# Patient Record
Sex: FEMALE | Race: WHITE | NOT HISPANIC OR LATINO | URBAN - METROPOLITAN AREA
[De-identification: names, ages, dates, MRNs, and addresses within clinical notes are randomized per-mention and may not be internally consistent; named-entity substitution may affect disease eponyms.]

---

## 2017-03-30 ENCOUNTER — OUTPATIENT (OUTPATIENT)
Dept: OUTPATIENT SERVICES | Facility: HOSPITAL | Age: 57
LOS: 1 days | Discharge: HOME | End: 2017-03-30
Payer: COMMERCIAL

## 2017-03-30 VITALS
RESPIRATION RATE: 18 BRPM | WEIGHT: 248.9 LBS | HEART RATE: 65 BPM | SYSTOLIC BLOOD PRESSURE: 144 MMHG | TEMPERATURE: 98 F | HEIGHT: 62.5 IN | DIASTOLIC BLOOD PRESSURE: 85 MMHG | OXYGEN SATURATION: 100 %

## 2017-03-30 DIAGNOSIS — Z90.49 ACQUIRED ABSENCE OF OTHER SPECIFIED PARTS OF DIGESTIVE TRACT: Chronic | ICD-10-CM

## 2017-03-30 DIAGNOSIS — Z98.890 OTHER SPECIFIED POSTPROCEDURAL STATES: Chronic | ICD-10-CM

## 2017-03-30 DIAGNOSIS — K43.2 INCISIONAL HERNIA WITHOUT OBSTRUCTION OR GANGRENE: ICD-10-CM

## 2017-03-30 LAB
ABO RH CONFIRMATION: SIGNIFICANT CHANGE UP
ANION GAP SERPL CALC-SCNC: 5 MMOL/L — SIGNIFICANT CHANGE UP (ref 5–17)
APPEARANCE UR: CLEAR — SIGNIFICANT CHANGE UP
APTT BLD: 31.3 SEC — SIGNIFICANT CHANGE UP (ref 27.5–37.4)
BASOPHILS # BLD AUTO: 0.1 K/UL — SIGNIFICANT CHANGE UP (ref 0–0.2)
BASOPHILS NFR BLD AUTO: 0.7 % — SIGNIFICANT CHANGE UP (ref 0–2)
BILIRUB UR-MCNC: NEGATIVE — SIGNIFICANT CHANGE UP
BLD GP AB SCN SERPL QL: SIGNIFICANT CHANGE UP
BUN SERPL-MCNC: 15 MG/DL — SIGNIFICANT CHANGE UP (ref 7–23)
CALCIUM SERPL-MCNC: 8.8 MG/DL — SIGNIFICANT CHANGE UP (ref 8.5–10.1)
CHLORIDE SERPL-SCNC: 111 MMOL/L — HIGH (ref 96–108)
CO2 SERPL-SCNC: 27 MMOL/L — SIGNIFICANT CHANGE UP (ref 22–31)
COLOR SPEC: YELLOW — SIGNIFICANT CHANGE UP
CREAT SERPL-MCNC: 0.92 MG/DL — SIGNIFICANT CHANGE UP (ref 0.5–1.3)
DIFF PNL FLD: NEGATIVE — SIGNIFICANT CHANGE UP
EOSINOPHIL # BLD AUTO: 0.1 K/UL — SIGNIFICANT CHANGE UP (ref 0–0.5)
EOSINOPHIL NFR BLD AUTO: 1.6 % — SIGNIFICANT CHANGE UP (ref 0–6)
GLUCOSE SERPL-MCNC: 84 MG/DL — SIGNIFICANT CHANGE UP (ref 70–99)
GLUCOSE UR QL: NEGATIVE MG/DL — SIGNIFICANT CHANGE UP
HCT VFR BLD CALC: 45.2 % — HIGH (ref 34.5–45)
HGB BLD-MCNC: 15.1 G/DL — SIGNIFICANT CHANGE UP (ref 11.5–15.5)
INR BLD: 0.99 RATIO — SIGNIFICANT CHANGE UP (ref 0.88–1.16)
KETONES UR-MCNC: NEGATIVE — SIGNIFICANT CHANGE UP
LEUKOCYTE ESTERASE UR-ACNC: NEGATIVE — SIGNIFICANT CHANGE UP
LYMPHOCYTES # BLD AUTO: 2.5 K/UL — SIGNIFICANT CHANGE UP (ref 1–3.3)
LYMPHOCYTES # BLD AUTO: 28.5 % — SIGNIFICANT CHANGE UP (ref 13–44)
MCHC RBC-ENTMCNC: 30.2 PG — SIGNIFICANT CHANGE UP (ref 27–34)
MCHC RBC-ENTMCNC: 33.4 GM/DL — SIGNIFICANT CHANGE UP (ref 32–36)
MCV RBC AUTO: 90.2 FL — SIGNIFICANT CHANGE UP (ref 80–100)
MONOCYTES # BLD AUTO: 0.7 K/UL — SIGNIFICANT CHANGE UP (ref 0–0.9)
MONOCYTES NFR BLD AUTO: 7.8 % — SIGNIFICANT CHANGE UP (ref 2–14)
NEUTROPHILS # BLD AUTO: 5.5 K/UL — SIGNIFICANT CHANGE UP (ref 1.8–7.4)
NEUTROPHILS NFR BLD AUTO: 61.4 % — SIGNIFICANT CHANGE UP (ref 43–77)
NITRITE UR-MCNC: NEGATIVE — SIGNIFICANT CHANGE UP
PH UR: 6 — SIGNIFICANT CHANGE UP (ref 4.8–8)
PLATELET # BLD AUTO: 295 K/UL — SIGNIFICANT CHANGE UP (ref 150–400)
POTASSIUM SERPL-MCNC: 4.2 MMOL/L — SIGNIFICANT CHANGE UP (ref 3.5–5.3)
POTASSIUM SERPL-SCNC: 4.2 MMOL/L — SIGNIFICANT CHANGE UP (ref 3.5–5.3)
PROT UR-MCNC: NEGATIVE MG/DL — SIGNIFICANT CHANGE UP
PROTHROM AB SERPL-ACNC: 10.7 SEC — SIGNIFICANT CHANGE UP (ref 9.8–12.7)
RBC # BLD: 5.02 M/UL — SIGNIFICANT CHANGE UP (ref 3.8–5.2)
RBC # FLD: 13 % — SIGNIFICANT CHANGE UP (ref 10.3–14.5)
SODIUM SERPL-SCNC: 143 MMOL/L — SIGNIFICANT CHANGE UP (ref 135–145)
SP GR SPEC: 1.01 — SIGNIFICANT CHANGE UP (ref 1.01–1.02)
TYPE + AB SCN PNL BLD: SIGNIFICANT CHANGE UP
UROBILINOGEN FLD QL: NEGATIVE MG/DL — SIGNIFICANT CHANGE UP
WBC # BLD: 8.9 K/UL — SIGNIFICANT CHANGE UP (ref 3.8–10.5)
WBC # FLD AUTO: 8.9 K/UL — SIGNIFICANT CHANGE UP (ref 3.8–10.5)

## 2017-03-30 PROCEDURE — 71020: CPT | Mod: 26

## 2017-03-30 PROCEDURE — 93010 ELECTROCARDIOGRAM REPORT: CPT

## 2017-03-30 NOTE — H&P PST ADULT - PSH
H/O gastric bypass  2004  S/P appendectomy  1971  S/P bunionectomy  Right x 1 - 1999. left x 2- 2001, 2014

## 2017-03-30 NOTE — H&P PST ADULT - HISTORY OF PRESENT ILLNESS
57 years old female with epigastric pain and "lump" 4/20/2017. Patient was taken to ER by ambulance. "Lump went away" after pain medications. Patient was discharged. Went to  GI doctor. Ct. Scan of abdomen ordered and indicated incisional hernia. Referred to Dr. Garza. Planned hernia repair. Denies pain , nausea or vomiting at present.

## 2017-03-30 NOTE — H&P PST ADULT - PMH
Anemia, unspecified type    Anxiety and depression    History of gastric ulcer    History of melanoma  removed from back.  Incisional hernia, without obstruction or gangrene    Obesity, unspecified obesity severity, unspecified obesity type    Sleep apnea, obstructive    Uncomplicated asthma, unspecified asthma severity

## 2017-03-30 NOTE — H&P PST ADULT - ASSESSMENT
57 years old female present to PST prior to  laparoscopic incarcerated incisional hernia repair, Upper GI Endoscopy with Dr. Romero Garza.  Plan   1. NPO after midnight  2. Take the following medications with sips of water on the day of procedure: Montelukast, Bupropion, Pristiq. Use Breo.  3. Use E-Z sponge as directed  4. Drink a quart of extra  fluids the day before your surgery.  7. CBC, BMP, CMP, PT /PTT /INR, Urinalysis, Type and Screen sent to lab  8. EKG and CXR done

## 2017-03-30 NOTE — H&P PST ADULT - FAMILY HISTORY
Father  Still living? Yes, Estimated age: 81-90  Family history of heart disease, Age at diagnosis: Age Unknown  Family history of COPD (chronic obstructive pulmonary disease), Age at diagnosis: Age Unknown  Family history of diabetes mellitus, Age at diagnosis: Age Unknown  Family history of Alzheimer's disease, Age at diagnosis: Age Unknown

## 2017-04-04 RX ORDER — SODIUM CHLORIDE 9 MG/ML
1000 INJECTION INTRAMUSCULAR; INTRAVENOUS; SUBCUTANEOUS
Qty: 0 | Refills: 0 | Status: DISCONTINUED | OUTPATIENT
Start: 2017-04-05 | End: 2017-04-05

## 2017-04-04 RX ORDER — ONDANSETRON 8 MG/1
4 TABLET, FILM COATED ORAL ONCE
Qty: 0 | Refills: 0 | Status: COMPLETED | OUTPATIENT
Start: 2017-04-05 | End: 2017-04-05

## 2017-04-05 ENCOUNTER — OUTPATIENT (OUTPATIENT)
Dept: OUTPATIENT SERVICES | Facility: HOSPITAL | Age: 57
LOS: 1 days | Discharge: HOME | End: 2017-04-05

## 2017-04-05 VITALS
DIASTOLIC BLOOD PRESSURE: 88 MMHG | WEIGHT: 248.9 LBS | SYSTOLIC BLOOD PRESSURE: 158 MMHG | HEIGHT: 62.5 IN | OXYGEN SATURATION: 98 % | TEMPERATURE: 98 F | RESPIRATION RATE: 14 BRPM | HEART RATE: 69 BPM

## 2017-04-05 VITALS
RESPIRATION RATE: 16 BRPM | TEMPERATURE: 98 F | OXYGEN SATURATION: 97 % | SYSTOLIC BLOOD PRESSURE: 149 MMHG | HEART RATE: 74 BPM | DIASTOLIC BLOOD PRESSURE: 70 MMHG

## 2017-04-05 DIAGNOSIS — Z98.890 OTHER SPECIFIED POSTPROCEDURAL STATES: Chronic | ICD-10-CM

## 2017-04-05 DIAGNOSIS — Z87.11 PERSONAL HISTORY OF PEPTIC ULCER DISEASE: ICD-10-CM

## 2017-04-05 DIAGNOSIS — G47.33 OBSTRUCTIVE SLEEP APNEA (ADULT) (PEDIATRIC): ICD-10-CM

## 2017-04-05 DIAGNOSIS — Z98.84 BARIATRIC SURGERY STATUS: ICD-10-CM

## 2017-04-05 DIAGNOSIS — K43.0 INCISIONAL HERNIA WITH OBSTRUCTION, WITHOUT GANGRENE: ICD-10-CM

## 2017-04-05 DIAGNOSIS — E66.9 OBESITY, UNSPECIFIED: ICD-10-CM

## 2017-04-05 DIAGNOSIS — Z90.49 ACQUIRED ABSENCE OF OTHER SPECIFIED PARTS OF DIGESTIVE TRACT: Chronic | ICD-10-CM

## 2017-04-05 DIAGNOSIS — F41.9 ANXIETY DISORDER, UNSPECIFIED: ICD-10-CM

## 2017-04-05 DIAGNOSIS — R13.10 DYSPHAGIA, UNSPECIFIED: ICD-10-CM

## 2017-04-05 DIAGNOSIS — K21.9 GASTRO-ESOPHAGEAL REFLUX DISEASE WITHOUT ESOPHAGITIS: ICD-10-CM

## 2017-04-05 DIAGNOSIS — J45.909 UNSPECIFIED ASTHMA, UNCOMPLICATED: ICD-10-CM

## 2017-04-05 LAB — HCG UR QL: NEGATIVE — SIGNIFICANT CHANGE UP

## 2017-04-05 RX ORDER — FENTANYL CITRATE 50 UG/ML
50 INJECTION INTRAVENOUS
Qty: 0 | Refills: 0 | Status: DISCONTINUED | OUTPATIENT
Start: 2017-04-05 | End: 2017-04-05

## 2017-04-05 RX ORDER — ACETAMINOPHEN 500 MG
1000 TABLET ORAL ONCE
Qty: 0 | Refills: 0 | Status: COMPLETED | OUTPATIENT
Start: 2017-04-05 | End: 2017-04-05

## 2017-04-05 RX ORDER — TIOTROPIUM BROMIDE 18 UG/1
1 CAPSULE ORAL; RESPIRATORY (INHALATION) DAILY
Qty: 0 | Refills: 0 | Status: ACTIVE | OUTPATIENT
Start: 2017-04-05 | End: 2018-03-04

## 2017-04-05 RX ORDER — MORPHINE SULFATE 50 MG/1
4 CAPSULE, EXTENDED RELEASE ORAL EVERY 4 HOURS
Qty: 0 | Refills: 0 | Status: DISCONTINUED | OUTPATIENT
Start: 2017-04-05 | End: 2017-04-05

## 2017-04-05 RX ORDER — HEPARIN SODIUM 5000 [USP'U]/ML
5000 INJECTION INTRAVENOUS; SUBCUTANEOUS ONCE
Qty: 0 | Refills: 0 | Status: COMPLETED | OUTPATIENT
Start: 2017-04-05 | End: 2017-04-05

## 2017-04-05 RX ORDER — SODIUM CHLORIDE 9 MG/ML
1000 INJECTION INTRAMUSCULAR; INTRAVENOUS; SUBCUTANEOUS
Qty: 0 | Refills: 0 | Status: DISCONTINUED | OUTPATIENT
Start: 2017-04-05 | End: 2017-04-05

## 2017-04-05 RX ORDER — ONDANSETRON 8 MG/1
4 TABLET, FILM COATED ORAL ONCE
Qty: 0 | Refills: 0 | Status: DISCONTINUED | OUTPATIENT
Start: 2017-04-05 | End: 2017-04-05

## 2017-04-05 RX ORDER — ALBUTEROL 90 UG/1
1 AEROSOL, METERED ORAL EVERY 4 HOURS
Qty: 0 | Refills: 0 | Status: ACTIVE | OUTPATIENT
Start: 2017-04-05 | End: 2018-03-04

## 2017-04-05 RX ORDER — IPRATROPIUM/ALBUTEROL SULFATE 18-103MCG
3 AEROSOL WITH ADAPTER (GRAM) INHALATION EVERY 6 HOURS
Qty: 0 | Refills: 0 | Status: COMPLETED | OUTPATIENT
Start: 2017-04-05 | End: 2017-04-05

## 2017-04-05 RX ORDER — OXYCODONE HYDROCHLORIDE 5 MG/1
5 TABLET ORAL ONCE
Qty: 0 | Refills: 0 | Status: DISCONTINUED | OUTPATIENT
Start: 2017-04-05 | End: 2017-04-05

## 2017-04-05 RX ADMIN — ONDANSETRON 4 MILLIGRAM(S): 8 TABLET, FILM COATED ORAL at 07:11

## 2017-04-05 RX ADMIN — HEPARIN SODIUM 5000 UNIT(S): 5000 INJECTION INTRAVENOUS; SUBCUTANEOUS at 07:11

## 2017-04-05 RX ADMIN — Medication 3 MILLILITER(S): at 12:55

## 2017-04-05 RX ADMIN — FENTANYL CITRATE 50 MICROGRAM(S): 50 INJECTION INTRAVENOUS at 11:05

## 2017-04-05 RX ADMIN — Medication 400 MILLIGRAM(S): at 11:08

## 2017-04-05 RX ADMIN — OXYCODONE HYDROCHLORIDE 5 MILLIGRAM(S): 5 TABLET ORAL at 11:08

## 2017-04-05 RX ADMIN — SODIUM CHLORIDE 75 MILLILITER(S): 9 INJECTION INTRAMUSCULAR; INTRAVENOUS; SUBCUTANEOUS at 10:46

## 2017-04-05 NOTE — DISCHARGE NOTE ADULT - CARE PLAN
Goal:	pain control  Instructions for follow-up, activity and diet:	no heavy lifting for 4-6 weeks, advance diet as tolerated, walk frequently, ok to shower, no driving 5 days, no creams or ointments on incisions. Principal Discharge DX:	Incisional hernia, without obstruction or gangrene  Goal:	pain control  Instructions for follow-up, activity and diet:	no heavy lifting for 4-6 weeks, advance diet as tolerated, walk frequently, ok to shower, no driving 5 days, no creams or ointments on incisions.

## 2017-04-05 NOTE — DISCHARGE NOTE ADULT - NS AS ACTIVITY OBS
No Heavy lifting/straining/Do not drive or operate machinery/Walking-Indoors allowed/Stairs allowed/Do not make important decisions/Walking-Outdoors allowed/Showering allowed

## 2017-04-05 NOTE — DISCHARGE NOTE ADULT - PLAN OF CARE
pain control no heavy lifting for 4-6 weeks, advance diet as tolerated, walk frequently, ok to shower, no driving 5 days, no creams or ointments on incisions.

## 2017-04-05 NOTE — DISCHARGE NOTE ADULT - PATIENT PORTAL LINK FT
“You can access the [facility] Patient Portal, offered by , by registering with the following website: [email or web address]”

## 2017-04-05 NOTE — DISCHARGE NOTE ADULT - HOSPITAL COURSE
pt admitted through [facility] for outpatient procedure- pt has a CT scan noted for incisional hernia, pt with past gayathri en y gastric bypass. Pt scheduled for elective repair of incisional hernia. oNce stable, pt ok for discharge home today, follow up in office in 2 weeks.

## 2017-04-05 NOTE — BRIEF OPERATIVE NOTE - PRE-OP DX
Incisional hernia of anterior abdominal wall without obstruction or gangrene  04/05/2017    Active  Mine Kim

## 2017-04-05 NOTE — DISCHARGE NOTE ADULT - MEDICATION SUMMARY - MEDICATIONS TO TAKE
I will START or STAY ON the medications listed below when I get home from the hospital:    Pristiq 100 mg oral tablet, extended release  -- 1 tab(s) by mouth once a day (in the morning)  -- Indication: For home medication    Breo Ellipta 200 mcg-25 mcg/inh inhalation powder  -- 1 puff(s) inhaled once a day (in the morning)  -- Indication: For home medication    ferrous sulfate 325 mg (65 mg elemental iron) oral delayed release tablet  -- 1 tab(s) by mouth once a day (at bedtime)  -- Indication: For home medication    montelukast 10 mg oral tablet  -- 1 tab(s) by mouth once a day (in the morning)  -- Indication: For home medication    omeprazole 40 mg oral delayed release capsule  -- 1 cap(s) by mouth once a day (in the morning)  -- Indication: For home medication    buPROPion 24 hour extended release  -- 150 milligram(s) by mouth once a day (in the morning)  -- Indication: For home medication    multivitamin  -- 1 tab(s) by mouth once a day  -- Indication: For home medication    Calcium 600+D oral tablet  -- 1 tab(s) by mouth once a day (in the morning)  -- Indication: For home medication    Vitamin D3 1000 intl units oral capsule  -- 1 cap(s) by mouth once a day (in the morning)  -- Indication: For home medication    Vitamin C 500 mg oral capsule  -- 1 cap(s) by mouth once a day (at bedtime)  -- Indication: For home medication

## 2017-04-05 NOTE — ASU DISCHARGE PLAN (ADULT/PEDIATRIC). - NOTIFY
Persistent Nausea and Vomiting/Swelling that continues/Bleeding that does not stop/Pain not relieved by Medications/Unable to Urinate

## 2017-08-09 ENCOUNTER — OTHER (OUTPATIENT)
Age: 57
End: 2017-08-09

## 2017-08-30 ENCOUNTER — APPOINTMENT (OUTPATIENT)
Dept: OBGYN | Facility: CLINIC | Age: 57
End: 2017-08-30
Payer: COMMERCIAL

## 2017-08-30 VITALS
SYSTOLIC BLOOD PRESSURE: 130 MMHG | HEIGHT: 62 IN | DIASTOLIC BLOOD PRESSURE: 82 MMHG | WEIGHT: 250 LBS | BODY MASS INDEX: 46.01 KG/M2

## 2017-08-30 PROCEDURE — 99396 PREV VISIT EST AGE 40-64: CPT

## 2017-08-31 ENCOUNTER — TRANSCRIPTION ENCOUNTER (OUTPATIENT)
Age: 57
End: 2017-08-31

## 2017-08-31 LAB — HPV HIGH+LOW RISK DNA PNL CVX: NEGATIVE

## 2017-09-05 LAB — CYTOLOGY CVX/VAG DOC THIN PREP: NORMAL

## 2018-12-05 PROBLEM — Z87.19 PERSONAL HISTORY OF OTHER DISEASES OF THE DIGESTIVE SYSTEM: Chronic | Status: ACTIVE | Noted: 2017-03-30

## 2018-12-05 PROBLEM — F41.9 ANXIETY DISORDER, UNSPECIFIED: Chronic | Status: ACTIVE | Noted: 2017-03-30

## 2018-12-05 PROBLEM — J45.909 UNSPECIFIED ASTHMA, UNCOMPLICATED: Chronic | Status: ACTIVE | Noted: 2017-03-30

## 2018-12-05 PROBLEM — K43.2 INCISIONAL HERNIA WITHOUT OBSTRUCTION OR GANGRENE: Chronic | Status: ACTIVE | Noted: 2017-03-30

## 2018-12-05 PROBLEM — D64.9 ANEMIA, UNSPECIFIED: Chronic | Status: ACTIVE | Noted: 2017-03-30

## 2018-12-05 PROBLEM — Z85.820 PERSONAL HISTORY OF MALIGNANT MELANOMA OF SKIN: Chronic | Status: ACTIVE | Noted: 2017-03-30

## 2018-12-05 PROBLEM — E66.9 OBESITY, UNSPECIFIED: Chronic | Status: ACTIVE | Noted: 2017-03-30

## 2018-12-06 ENCOUNTER — RX RENEWAL (OUTPATIENT)
Age: 58
End: 2018-12-06

## 2018-12-06 DIAGNOSIS — N63.0 UNSPECIFIED LUMP IN UNSPECIFIED BREAST: ICD-10-CM

## 2018-12-13 ENCOUNTER — APPOINTMENT (OUTPATIENT)
Dept: OBGYN | Facility: CLINIC | Age: 58
End: 2018-12-13
Payer: COMMERCIAL

## 2018-12-13 VITALS
WEIGHT: 239 LBS | BODY MASS INDEX: 43.98 KG/M2 | DIASTOLIC BLOOD PRESSURE: 84 MMHG | HEIGHT: 62 IN | SYSTOLIC BLOOD PRESSURE: 170 MMHG

## 2018-12-13 PROCEDURE — 99396 PREV VISIT EST AGE 40-64: CPT

## 2018-12-14 LAB — HPV HIGH+LOW RISK DNA PNL CVX: NOT DETECTED

## 2018-12-16 ENCOUNTER — MESSAGE (OUTPATIENT)
Age: 58
End: 2018-12-16

## 2018-12-17 LAB — CYTOLOGY CVX/VAG DOC THIN PREP: NORMAL

## 2019-01-07 NOTE — ASU PREOP CHECKLIST - BSA (M2)
January 7, 2019         Patient: Kary Kevin   YOB: 1963   Date of Visit: 1/7/2019           To Whom it May Concern:    Kary Kevin was seen in my clinic on 1/7/2019. She should avoid working overtime for 2 weeks.    If you have any questions or concerns, please don't hesitate to call.        Sincerely,           Dustin Benitez M.D.  Electronically Signed     
2.11

## 2019-01-18 ENCOUNTER — APPOINTMENT (OUTPATIENT)
Dept: SURGICAL ONCOLOGY | Facility: CLINIC | Age: 59
End: 2019-01-18

## 2019-01-23 ENCOUNTER — APPOINTMENT (OUTPATIENT)
Dept: SURGICAL ONCOLOGY | Facility: CLINIC | Age: 59
End: 2019-01-23
Payer: COMMERCIAL

## 2019-01-23 VITALS — WEIGHT: 240 LBS | BODY MASS INDEX: 44.16 KG/M2 | OXYGEN SATURATION: 97 % | HEART RATE: 92 BPM | HEIGHT: 62 IN

## 2019-01-23 VITALS — SYSTOLIC BLOOD PRESSURE: 126 MMHG | OXYGEN SATURATION: 99 % | HEART RATE: 78 BPM | DIASTOLIC BLOOD PRESSURE: 84 MMHG

## 2019-01-23 DIAGNOSIS — Z87.19 PERSONAL HISTORY OF OTHER DISEASES OF THE DIGESTIVE SYSTEM: ICD-10-CM

## 2019-01-23 DIAGNOSIS — Z87.09 PERSONAL HISTORY OF OTHER DISEASES OF THE RESPIRATORY SYSTEM: ICD-10-CM

## 2019-01-23 DIAGNOSIS — Z85.820 PERSONAL HISTORY OF MALIGNANT MELANOMA OF SKIN: ICD-10-CM

## 2019-01-23 PROCEDURE — 99245 OFF/OP CONSLTJ NEW/EST HI 55: CPT

## 2019-01-23 NOTE — HISTORY OF PRESENT ILLNESS
[de-identified] : 59-year-old woman who in mid December underwent mammogram and sonogram and was identified to have a 7 mm hypoechoic mass in the central lateral left breast. Ultrasound-guided core biopsy was performed and an invasive mammary carcinoma with lobular features was identified.\par She stated she felt no masses or noted no discharge at the time.\par She denies any family history of breast or ovarian ca.

## 2019-01-23 NOTE — CONSULT LETTER
[Dear  ___] : Dear  [unfilled], [Consult Letter:] : I had the pleasure of evaluating your patient, [unfilled]. [Please see my note below.] : Please see my note below. [Consult Closing:] : Thank you very much for allowing me to participate in the care of this patient.  If you have any questions, please do not hesitate to contact me. [Sincerely,] : Sincerely, [FreeTextEntry2] : Kory Krishnan MD [FreeTextEntry3] : Davy Disla M.D.\par

## 2019-01-23 NOTE — PHYSICAL EXAM
[Normal] : supple, no neck mass and thyroid not enlarged [Normal Supraclavicular Lymph Nodes] : normal supraclavicular lymph nodes [Normal Axillary Lymph Nodes] : normal axillary lymph nodes [Normal] : oriented to person, place and time, with appropriate affect [FreeTextEntry1] : GM was present during the exam [de-identified] : Normal S1, S2. Regular Rate and rhythm [de-identified] : Complete bilateral breast examination performed supine and upright revealed no palpable masses, tenderness ,nipple discharge, inversion ,deviation or enlarged axillary or supraclavicular lymph node. [de-identified] : Clear breath sounds bilaterally, normal respiratory effort

## 2019-01-23 NOTE — ASSESSMENT
[FreeTextEntry1] : Impression:\par \par Newly diagnosed left breast cancer measuring approximately 7-8mm. \par ER +, WA+ Khr6psi negative.\par Recommend MR\par \par The patient and I discussed the surgical management of breast cancer. I explained that breast cancer can be treated with 2 main surgical approaches. One is breast conserving therapy. The other is mastectomy with or without immediate reconstruction by a plastic surgeon. Breast conserving therapy involves wide excision of the involved area. Negative margins must be achieved with this wide excision. In addition, evaluation of the lymph nodes either with a sentinel lymph node biopsy or an axillary lymph node dissection may be required. This treatment usually requires postoperative radiation to the breast. Treatment with mastectomy also involves evaluation of the lymph node with a sentinel lymph node biopsy or axillary lymph node dissection. Post operative radiation therapy may be needed even after mastectomy in certain advanced cases.\par \par Plan: MR prior to final decision making.\par Patient desires Breast conservation if feasible.

## 2019-01-30 ENCOUNTER — FORM ENCOUNTER (OUTPATIENT)
Age: 59
End: 2019-01-30

## 2019-01-30 ENCOUNTER — OUTPATIENT (OUTPATIENT)
Dept: OUTPATIENT SERVICES | Facility: HOSPITAL | Age: 59
LOS: 1 days | End: 2019-01-30
Payer: COMMERCIAL

## 2019-01-30 ENCOUNTER — RESULT REVIEW (OUTPATIENT)
Age: 59
End: 2019-01-30

## 2019-01-30 DIAGNOSIS — Z98.890 OTHER SPECIFIED POSTPROCEDURAL STATES: Chronic | ICD-10-CM

## 2019-01-30 DIAGNOSIS — C50.919 MALIGNANT NEOPLASM OF UNSPECIFIED SITE OF UNSPECIFIED FEMALE BREAST: ICD-10-CM

## 2019-01-30 DIAGNOSIS — Z90.49 ACQUIRED ABSENCE OF OTHER SPECIFIED PARTS OF DIGESTIVE TRACT: Chronic | ICD-10-CM

## 2019-01-30 PROCEDURE — 88321 CONSLTJ&REPRT SLD PREP ELSWR: CPT

## 2019-01-31 ENCOUNTER — APPOINTMENT (OUTPATIENT)
Dept: MRI IMAGING | Facility: CLINIC | Age: 59
End: 2019-01-31
Payer: COMMERCIAL

## 2019-01-31 ENCOUNTER — OUTPATIENT (OUTPATIENT)
Dept: OUTPATIENT SERVICES | Facility: HOSPITAL | Age: 59
LOS: 1 days | End: 2019-01-31
Payer: COMMERCIAL

## 2019-01-31 DIAGNOSIS — C50.512 MALIGNANT NEOPLASM OF LOWER-OUTER QUADRANT OF LEFT FEMALE BREAST: ICD-10-CM

## 2019-01-31 DIAGNOSIS — Z98.890 OTHER SPECIFIED POSTPROCEDURAL STATES: Chronic | ICD-10-CM

## 2019-01-31 DIAGNOSIS — Z90.49 ACQUIRED ABSENCE OF OTHER SPECIFIED PARTS OF DIGESTIVE TRACT: Chronic | ICD-10-CM

## 2019-01-31 LAB — SURGICAL PATHOLOGY STUDY: SIGNIFICANT CHANGE UP

## 2019-01-31 PROCEDURE — C8937: CPT

## 2019-01-31 PROCEDURE — A9585: CPT

## 2019-01-31 PROCEDURE — 77049 MRI BREAST C-+ W/CAD BI: CPT | Mod: 26

## 2019-01-31 PROCEDURE — C8908: CPT

## 2019-02-07 ENCOUNTER — FORM ENCOUNTER (OUTPATIENT)
Age: 59
End: 2019-02-07

## 2019-02-08 ENCOUNTER — APPOINTMENT (OUTPATIENT)
Dept: MRI IMAGING | Facility: CLINIC | Age: 59
End: 2019-02-08
Payer: COMMERCIAL

## 2019-02-08 ENCOUNTER — OUTPATIENT (OUTPATIENT)
Dept: OUTPATIENT SERVICES | Facility: HOSPITAL | Age: 59
LOS: 1 days | End: 2019-02-08
Payer: COMMERCIAL

## 2019-02-08 ENCOUNTER — RESULT REVIEW (OUTPATIENT)
Age: 59
End: 2019-02-08

## 2019-02-08 DIAGNOSIS — Z00.8 ENCOUNTER FOR OTHER GENERAL EXAMINATION: ICD-10-CM

## 2019-02-08 DIAGNOSIS — Z98.890 OTHER SPECIFIED POSTPROCEDURAL STATES: Chronic | ICD-10-CM

## 2019-02-08 DIAGNOSIS — N63.0 UNSPECIFIED LUMP IN UNSPECIFIED BREAST: ICD-10-CM

## 2019-02-08 DIAGNOSIS — Z90.49 ACQUIRED ABSENCE OF OTHER SPECIFIED PARTS OF DIGESTIVE TRACT: Chronic | ICD-10-CM

## 2019-02-08 PROCEDURE — 77065 DX MAMMO INCL CAD UNI: CPT | Mod: 26,LT

## 2019-02-08 PROCEDURE — 88305 TISSUE EXAM BY PATHOLOGIST: CPT | Mod: 26

## 2019-02-08 PROCEDURE — 19085 BX BREAST 1ST LESION MR IMAG: CPT | Mod: LT

## 2019-02-08 PROCEDURE — 77065 DX MAMMO INCL CAD UNI: CPT

## 2019-02-08 PROCEDURE — A4648: CPT

## 2019-02-08 PROCEDURE — 19086 BX BREAST ADD LESION MR IMAG: CPT | Mod: LT

## 2019-02-08 PROCEDURE — A9585: CPT

## 2019-02-08 PROCEDURE — 88305 TISSUE EXAM BY PATHOLOGIST: CPT

## 2019-02-08 PROCEDURE — 19086 BX BREAST ADD LESION MR IMAG: CPT

## 2019-02-08 PROCEDURE — 19085 BX BREAST 1ST LESION MR IMAG: CPT

## 2019-02-14 ENCOUNTER — APPOINTMENT (OUTPATIENT)
Dept: PLASTIC SURGERY | Facility: CLINIC | Age: 59
End: 2019-02-14
Payer: COMMERCIAL

## 2019-02-14 VITALS — WEIGHT: 240 LBS | BODY MASS INDEX: 44.16 KG/M2 | HEIGHT: 62 IN

## 2019-02-14 DIAGNOSIS — Z83.3 FAMILY HISTORY OF DIABETES MELLITUS: ICD-10-CM

## 2019-02-14 DIAGNOSIS — Z78.9 OTHER SPECIFIED HEALTH STATUS: ICD-10-CM

## 2019-02-14 PROCEDURE — 99205 OFFICE O/P NEW HI 60 MIN: CPT

## 2019-02-15 NOTE — REASON FOR VISIT
[Consultation] : a consultation visit [FreeTextEntry1] : Patient presents for a breast reconstruction consultation.

## 2019-02-15 NOTE — HISTORY OF PRESENT ILLNESS
[FreeTextEntry1] : 58 yo female presents for breast reconstruction consultation.  Patient states she underwent mammogram/sonogram followed by core biopsy of a left breast mass in December and January and was diagnosed with left breast cancer.  She denies previous personal or family history of breast cancer.  She has a surgical history of laparoscopic gastric bypass in 2004, followed by laparoscopic hiatal hernia repair, and appendectomy as a child.  She has no children, no pregnancies.  She has asthma for which she takes Breo and Montelukast.  She does not smoke.  She works as an  of a [employer] in [place]. She is considering her options and presents to discuss breast reconstruction following lumpectomy vs. mastectomy.

## 2019-02-15 NOTE — PHYSICAL EXAM
[Bra Size: _______] : Bra Size: [unfilled] [NI] : Normal [de-identified] : Ht: 5'2" Wt: 240 lbs BMI: 43.9 [de-identified] : sternal notch to nipple on the left is 34 cm and 35 on the right, the nipple to IMF is 16 cm on the left and 16.5 cm on the right, the base width is 14 cm, there is grade 3 ptosis.  The right breast is lower and larger. [de-identified] : Obese, laparoscopic scars, while there is infraumbilical skin and fat excess there is visceral fat present supraumbilical

## 2019-02-28 ENCOUNTER — FORM ENCOUNTER (OUTPATIENT)
Age: 59
End: 2019-02-28

## 2019-02-28 ENCOUNTER — APPOINTMENT (OUTPATIENT)
Dept: PLASTIC SURGERY | Facility: CLINIC | Age: 59
End: 2019-02-28
Payer: COMMERCIAL

## 2019-02-28 PROCEDURE — 99213 OFFICE O/P EST LOW 20 MIN: CPT

## 2019-03-01 ENCOUNTER — APPOINTMENT (OUTPATIENT)
Dept: CT IMAGING | Facility: IMAGING CENTER | Age: 59
End: 2019-03-01
Payer: COMMERCIAL

## 2019-03-01 ENCOUNTER — OUTPATIENT (OUTPATIENT)
Dept: OUTPATIENT SERVICES | Facility: HOSPITAL | Age: 59
LOS: 1 days | End: 2019-03-01
Payer: COMMERCIAL

## 2019-03-01 DIAGNOSIS — Z98.890 OTHER SPECIFIED POSTPROCEDURAL STATES: Chronic | ICD-10-CM

## 2019-03-01 DIAGNOSIS — Z90.49 ACQUIRED ABSENCE OF OTHER SPECIFIED PARTS OF DIGESTIVE TRACT: Chronic | ICD-10-CM

## 2019-03-01 DIAGNOSIS — C50.512 MALIGNANT NEOPLASM OF LOWER-OUTER QUADRANT OF LEFT FEMALE BREAST: ICD-10-CM

## 2019-03-01 PROCEDURE — 74174 CTA ABD&PLVS W/CONTRAST: CPT

## 2019-03-01 PROCEDURE — 74174 CTA ABD&PLVS W/CONTRAST: CPT | Mod: 26

## 2019-03-02 NOTE — HISTORY OF PRESENT ILLNESS
[FreeTextEntry1] : Pt here to discuss breast reconstruction options.  Pt underwent mammogram/sonogram followed by core biopsy of left breast mass in December and January and was diagnosed with left breast cancer.  Pt denies any personal or family hx of breast cancer.  Pt had a laparoscopic gastric bypass in 2004.  Pt also had a laparoscopic hiatal hernia repair and an appendectomy.  Pt has no children and no pregnancies.

## 2019-03-02 NOTE — PHYSICAL EXAM
[NI] : Normal [de-identified] : Height 5'2'' and weight is 240lbs.  BMI of 43.9 [de-identified] : Bra size is 44DD.  Large pendulous breasts [de-identified] : large abdominal pannus +laparoscopic scars

## 2019-03-02 NOTE — REVIEW OF SYSTEMS
[Fever] : no fever [Chills] : no chills [Negative] : Heme/Lymph [FreeTextEntry6] : +chronic bronchitis and asthma for which she takes Breo and Montelukast [de-identified] : breast cancer

## 2019-03-17 ENCOUNTER — FORM ENCOUNTER (OUTPATIENT)
Age: 59
End: 2019-03-17

## 2019-03-18 ENCOUNTER — OUTPATIENT (OUTPATIENT)
Dept: OUTPATIENT SERVICES | Facility: HOSPITAL | Age: 59
LOS: 1 days | End: 2019-03-18
Payer: COMMERCIAL

## 2019-03-18 VITALS
DIASTOLIC BLOOD PRESSURE: 88 MMHG | HEIGHT: 62 IN | RESPIRATION RATE: 16 BRPM | TEMPERATURE: 98 F | OXYGEN SATURATION: 96 % | WEIGHT: 248.02 LBS | SYSTOLIC BLOOD PRESSURE: 140 MMHG | HEART RATE: 88 BPM

## 2019-03-18 DIAGNOSIS — Z98.890 OTHER SPECIFIED POSTPROCEDURAL STATES: Chronic | ICD-10-CM

## 2019-03-18 DIAGNOSIS — C50.919 MALIGNANT NEOPLASM OF UNSPECIFIED SITE OF UNSPECIFIED FEMALE BREAST: ICD-10-CM

## 2019-03-18 DIAGNOSIS — C50.512 MALIGNANT NEOPLASM OF LOWER-OUTER QUADRANT OF LEFT FEMALE BREAST: ICD-10-CM

## 2019-03-18 DIAGNOSIS — M12.9 ARTHROPATHY, UNSPECIFIED: Chronic | ICD-10-CM

## 2019-03-18 DIAGNOSIS — G47.30 SLEEP APNEA, UNSPECIFIED: ICD-10-CM

## 2019-03-18 DIAGNOSIS — Z90.49 ACQUIRED ABSENCE OF OTHER SPECIFIED PARTS OF DIGESTIVE TRACT: Chronic | ICD-10-CM

## 2019-03-18 LAB
ALBUMIN SERPL ELPH-MCNC: 4.1 G/DL — SIGNIFICANT CHANGE UP (ref 3.3–5)
ALP SERPL-CCNC: 171 U/L — HIGH (ref 40–120)
ALT FLD-CCNC: 52 U/L — HIGH (ref 4–33)
ANION GAP SERPL CALC-SCNC: 14 MMO/L — SIGNIFICANT CHANGE UP (ref 7–14)
AST SERPL-CCNC: 38 U/L — HIGH (ref 4–32)
BILIRUB SERPL-MCNC: 0.3 MG/DL — SIGNIFICANT CHANGE UP (ref 0.2–1.2)
BLD GP AB SCN SERPL QL: NEGATIVE — SIGNIFICANT CHANGE UP
BUN SERPL-MCNC: 11 MG/DL — SIGNIFICANT CHANGE UP (ref 7–23)
CALCIUM SERPL-MCNC: 9.4 MG/DL — SIGNIFICANT CHANGE UP (ref 8.4–10.5)
CHLORIDE SERPL-SCNC: 108 MMOL/L — HIGH (ref 98–107)
CO2 SERPL-SCNC: 21 MMOL/L — LOW (ref 22–31)
CREAT SERPL-MCNC: 0.92 MG/DL — SIGNIFICANT CHANGE UP (ref 0.5–1.3)
GLUCOSE SERPL-MCNC: 81 MG/DL — SIGNIFICANT CHANGE UP (ref 70–99)
HCT VFR BLD CALC: 44.9 % — SIGNIFICANT CHANGE UP (ref 34.5–45)
HGB BLD-MCNC: 14 G/DL — SIGNIFICANT CHANGE UP (ref 11.5–15.5)
MCHC RBC-ENTMCNC: 29.1 PG — SIGNIFICANT CHANGE UP (ref 27–34)
MCHC RBC-ENTMCNC: 31.2 % — LOW (ref 32–36)
MCV RBC AUTO: 93.3 FL — SIGNIFICANT CHANGE UP (ref 80–100)
NRBC # FLD: 0 K/UL — LOW (ref 25–125)
PLATELET # BLD AUTO: 291 K/UL — SIGNIFICANT CHANGE UP (ref 150–400)
PMV BLD: 9.9 FL — SIGNIFICANT CHANGE UP (ref 7–13)
POTASSIUM SERPL-MCNC: 3.9 MMOL/L — SIGNIFICANT CHANGE UP (ref 3.5–5.3)
POTASSIUM SERPL-SCNC: 3.9 MMOL/L — SIGNIFICANT CHANGE UP (ref 3.5–5.3)
PROT SERPL-MCNC: 7.3 G/DL — SIGNIFICANT CHANGE UP (ref 6–8.3)
RBC # BLD: 4.81 M/UL — SIGNIFICANT CHANGE UP (ref 3.8–5.2)
RBC # FLD: 13.9 % — SIGNIFICANT CHANGE UP (ref 10.3–14.5)
RH IG SCN BLD-IMP: POSITIVE — SIGNIFICANT CHANGE UP
SODIUM SERPL-SCNC: 143 MMOL/L — SIGNIFICANT CHANGE UP (ref 135–145)
WBC # BLD: 6.64 K/UL — SIGNIFICANT CHANGE UP (ref 3.8–10.5)
WBC # FLD AUTO: 6.64 K/UL — SIGNIFICANT CHANGE UP (ref 3.8–10.5)

## 2019-03-18 PROCEDURE — 93010 ELECTROCARDIOGRAM REPORT: CPT

## 2019-03-18 PROCEDURE — 71045 X-RAY EXAM CHEST 1 VIEW: CPT | Mod: 26

## 2019-03-18 RX ORDER — CHOLECALCIFEROL (VITAMIN D3) 125 MCG
1 CAPSULE ORAL
Qty: 0 | Refills: 0 | COMMUNITY

## 2019-03-18 RX ORDER — FERROUS SULFATE 325(65) MG
1 TABLET ORAL
Qty: 0 | Refills: 0 | COMMUNITY

## 2019-03-18 RX ORDER — ASCORBIC ACID 60 MG
1 TABLET,CHEWABLE ORAL
Qty: 0 | Refills: 0 | COMMUNITY

## 2019-03-18 RX ORDER — SODIUM CHLORIDE 9 MG/ML
1000 INJECTION, SOLUTION INTRAVENOUS
Qty: 0 | Refills: 0 | Status: DISCONTINUED | OUTPATIENT
Start: 2019-03-27 | End: 2019-03-28

## 2019-03-18 RX ORDER — OMEPRAZOLE 10 MG/1
1 CAPSULE, DELAYED RELEASE ORAL
Qty: 0 | Refills: 0 | COMMUNITY

## 2019-03-18 NOTE — H&P PST ADULT - HISTORY OF PRESENT ILLNESS
This is a  60 y/o female who presents with recent abnormal mammography with subsequent sonogram, biopsy and MRI confirming left breast cancer. Intervention warranted. Scheduled for b/l mastectomy b/l sentinel lymph node biopsy possible axillary lymph node dissection b/l breast reconstruction with deep inferior epigastric artery  flaps on 3-27-19

## 2019-03-18 NOTE — H&P PST ADULT - NSICDXFAMILYHX_GEN_ALL_CORE_FT
FAMILY HISTORY:  Father  Still living? Yes, Estimated age: 81-90  Family history of Alzheimer's disease, Age at diagnosis: Age Unknown  Family history of COPD (chronic obstructive pulmonary disease), Age at diagnosis: Age Unknown  Family history of diabetes mellitus, Age at diagnosis: Age Unknown  Family history of heart disease, Age at diagnosis: Age Unknown

## 2019-03-18 NOTE — H&P PST ADULT - NSICDXPROBLEM_GEN_ALL_CORE_FT
PROBLEM DIAGNOSES  Problem: Breast cancer  Assessment and Plan: This is a 60 y/o female who is  Scheduled for b/l mastectomy b/l sentinel lymph node biopsy possible axillary lymph node dissection b/l breast reconstruction with deep inferior epigastric artery  flaps on 3-27-19  * Given pre op instructions  * CXR done as per prescription from surgeon -- see in chart  * Await prearranged medical evaluation with pcp  * Instructed to take normal am dose of breo Ellipta, bupropioon, Pristiq montelukast the am of surgery      Problem: Sleep apnea  Assessment and Plan: Notified OR booking via fax of ELI precautions  * Await sleep study from Dr. Roca PROBLEM DIAGNOSES  Problem: Breast cancer  Assessment and Plan: This is a 60 y/o female who is  Scheduled for b/l mastectomy b/l sentinel lymph node biopsy possible axillary lymph node dissection b/l breast reconstruction with deep inferior epigastric artery  flaps on 3-27-19  * Given pre op instructions  * CXR done as per prescription from surgeon -- see in chart  * Await prearranged medical evaluation with pcp  * Instructed to take normal am dose of Breo Ellipta, bupropion, Pristiq montelukast the am of surgery      Problem: Sleep apnea  Assessment and Plan: Notified OR booking via fax of ELI precautions  * Await sleep study from Dr. Roca

## 2019-03-18 NOTE — H&P PST ADULT - NSICDXPASTMEDICALHX_GEN_ALL_CORE_FT
PAST MEDICAL HISTORY:  Anemia, unspecified type     Anxiety and depression     Breast cancer left side in 2019    History of gastric ulcer     History of melanoma removed from back.    History of shingles October 2018    Incisional hernia, without obstruction or gangrene     Obesity, unspecified obesity severity, unspecified obesity type     Sleep apnea, obstructive uses device at night    Uncomplicated asthma, unspecified asthma severity

## 2019-03-18 NOTE — H&P PST ADULT - NSICDXPASTSURGICALHX_GEN_ALL_CORE_FT
PAST SURGICAL HISTORY:  Arthropathy right knee meniscus surgery    H/O gastric bypass 2004    S/P appendectomy 1971    S/P bunionectomy Right x 1 - 1999. left x 2 with hardware- 2001, 2014

## 2019-03-19 PROBLEM — G47.33 OBSTRUCTIVE SLEEP APNEA (ADULT) (PEDIATRIC): Chronic | Status: ACTIVE | Noted: 2017-03-30

## 2019-03-22 ENCOUNTER — RX RENEWAL (OUTPATIENT)
Age: 59
End: 2019-03-22

## 2019-03-26 ENCOUNTER — TRANSCRIPTION ENCOUNTER (OUTPATIENT)
Age: 59
End: 2019-03-26

## 2019-03-27 ENCOUNTER — INPATIENT (INPATIENT)
Facility: HOSPITAL | Age: 59
LOS: 2 days | Discharge: HOME | End: 2019-03-30
Attending: PLASTIC SURGERY | Admitting: PLASTIC SURGERY
Payer: COMMERCIAL

## 2019-03-27 ENCOUNTER — APPOINTMENT (OUTPATIENT)
Dept: SURGICAL ONCOLOGY | Facility: HOSPITAL | Age: 59
End: 2019-03-27

## 2019-03-27 ENCOUNTER — TRANSCRIPTION ENCOUNTER (OUTPATIENT)
Age: 59
End: 2019-03-27

## 2019-03-27 ENCOUNTER — RESULT REVIEW (OUTPATIENT)
Age: 59
End: 2019-03-27

## 2019-03-27 VITALS
SYSTOLIC BLOOD PRESSURE: 140 MMHG | HEART RATE: 75 BPM | TEMPERATURE: 98 F | RESPIRATION RATE: 16 BRPM | HEIGHT: 62 IN | OXYGEN SATURATION: 97 % | DIASTOLIC BLOOD PRESSURE: 76 MMHG | WEIGHT: 248.02 LBS

## 2019-03-27 DIAGNOSIS — M12.9 ARTHROPATHY, UNSPECIFIED: Chronic | ICD-10-CM

## 2019-03-27 DIAGNOSIS — Z90.49 ACQUIRED ABSENCE OF OTHER SPECIFIED PARTS OF DIGESTIVE TRACT: Chronic | ICD-10-CM

## 2019-03-27 DIAGNOSIS — Z98.890 OTHER SPECIFIED POSTPROCEDURAL STATES: Chronic | ICD-10-CM

## 2019-03-27 DIAGNOSIS — C50.512 MALIGNANT NEOPLASM OF LOWER-OUTER QUADRANT OF LEFT FEMALE BREAST: ICD-10-CM

## 2019-03-27 PROBLEM — Z86.19 PERSONAL HISTORY OF OTHER INFECTIOUS AND PARASITIC DISEASES: Chronic | Status: ACTIVE | Noted: 2019-03-18

## 2019-03-27 PROBLEM — C50.919 MALIGNANT NEOPLASM OF UNSPECIFIED SITE OF UNSPECIFIED FEMALE BREAST: Chronic | Status: ACTIVE | Noted: 2019-03-18

## 2019-03-27 LAB — RH IG SCN BLD-IMP: POSITIVE — SIGNIFICANT CHANGE UP

## 2019-03-27 PROCEDURE — 19303 MAST SIMPLE COMPLETE: CPT | Mod: RT

## 2019-03-27 PROCEDURE — 88331 PATH CONSLTJ SURG 1 BLK 1SPC: CPT | Mod: 26

## 2019-03-27 PROCEDURE — 88332 PATH CONSLTJ SURG EA ADD BLK: CPT | Mod: 26

## 2019-03-27 PROCEDURE — 88307 TISSUE EXAM BY PATHOLOGIST: CPT | Mod: 26

## 2019-03-27 PROCEDURE — 38740 REMOVE ARMPIT LYMPH NODES: CPT | Mod: 62,59,LT

## 2019-03-27 PROCEDURE — 19303 MAST SIMPLE COMPLETE: CPT | Mod: LT

## 2019-03-27 PROCEDURE — 38740 REMOVE ARMPIT LYMPH NODES: CPT | Mod: 50,59,62

## 2019-03-27 PROCEDURE — 38530 BIOPSY/REMOVAL LYMPH NODES: CPT | Mod: LT

## 2019-03-27 PROCEDURE — S2068: CPT | Mod: LT,62

## 2019-03-27 PROCEDURE — 88305 TISSUE EXAM BY PATHOLOGIST: CPT | Mod: 26

## 2019-03-27 PROCEDURE — 38790 INJECT FOR LYMPHATIC X-RAY: CPT | Mod: 50

## 2019-03-27 PROCEDURE — 88342 IMHCHEM/IMCYTCHM 1ST ANTB: CPT | Mod: 26

## 2019-03-27 PROCEDURE — 38530 BIOPSY/REMOVAL LYMPH NODES: CPT | Mod: RT

## 2019-03-27 PROCEDURE — 15877 SUCTION LIPECTOMY TRUNK: CPT

## 2019-03-27 RX ORDER — HEPARIN SODIUM 5000 [USP'U]/ML
5000 INJECTION INTRAVENOUS; SUBCUTANEOUS EVERY 12 HOURS
Qty: 0 | Refills: 0 | Status: DISCONTINUED | OUTPATIENT
Start: 2019-03-27 | End: 2019-03-30

## 2019-03-27 RX ORDER — BUPROPION HYDROCHLORIDE 150 MG/1
150 TABLET, EXTENDED RELEASE ORAL DAILY
Qty: 0 | Refills: 0 | Status: DISCONTINUED | OUTPATIENT
Start: 2019-03-27 | End: 2019-03-30

## 2019-03-27 RX ORDER — ONDANSETRON 8 MG/1
4 TABLET, FILM COATED ORAL ONCE
Qty: 0 | Refills: 0 | Status: DISCONTINUED | OUTPATIENT
Start: 2019-03-27 | End: 2019-03-30

## 2019-03-27 RX ORDER — ONDANSETRON 8 MG/1
4 TABLET, FILM COATED ORAL EVERY 6 HOURS
Qty: 0 | Refills: 0 | Status: DISCONTINUED | OUTPATIENT
Start: 2019-03-27 | End: 2019-03-30

## 2019-03-27 RX ORDER — HYDROMORPHONE HYDROCHLORIDE 2 MG/ML
0.5 INJECTION INTRAMUSCULAR; INTRAVENOUS; SUBCUTANEOUS
Qty: 0 | Refills: 0 | Status: DISCONTINUED | OUTPATIENT
Start: 2019-03-27 | End: 2019-03-30

## 2019-03-27 RX ORDER — KETOROLAC TROMETHAMINE 30 MG/ML
15 SYRINGE (ML) INJECTION EVERY 6 HOURS
Qty: 0 | Refills: 0 | Status: DISCONTINUED | OUTPATIENT
Start: 2019-03-27 | End: 2019-03-30

## 2019-03-27 RX ORDER — ACETAMINOPHEN 500 MG
1000 TABLET ORAL ONCE
Qty: 0 | Refills: 0 | Status: COMPLETED | OUTPATIENT
Start: 2019-03-27 | End: 2019-03-27

## 2019-03-27 RX ORDER — CEFAZOLIN SODIUM 1 G
2000 VIAL (EA) INJECTION EVERY 8 HOURS
Qty: 0 | Refills: 0 | Status: COMPLETED | OUTPATIENT
Start: 2019-03-27 | End: 2019-03-28

## 2019-03-27 RX ORDER — OXYCODONE HYDROCHLORIDE 5 MG/1
5 TABLET ORAL EVERY 4 HOURS
Qty: 0 | Refills: 0 | Status: DISCONTINUED | OUTPATIENT
Start: 2019-03-27 | End: 2019-03-30

## 2019-03-27 RX ORDER — SODIUM CHLORIDE 9 MG/ML
1000 INJECTION, SOLUTION INTRAVENOUS
Qty: 0 | Refills: 0 | Status: DISCONTINUED | OUTPATIENT
Start: 2019-03-27 | End: 2019-03-29

## 2019-03-27 RX ORDER — MONTELUKAST 4 MG/1
10 TABLET, CHEWABLE ORAL DAILY
Qty: 0 | Refills: 0 | Status: DISCONTINUED | OUTPATIENT
Start: 2019-03-28 | End: 2019-03-30

## 2019-03-27 RX ORDER — ACETAMINOPHEN 500 MG
1000 TABLET ORAL ONCE
Qty: 0 | Refills: 0 | Status: COMPLETED | OUTPATIENT
Start: 2019-03-28 | End: 2019-03-28

## 2019-03-27 RX ADMIN — Medication 100 MILLIGRAM(S): at 22:11

## 2019-03-27 RX ADMIN — Medication 1000 MILLIGRAM(S): at 23:00

## 2019-03-27 RX ADMIN — Medication 15 MILLIGRAM(S): at 19:54

## 2019-03-27 RX ADMIN — Medication 400 MILLIGRAM(S): at 22:30

## 2019-03-27 RX ADMIN — SODIUM CHLORIDE 125 MILLILITER(S): 9 INJECTION, SOLUTION INTRAVENOUS at 20:00

## 2019-03-27 NOTE — BRIEF OPERATIVE NOTE - NSICDXBRIEFPOSTOP_GEN_ALL_CORE_FT
POST-OP DIAGNOSIS:  Invasive lobular carcinoma of left breast in female 27-Mar-2019 10:49:25  Owen Henry

## 2019-03-27 NOTE — DISCHARGE NOTE NURSING/CASE MANAGEMENT/SOCIAL WORK - NSDCDPATPORTLINK_GEN_ALL_CORE
You can access the [facility] Patient Portal, offered by [facility], by registering with the following website: [email or web address]

## 2019-03-27 NOTE — PROGRESS NOTE ADULT - SUBJECTIVE AND OBJECTIVE BOX
Pt. fully recovered from Anesthesia.  No anesthesia complications. AOX3, VSS.  Transfer care to surgical service.  Pt. to remain in PACU overnight for flap checks on surgical service.

## 2019-03-27 NOTE — BRIEF OPERATIVE NOTE - NSICDXBRIEFPROCEDURE_GEN_ALL_CORE_FT
PROCEDURES:  MEEK flap, free 27-Mar-2019 16:48:00  Cassy Jacobs
PROCEDURES:  Axillary lymph node dissection with sentinel lymph node biopsy 27-Mar-2019 10:47:52 bilateral Owen Henry  Bilateral total mastectomy 27-Mar-2019 10:47:00  Owen Henry

## 2019-03-27 NOTE — CHART NOTE - NSCHARTNOTEFT_GEN_A_CORE
Seen s/p bilat mastectomy, bilat SLNBx, bilat fidel flap reconstruction by PLASTICS  No issues currently, no n/v while NPO, pain controlled  DENISSE drain x 6 with minimal SS output  Abd incision c/d/i  Bilat fidel flaps intact and viable, no bleeding  Pressure dressings placed around breasts b/l    PLAN:  -  care per PLASTICS  -  NPO tonight  -  monitor drain output  -  OOB to ambulate  -  pain control  -  will continue to follow    D SURGERY  [id number]

## 2019-03-27 NOTE — BRIEF OPERATIVE NOTE - NSICDXBRIEFPREOP_GEN_ALL_CORE_FT
PRE-OP DIAGNOSIS:  Invasive lobular carcinoma of left breast in female 27-Mar-2019 10:48:41  Owen Henry

## 2019-03-27 NOTE — BRIEF OPERATIVE NOTE - SPECIMENS
Left and right internal mammary lymph nodes
Lt breast, Rt breast, Lt sentinel lymph node, Rt sentinel lymph node

## 2019-03-27 NOTE — BRIEF OPERATIVE NOTE - OPERATION/FINDINGS
immediate bilateral breast reconstruction with MEEK flaps following bilateral mastectomies
bilateral breasts excisted with total mastectomy  1x axillary sentinel lymph node sent for frozen pathology from each side, both negative  closure per PLASTICS, see separate PLASTICS note for details

## 2019-03-28 LAB
ANION GAP SERPL CALC-SCNC: 12 MMO/L — SIGNIFICANT CHANGE UP (ref 7–14)
BUN SERPL-MCNC: 13 MG/DL — SIGNIFICANT CHANGE UP (ref 7–23)
CALCIUM SERPL-MCNC: 8.5 MG/DL — SIGNIFICANT CHANGE UP (ref 8.4–10.5)
CHLORIDE SERPL-SCNC: 107 MMOL/L — SIGNIFICANT CHANGE UP (ref 98–107)
CO2 SERPL-SCNC: 23 MMOL/L — SIGNIFICANT CHANGE UP (ref 22–31)
CREAT SERPL-MCNC: 1.05 MG/DL — SIGNIFICANT CHANGE UP (ref 0.5–1.3)
GLUCOSE SERPL-MCNC: 143 MG/DL — HIGH (ref 70–99)
HCT VFR BLD CALC: 35.2 % — SIGNIFICANT CHANGE UP (ref 34.5–45)
HGB BLD-MCNC: 11.3 G/DL — LOW (ref 11.5–15.5)
MCHC RBC-ENTMCNC: 29 PG — SIGNIFICANT CHANGE UP (ref 27–34)
MCHC RBC-ENTMCNC: 32.1 % — SIGNIFICANT CHANGE UP (ref 32–36)
MCV RBC AUTO: 90.5 FL — SIGNIFICANT CHANGE UP (ref 80–100)
NRBC # FLD: 0 K/UL — SIGNIFICANT CHANGE UP (ref 0–0)
PLATELET # BLD AUTO: 257 K/UL — SIGNIFICANT CHANGE UP (ref 150–400)
PMV BLD: 9.9 FL — SIGNIFICANT CHANGE UP (ref 7–13)
POTASSIUM SERPL-MCNC: 4.6 MMOL/L — SIGNIFICANT CHANGE UP (ref 3.5–5.3)
POTASSIUM SERPL-SCNC: 4.6 MMOL/L — SIGNIFICANT CHANGE UP (ref 3.5–5.3)
RBC # BLD: 3.89 M/UL — SIGNIFICANT CHANGE UP (ref 3.8–5.2)
RBC # FLD: 14.1 % — SIGNIFICANT CHANGE UP (ref 10.3–14.5)
SODIUM SERPL-SCNC: 142 MMOL/L — SIGNIFICANT CHANGE UP (ref 135–145)
WBC # BLD: 13.32 K/UL — HIGH (ref 3.8–10.5)
WBC # FLD AUTO: 13.32 K/UL — HIGH (ref 3.8–10.5)

## 2019-03-28 RX ORDER — ACETAMINOPHEN 500 MG
650 TABLET ORAL EVERY 6 HOURS
Qty: 0 | Refills: 0 | Status: DISCONTINUED | OUTPATIENT
Start: 2019-03-28 | End: 2019-03-30

## 2019-03-28 RX ORDER — DESVENLAFAXINE 50 MG/1
100 TABLET, EXTENDED RELEASE ORAL DAILY
Qty: 0 | Refills: 0 | Status: DISCONTINUED | OUTPATIENT
Start: 2019-03-28 | End: 2019-03-30

## 2019-03-28 RX ORDER — BUDESONIDE AND FORMOTEROL FUMARATE DIHYDRATE 160; 4.5 UG/1; UG/1
2 AEROSOL RESPIRATORY (INHALATION)
Qty: 0 | Refills: 0 | Status: DISCONTINUED | OUTPATIENT
Start: 2019-03-28 | End: 2019-03-30

## 2019-03-28 RX ADMIN — Medication 15 MILLIGRAM(S): at 02:30

## 2019-03-28 RX ADMIN — Medication 15 MILLIGRAM(S): at 21:35

## 2019-03-28 RX ADMIN — OXYCODONE HYDROCHLORIDE 5 MILLIGRAM(S): 5 TABLET ORAL at 18:25

## 2019-03-28 RX ADMIN — Medication 15 MILLIGRAM(S): at 08:06

## 2019-03-28 RX ADMIN — OXYCODONE HYDROCHLORIDE 5 MILLIGRAM(S): 5 TABLET ORAL at 18:55

## 2019-03-28 RX ADMIN — SODIUM CHLORIDE 75 MILLILITER(S): 9 INJECTION, SOLUTION INTRAVENOUS at 21:19

## 2019-03-28 RX ADMIN — DESVENLAFAXINE 100 MILLIGRAM(S): 50 TABLET, EXTENDED RELEASE ORAL at 13:06

## 2019-03-28 RX ADMIN — Medication 15 MILLIGRAM(S): at 02:04

## 2019-03-28 RX ADMIN — HEPARIN SODIUM 5000 UNIT(S): 5000 INJECTION INTRAVENOUS; SUBCUTANEOUS at 06:45

## 2019-03-28 RX ADMIN — Medication 15 MILLIGRAM(S): at 14:54

## 2019-03-28 RX ADMIN — Medication 100 MILLIGRAM(S): at 06:45

## 2019-03-28 RX ADMIN — Medication 650 MILLIGRAM(S): at 19:30

## 2019-03-28 RX ADMIN — Medication 650 MILLIGRAM(S): at 18:25

## 2019-03-28 RX ADMIN — BUPROPION HYDROCHLORIDE 150 MILLIGRAM(S): 150 TABLET, EXTENDED RELEASE ORAL at 13:06

## 2019-03-28 RX ADMIN — Medication 400 MILLIGRAM(S): at 07:25

## 2019-03-28 RX ADMIN — HEPARIN SODIUM 5000 UNIT(S): 5000 INJECTION INTRAVENOUS; SUBCUTANEOUS at 18:25

## 2019-03-28 RX ADMIN — MONTELUKAST 10 MILLIGRAM(S): 4 TABLET, CHEWABLE ORAL at 13:07

## 2019-03-28 RX ADMIN — Medication 650 MILLIGRAM(S): at 13:07

## 2019-03-28 RX ADMIN — Medication 1000 MILLIGRAM(S): at 07:56

## 2019-03-28 RX ADMIN — Medication 15 MILLIGRAM(S): at 21:19

## 2019-03-28 NOTE — PROGRESS NOTE ADULT - ASSESSMENT
59F PMH Invasive lobular carcinoma, now POD 1 s/p bilateral mastectomies and bilateral MEEK flaps    - Continue regular diet as tolerated  - Pain control  - Will continue to follow  - Care per primary team    FAN Sunshine PGY2  D team Surgery  [id number]

## 2019-03-28 NOTE — PROGRESS NOTE ADULT - ATTENDING COMMENTS
I agree with above assessment and plan
Patient was seen by Dr. Smith and myself in PACU on Thursday March 28 AM  flaps viable  cont vioptix  cont DVT chemoppx

## 2019-03-28 NOTE — PROGRESS NOTE ADULT - SUBJECTIVE AND OBJECTIVE BOX
SURGICAL ONCOLOGY PROGRESS NOTE      Subjective:  Patient underwent BL mastectomies, BL MEEK flaps yesterday, tolerated procedure well. This AM pt feels well overall. Pain well controlled.         Objective:    PE:  Gen: Laying in bed, in NAD  Resp: Nonlabored  Chest: Flaps soft, pink, 2+ cap refill. Vioptix in place. DENISSE drains in place, serosanguinous output  Abd: incision c/d/i, DENISSE serosanguinous    Vital Signs Last 24 Hrs  T(C): 36.4 (28 Mar 2019 07:00), Max: 37 (27 Mar 2019 16:50)  T(F): 97.5 (28 Mar 2019 07:00), Max: 98.6 (27 Mar 2019 16:50)  HR: 70 (28 Mar 2019 08:00) (68 - 84)  BP: 119/61 (28 Mar 2019 08:00) (117/67 - 151/93)  BP(mean): 79 (28 Mar 2019 08:00) (78 - 109)  RR: 12 (28 Mar 2019 08:00) (10 - 21)  SpO2: 96% (28 Mar 2019 08:00) (96% - 98%)    I&O's Detail    27 Mar 2019 07:01  -  28 Mar 2019 07:00  --------------------------------------------------------  IN:    IV PiggyBack: 300 mL    lactated ringers.: 1750 mL  Total IN: 2050 mL    OUT:    Bulb: 29 mL    Bulb: 64 mL    Bulb: 69 mL    Bulb: 89 mL    Bulb: 34.5 mL    Bulb: 37 mL    Indwelling Catheter - Urethral: 1750 mL  Total OUT: 2072.5 mL    Total NET: -22.5 mL      28 Mar 2019 07:01  -  28 Mar 2019 10:20  --------------------------------------------------------  IN:    lactated ringers.: 125 mL  Total IN: 125 mL    OUT:    Indwelling Catheter - Urethral: 150 mL  Total OUT: 150 mL    Total NET: -25 mL          Daily     Daily     MEDICATIONS  (STANDING):  acetaminophen   Tablet .. 650 milliGRAM(s) Oral every 6 hours  buDESOnide 160 MICROgram(s)/formoterol 4.5 MICROgram(s) Inhaler 2 Puff(s) Inhalation two times a day  buPROPion XL . 150 milliGRAM(s) Oral daily  desvenlafaxine  milliGRAM(s) Oral daily  heparin  Injectable 5000 Unit(s) SubCutaneous every 12 hours  ketorolac   Injectable 15 milliGRAM(s) IV Push every 6 hours  lactated ringers. 1000 milliLiter(s) (75 mL/Hr) IV Continuous <Continuous>  montelukast 10 milliGRAM(s) Oral daily    MEDICATIONS  (PRN):  HYDROmorphone  Injectable 0.5 milliGRAM(s) IV Push every 10 minutes PRN Severe Pain (7 - 10)  ondansetron Injectable 4 milliGRAM(s) IV Push once PRN Nausea and/or Vomiting  ondansetron Injectable 4 milliGRAM(s) IV Push every 6 hours PRN Nausea  oxyCODONE    IR 5 milliGRAM(s) Oral every 4 hours PRN Severe Pain (7 - 10)      LABS:                        11.3   13.32 )-----------( 257      ( 28 Mar 2019 06:00 )             35.2     03-28    142  |  107  |  13  ----------------------------<  143<H>  4.6   |  23  |  1.05    Ca    8.5      28 Mar 2019 06:00            RADIOLOGY & ADDITIONAL STUDIES:

## 2019-03-28 NOTE — PROGRESS NOTE ADULT - ASSESSMENT
60 y/o F s/p bilateral mastectomies and bilateral MEEK flaps on 3/27/2018  - okay to transfer to floor  - q2h flap checks  - OOB to chair  - remove [place] once OOB to chair  - ambulate this afternoon  - regular diet  - pain control  - DENISSE care  - IS  - DVT ppx    Plastic Surgery (pg BREE: [id number], NS: [phone])

## 2019-03-28 NOTE — PROGRESS NOTE ADULT - SUBJECTIVE AND OBJECTIVE BOX
Plastic Surgery Progress Note (pg [facility]: [id number], NS: [phone])    SUBJECTIVE:  The patient was seen and examined in PACU this morning during rounds. No acute events overnight. Pain controlled.    OBJECTIVE:     ** VITAL SIGNS / I&O's **    Vital Signs Last 24 Hrs  T(C): 36.4 (28 Mar 2019 07:00), Max: 37 (27 Mar 2019 16:50)  T(F): 97.5 (28 Mar 2019 07:00), Max: 98.6 (27 Mar 2019 16:50)  HR: 70 (28 Mar 2019 08:00) (68 - 84)  BP: 119/61 (28 Mar 2019 08:00) (117/67 - 151/93)  BP(mean): 79 (28 Mar 2019 08:00) (78 - 109)  RR: 12 (28 Mar 2019 08:00) (10 - 21)  SpO2: 96% (28 Mar 2019 08:00) (96% - 98%)      27 Mar 2019 07:01  -  28 Mar 2019 07:00  --------------------------------------------------------  IN:    IV PiggyBack: 300 mL    lactated ringers.: 1750 mL  Total IN: 2050 mL    OUT:    Bulb: 29 mL    Bulb: 64 mL    Bulb: 69 mL    Bulb: 89 mL    Bulb: 34.5 mL    Bulb: 37 mL    Indwelling Catheter - Urethral: 1750 mL  Total OUT: 2072.5 mL    Total NET: -22.5 mL      28 Mar 2019 07:01  -  28 Mar 2019 08:23  --------------------------------------------------------  IN:    lactated ringers.: 125 mL  Total IN: 125 mL    OUT:    Indwelling Catheter - Urethral: 150 mL  Total OUT: 150 mL    Total NET: -25 mL          ** PHYSICAL EXAM **    -- CONSTITUTIONAL: Alert, NAD   -- CHEST: bilateral MEEK flaps soft, pink,  2-3 sec capillary refill, warm, incisions c/d/i, ViOptix stable   -- PULM: non-labored respirations  -- ABDOMEN:   -- EXTREMITIES:     ** LABS **                          11.3   13.32 )-----------( 257      ( 28 Mar 2019 06:00 )             35.2     28 Mar 2019 06:00    142    |  107    |  13     ----------------------------<  143    4.6     |  23     |  1.05     Ca    8.5        28 Mar 2019 06:00        CAPILLARY BLOOD GLUCOSE              Recent Cultures:        ** MEDICATIONS **  MEDICATIONS  (STANDING):  acetaminophen   Tablet .. 650 milliGRAM(s) Oral every 6 hours  buDESOnide 160 MICROgram(s)/formoterol 4.5 MICROgram(s) Inhaler 2 Puff(s) Inhalation two times a day  buPROPion XL . 150 milliGRAM(s) Oral daily  heparin  Injectable 5000 Unit(s) SubCutaneous every 12 hours  ketorolac   Injectable 15 milliGRAM(s) IV Push every 6 hours  lactated ringers. 1000 milliLiter(s) (75 mL/Hr) IV Continuous <Continuous>  montelukast 10 milliGRAM(s) Oral daily    MEDICATIONS  (PRN):  HYDROmorphone  Injectable 0.5 milliGRAM(s) IV Push every 10 minutes PRN Severe Pain (7 - 10)  ondansetron Injectable 4 milliGRAM(s) IV Push once PRN Nausea and/or Vomiting  ondansetron Injectable 4 milliGRAM(s) IV Push every 6 hours PRN Nausea  oxyCODONE    IR 5 milliGRAM(s) Oral every 4 hours PRN Severe Pain (7 - 10) Plastic Surgery Progress Note (pg [facility]: [id number], NS: [phone])    SUBJECTIVE:  The patient was seen and examined in PACU this morning during rounds. No acute events overnight. Pain controlled.    OBJECTIVE:     ** VITAL SIGNS / I&O's **    Vital Signs Last 24 Hrs  T(C): 36.4 (28 Mar 2019 07:00), Max: 37 (27 Mar 2019 16:50)  T(F): 97.5 (28 Mar 2019 07:00), Max: 98.6 (27 Mar 2019 16:50)  HR: 70 (28 Mar 2019 08:00) (68 - 84)  BP: 119/61 (28 Mar 2019 08:00) (117/67 - 151/93)  BP(mean): 79 (28 Mar 2019 08:00) (78 - 109)  RR: 12 (28 Mar 2019 08:00) (10 - 21)  SpO2: 96% (28 Mar 2019 08:00) (96% - 98%)      27 Mar 2019 07:01  -  28 Mar 2019 07:00  --------------------------------------------------------  IN:    IV PiggyBack: 300 mL    lactated ringers.: 1750 mL  Total IN: 2050 mL    OUT:    Bulb: 29 mL    Bulb: 64 mL    Bulb: 69 mL    Bulb: 89 mL    Bulb: 34.5 mL    Bulb: 37 mL    Indwelling Catheter - Urethral: 1750 mL  Total OUT: 2072.5 mL    Total NET: -22.5 mL      28 Mar 2019 07:01  -  28 Mar 2019 08:23  --------------------------------------------------------  IN:    lactated ringers.: 125 mL  Total IN: 125 mL    OUT:    Indwelling Catheter - Urethral: 150 mL  Total OUT: 150 mL    Total NET: -25 mL          ** PHYSICAL EXAM **    -- CONSTITUTIONAL: Alert, NAD   -- CHEST: bilateral MEEK flaps soft, pink,  2-3 sec capillary refill, warm, incisions c/d/i, ViOptix stable at R 59, L 49. No collections appreciated. Mastectomy skin flaps with some ecchymosis. DENISSE drains serosanguinous.  -- PULM: non-labored respirations  -- ABDOMEN: incision c/d/i, no collections appreciated, DENISSE serosanguinous    ** LABS **                          11.3   13.32 )-----------( 257      ( 28 Mar 2019 06:00 )             35.2     28 Mar 2019 06:00    142    |  107    |  13     ----------------------------<  143    4.6     |  23     |  1.05     Ca    8.5        28 Mar 2019 06:00        ** MEDICATIONS **  MEDICATIONS  (STANDING):  acetaminophen   Tablet .. 650 milliGRAM(s) Oral every 6 hours  buDESOnide 160 MICROgram(s)/formoterol 4.5 MICROgram(s) Inhaler 2 Puff(s) Inhalation two times a day  buPROPion XL . 150 milliGRAM(s) Oral daily  heparin  Injectable 5000 Unit(s) SubCutaneous every 12 hours  ketorolac   Injectable 15 milliGRAM(s) IV Push every 6 hours  lactated ringers. 1000 milliLiter(s) (75 mL/Hr) IV Continuous <Continuous>  montelukast 10 milliGRAM(s) Oral daily    MEDICATIONS  (PRN):  HYDROmorphone  Injectable 0.5 milliGRAM(s) IV Push every 10 minutes PRN Severe Pain (7 - 10)  ondansetron Injectable 4 milliGRAM(s) IV Push once PRN Nausea and/or Vomiting  ondansetron Injectable 4 milliGRAM(s) IV Push every 6 hours PRN Nausea  oxyCODONE    IR 5 milliGRAM(s) Oral every 4 hours PRN Severe Pain (7 - 10)

## 2019-03-29 ENCOUNTER — TRANSCRIPTION ENCOUNTER (OUTPATIENT)
Age: 59
End: 2019-03-29

## 2019-03-29 RX ADMIN — Medication 650 MILLIGRAM(S): at 01:39

## 2019-03-29 RX ADMIN — Medication 650 MILLIGRAM(S): at 18:14

## 2019-03-29 RX ADMIN — Medication 15 MILLIGRAM(S): at 18:14

## 2019-03-29 RX ADMIN — BUDESONIDE AND FORMOTEROL FUMARATE DIHYDRATE 2 PUFF(S): 160; 4.5 AEROSOL RESPIRATORY (INHALATION) at 12:15

## 2019-03-29 RX ADMIN — Medication 15 MILLIGRAM(S): at 12:17

## 2019-03-29 RX ADMIN — OXYCODONE HYDROCHLORIDE 5 MILLIGRAM(S): 5 TABLET ORAL at 17:00

## 2019-03-29 RX ADMIN — HEPARIN SODIUM 5000 UNIT(S): 5000 INJECTION INTRAVENOUS; SUBCUTANEOUS at 18:14

## 2019-03-29 RX ADMIN — Medication 15 MILLIGRAM(S): at 23:06

## 2019-03-29 RX ADMIN — Medication 15 MILLIGRAM(S): at 05:55

## 2019-03-29 RX ADMIN — OXYCODONE HYDROCHLORIDE 5 MILLIGRAM(S): 5 TABLET ORAL at 16:29

## 2019-03-29 RX ADMIN — HEPARIN SODIUM 5000 UNIT(S): 5000 INJECTION INTRAVENOUS; SUBCUTANEOUS at 05:55

## 2019-03-29 RX ADMIN — DESVENLAFAXINE 100 MILLIGRAM(S): 50 TABLET, EXTENDED RELEASE ORAL at 12:16

## 2019-03-29 RX ADMIN — BUDESONIDE AND FORMOTEROL FUMARATE DIHYDRATE 2 PUFF(S): 160; 4.5 AEROSOL RESPIRATORY (INHALATION) at 21:45

## 2019-03-29 RX ADMIN — Medication 650 MILLIGRAM(S): at 12:16

## 2019-03-29 RX ADMIN — SODIUM CHLORIDE 75 MILLILITER(S): 9 INJECTION, SOLUTION INTRAVENOUS at 01:39

## 2019-03-29 RX ADMIN — SODIUM CHLORIDE 75 MILLILITER(S): 9 INJECTION, SOLUTION INTRAVENOUS at 05:55

## 2019-03-29 RX ADMIN — MONTELUKAST 10 MILLIGRAM(S): 4 TABLET, CHEWABLE ORAL at 12:17

## 2019-03-29 RX ADMIN — BUPROPION HYDROCHLORIDE 150 MILLIGRAM(S): 150 TABLET, EXTENDED RELEASE ORAL at 12:16

## 2019-03-29 NOTE — DISCHARGE NOTE PROVIDER - CARE PROVIDERS DIRECT ADDRESSES
[email or web address]

## 2019-03-29 NOTE — PROGRESS NOTE ADULT - ASSESSMENT
60 y/o F s/p bilateral mastectomies and bilateral MEEK flaps on 3/27    >> d/c Vioptix today.  >> Continue antibiotics for total x48 hours.  >> Continue DVT prophylaxis (SQH and SCDs).  >> Continue standing IV Toradol and IV Tylenol. Transition to PO Tylenol and PO Toradol.  >> d/c IVF.  >> Regular diet.  >> Incentive spirometry.  >> HOB at 30 degrees; Bed flexed at hips.  >> Ambulate with assistance.  >> Disposition planning - likely tomorrow

## 2019-03-29 NOTE — PROGRESS NOTE ADULT - ASSESSMENT
59F PMH Invasive lobular carcinoma, now POD2 s/p bilateral mastectomies and bilateral MEEK flaps    - Continue regular diet as tolerated  - Pain control  - Will continue to follow  - Care and dispo per primary team    FAN Sunshine PGY2  D team Surgery  [id number]

## 2019-03-29 NOTE — DISCHARGE NOTE PROVIDER - CARE PROVIDER_API CALL
Anmol Mcclain; JOSE RAMON)  Otolaryngology; Plastic Surgery  [address] [place] [address] [address]  [place] [address]  Phone: [phone]  Fax: [phone]  Follow Up Time:     Davy Disla)  Surgery  [address] [place] [id number]  Phone: [phone]  Fax: [phone]  Follow Up Time:

## 2019-03-29 NOTE — PROGRESS NOTE ADULT - SUBJECTIVE AND OBJECTIVE BOX
SURGICAL ONCOLOGY PROGRESS NOTE      Subjective:  No acute events overnight. This AM pt feeling well overall.         Objective:    PE:  Gen: Laying in bed, in NAD  Resp: Nonlabored  Chest: Flaps soft, pink, 2+ cap refill. Vioptix in place. DENISSE drains in place, serosanguinous output  Abd: incision c/d/i, DENISSE serosanguinous      Vital Signs Last 24 Hrs  T(C): 36.4 (29 Mar 2019 05:50), Max: 36.9 (28 Mar 2019 21:46)  T(F): 97.6 (29 Mar 2019 05:50), Max: 98.5 (28 Mar 2019 21:46)  HR: 76 (29 Mar 2019 05:50) (72 - 703)  BP: 133/66 (29 Mar 2019 05:50) (114/57 - 133/66)  BP(mean): --  RR: 18 (29 Mar 2019 05:50) (12 - 18)  SpO2: 99% (29 Mar 2019 05:50) (98% - 100%)    I&O's Detail    28 Mar 2019 07:01  -  29 Mar 2019 07:00  --------------------------------------------------------  IN:    lactated ringers.: 125 mL    lactated ringers.: 900 mL  Total IN: 1025 mL    OUT:    Bulb: 95 mL    Bulb: 152 mL    Bulb: 108 mL    Bulb: 140 mL    Bulb: 47.5 mL    Bulb: 50 mL    Indwelling Catheter - Urethral: 750 mL    Voided: 725 mL  Total OUT: 2067.5 mL    Total NET: -1042.5 mL          Daily     Daily     MEDICATIONS  (STANDING):  acetaminophen   Tablet .. 650 milliGRAM(s) Oral every 6 hours  buDESOnide 160 MICROgram(s)/formoterol 4.5 MICROgram(s) Inhaler 2 Puff(s) Inhalation two times a day  buPROPion XL . 150 milliGRAM(s) Oral daily  desvenlafaxine  milliGRAM(s) Oral daily  heparin  Injectable 5000 Unit(s) SubCutaneous every 12 hours  ketorolac   Injectable 15 milliGRAM(s) IV Push every 6 hours  lactated ringers. 1000 milliLiter(s) (75 mL/Hr) IV Continuous <Continuous>  montelukast 10 milliGRAM(s) Oral daily    MEDICATIONS  (PRN):  HYDROmorphone  Injectable 0.5 milliGRAM(s) IV Push every 10 minutes PRN Severe Pain (7 - 10)  ondansetron Injectable 4 milliGRAM(s) IV Push once PRN Nausea and/or Vomiting  ondansetron Injectable 4 milliGRAM(s) IV Push every 6 hours PRN Nausea  oxyCODONE    IR 5 milliGRAM(s) Oral every 4 hours PRN Severe Pain (7 - 10)      LABS:                        11.3   13.32 )-----------( 257      ( 28 Mar 2019 06:00 )             35.2     03-28    142  |  107  |  13  ----------------------------<  143<H>  4.6   |  23  |  1.05    Ca    8.5      28 Mar 2019 06:00            RADIOLOGY & ADDITIONAL STUDIES:

## 2019-03-29 NOTE — DISCHARGE NOTE PROVIDER - HOSPITAL COURSE
The patient underwent bilateral simple mastectomies and bilateral deep inferior epigastric  flap breast reconstruction with Dr. Mcclain in the OR. The patient tolerated the procedure well. 6 drains were placed: 2 in each breast and 2 in the abdomen. Postoperatively the patient was sent to the PACU. The patient remained in the PACU overnight. The patient's flaps were monitored by Vioptix and by clinical examination. On POD 1, the patient was hemodynamically stable; was transferred to a surgical floor; was advanced to a regular diet; was placed on her home medications; was out of bed to a chair and ambulating, and the patient's Villa catheter was removed. On POD 2, the Vioptix monitors were removed. During the patient's hospital course, the patient's pain was controlled by IV pain medications and then by PO pain medications.        At the time of discharge, the patient was hemodynamically stable, was tolerating PO diet, was voiding urine and passing stool, was ambulating, and was comfortable with adequate pain control. The patient was instructed to follow up with Dr. Mcclain within x1 week(s) after discharge from the hospital and Dr. Disla within x1 week(s) after discharge from the hospital. The patient felt comfortable with discharge. The patient received prescriptions for oral pain medication preoperatively from the attending surgeon. The patient had no other issues.

## 2019-03-29 NOTE — PROGRESS NOTE ADULT - SUBJECTIVE AND OBJECTIVE BOX
The patient is doing well.   She has no c/o  She denies cp, sob, fevers, calf pain.  She is OOB and ambulating  She is using CPAP at night    On exam, both breast flaps are soft and viable.  The vioptix is stable.  There is good cap refill of the skin.  There is no mastectomy skin flap ischemia.  The abdomen is soft.  Incisions are intact.

## 2019-03-29 NOTE — DISCHARGE NOTE PROVIDER - NSDCFUADDINST_GEN_ALL_CORE_FT
Activity:  - Rest at home during the first few days after surgery.  - Walking is encouraged, but strenuous exercise is not allowed until 6 weeks after surgery.   - Avoid strenuous activity. Do not lift your arms above your head. Do not lift more than 5-10 pounds.    Sleep:  Sleep on your back for the first two weeks after surgery.    Showering:  - You may take sponge baths following discharge. Pat dry. We will instruct you to shower once you have drains removed from your breasts in the office.  - Do not take a bath or submerge yourself in water.  - You will have special adhesive glue or tape over the incisions. Do not take these off.  For the Breast:  You have just undergone a breast reconstruction with the MEEK flap. Your breast will likely have bruising and possibly some blistering on the skin, which is expected after a mastectomy. You have a small patch of skin on your breasts, which is a different color than the surrounding breast skin. This paddle of skin comes from the abdomen and is an indicator of how the flap is doing. It is important to check this skin paddle daily. The skin should remain the same color. If the color of the small patch changes (i.e blue, purple, pale), please call the office immediately ([phone]).    For the Abdomen:  Your incision and belly button are covered in a special medical grade sealant, which will come off in the office, 2-3 weeks after surgery.    Drains:  Both the breast and abdomen will have drains. It is important to empty the drains twice daily and record the outputs. Please bring this sheet to your appointment after surgery. Based on the output, the drains will be removed 1 to 3 weeks after surgery. For the drains to be working appropriately, the bulbs need to be collapsed to create a light suction. The nurse in the hospital will review the drain care with you and your family prior to discharge home. It is best to safely secure the drains to your clothes with a safety pin.  In an effort to make you more comfortable with discharge home and to answer any questions you may have, I have prepared an instructional sheet for you. However, you may call the office at [phone] at any time with additional questions or concerns.    Call the Office:  Do not hesitate to call the office with any concerns or questions. A doctor is available to answer your questions 24 hours a day.   Please notify us immediately at [phone] if:  1. You have increased swelling, pain, or color change in the breast.  2. One breast becomes suddenly significantly larger than the other breast.  3. You have a sudden increase in swelling of the abdomen.  4. You have redness develop around the incisions.  5. You have a fever greater than 101 F.  6. You develop sudden increase in pain.  7. You develop drainage, spreading redness or foul odor  8. You have any questions.

## 2019-03-29 NOTE — DISCHARGE NOTE PROVIDER - NSDCCPTREATMENT_GEN_ALL_CORE_FT
PRINCIPAL PROCEDURE  Procedure: MEEK flap, free  Findings and Treatment:       SECONDARY PROCEDURE  Procedure: Axillary lymph node dissection with sentinel lymph node biopsy  Findings and Treatment: bilateral

## 2019-03-29 NOTE — PROGRESS NOTE ADULT - ASSESSMENT
plan:  d/c vioptix  continue foam tape with drains  f/u Monday april 1 - office to call for appt  sleep on back  dispo planning - home nursing

## 2019-03-29 NOTE — PROGRESS NOTE ADULT - SUBJECTIVE AND OBJECTIVE BOX
SUBJECTIVE:  Patient seen and examined at bedside during AM rounds, doing well, no acute events overnight.  Pain is well-controlled, denies nausea/vomiting, chest pain, SOB, fevers/chills. Walking, voiding, eating.    Patient requesting to leave tomorrow.     OBJECTIVE:     ** VITAL SIGNS / I&O's **    T(C): 36.9 (03-29-19 @ 10:28), Max: 36.9 (03-28-19 @ 21:46)  T(F): 98.4 (03-29-19 @ 10:28), Max: 98.5 (03-28-19 @ 21:46)  HR: 89 (03-29-19 @ 10:28) (75 - 89)  BP: 127/56 (03-29-19 @ 10:28) (123/58 - 133/66)  RR: 18 (03-29-19 @ 10:28) (16 - 18)  SpO2: 97% (03-29-19 @ 10:28) (97% - 99%)      28 Mar 2019 07:01  -  29 Mar 2019 07:00  --------------------------------------------------------  IN:    lactated ringers.: 125 mL    lactated ringers.: 900 mL  Total IN: 1025 mL    OUT:    Bulb: 95 mL    Bulb: 152 mL    Bulb: 108 mL    Bulb: 140 mL    Bulb: 47.5 mL    Bulb: 50 mL    Indwelling Catheter - Urethral: 750 mL    Voided: 725 mL  Total OUT: 2067.5 mL    Total NET: -1042.5 mL      ** PHYSICAL EXAM **    -- CONSTITUTIONAL: AOx3. NAD.   -- RIGHT BREAST / FLAP: Mastectomy skin flap ecchymosis, stable. No collections. Flap soft and pink with 2-3 second capillary refill. Vioptix: 60%. Drains serosanguinous.  -- LEFT BREAST / FLAP: Mastectomy skin flap ecchymosis, stable. No collections. Flap soft and pink with 2-3 second capillary refill. Vioptix: 51%. Drains serosanguinous.  -- RESPIRATORY: on CPAP overnight, no increased work of breathing  -- ABDOMEN: Soft. No collections. Incision intact. Umbilicus viable. Drains serosanguinous.

## 2019-03-30 VITALS
DIASTOLIC BLOOD PRESSURE: 87 MMHG | HEART RATE: 78 BPM | SYSTOLIC BLOOD PRESSURE: 102 MMHG | RESPIRATION RATE: 18 BRPM | TEMPERATURE: 98 F | OXYGEN SATURATION: 99 %

## 2019-03-30 RX ADMIN — BUDESONIDE AND FORMOTEROL FUMARATE DIHYDRATE 2 PUFF(S): 160; 4.5 AEROSOL RESPIRATORY (INHALATION) at 10:13

## 2019-03-30 RX ADMIN — OXYCODONE HYDROCHLORIDE 5 MILLIGRAM(S): 5 TABLET ORAL at 12:56

## 2019-03-30 RX ADMIN — Medication 15 MILLIGRAM(S): at 06:14

## 2019-03-30 RX ADMIN — Medication 650 MILLIGRAM(S): at 06:13

## 2019-03-30 RX ADMIN — HEPARIN SODIUM 5000 UNIT(S): 5000 INJECTION INTRAVENOUS; SUBCUTANEOUS at 06:14

## 2019-03-30 RX ADMIN — OXYCODONE HYDROCHLORIDE 5 MILLIGRAM(S): 5 TABLET ORAL at 12:26

## 2019-03-30 NOTE — PROGRESS NOTE ADULT - SUBJECTIVE AND OBJECTIVE BOX
SUBJECTIVE:  Patient seen and examined at bedside, doing well, no acute events overnight.  Pain is well-controlled, denies nausea/vomiting, chest pain, SOB, fevers/chills.     OBJECTIVE:     ** VITAL SIGNS / I&O's **    T(C): 36.6 (03-30-19 @ 06:11), Max: 36.9 (03-29-19 @ 10:28)  T(F): 97.8 (03-30-19 @ 06:11), Max: 98.4 (03-29-19 @ 10:28)  HR: 79 (03-30-19 @ 06:24) (68 - 92)  BP: 142/67 (03-30-19 @ 06:24) (127/56 - 142/67)  RR: 19 (03-30-19 @ 06:24) (18 - 19)  SpO2: 100% (03-30-19 @ 06:24) (97% - 100%)      29 Mar 2019 07:01  -  30 Mar 2019 07:00  --------------------------------------------------------  IN:  Total IN: 0 mL    OUT:    Bulb: 112 mL    Bulb: 195 mL    Bulb: 144 mL    Bulb: 75 mL    Bulb: 141 mL    Bulb: 83 mL    Voided: 200 mL  Total OUT: 950 mL    Total NET: -950 mL      ** PHYSICAL EXAM **    -- CONSTITUTIONAL: AOx3. NAD.   -- RIGHT BREAST / FLAP: Mastectomy skin flap moderate ecchymosis, stable. No collections. Flap soft and pink with 2-3 second capillary refill. Drains serosanguinous.  -- LEFT BREAST / FLAP: Mastectomy skin flap moderate ecchymosis, stable. No collections. Flap soft and pink with 2-3 second capillary refill. Drains serosanguinous.  -- RESPIRATORY: no increased work of breathing  -- ABDOMEN: Soft. No collections. Incision intact. Umbilicus viable. Drains serosanguinous.

## 2019-03-30 NOTE — PROGRESS NOTE ADULT - ASSESSMENT
59F PMH Invasive lobular carcinoma, now POD2 s/p bilateral mastectomies and bilateral MEEK flaps    - Continue regular diet as tolerated  - Pain control  - Please have patient follow up with Dr. Disla as an outpatient after discharge  - Likely DC today    FAN Sunshine PGY2  D team Surgery  [id number] 59F PMH Invasive lobular carcinoma, now POD3 s/p bilateral mastectomies and bilateral MEEK flaps    - Continue regular diet as tolerated  - Pain control  - Please have patient follow up with Dr. Disla as an outpatient after discharge  - Likely DC today    FAN Sunshine PGY2  D team Surgery  [id number]

## 2019-03-30 NOTE — PROGRESS NOTE ADULT - ASSESSMENT
60 y/o F s/p bilateral mastectomies and bilateral MEEK flaps on 3/27    >> Continue q4H flap checks.  >> Continue DVT prophylaxis (SQH and SCDs).  >> Continue standing IV Toradol and IV Tylenol. Transition to PO Tylenol and PO Toradol.  >> Regular diet.  >> Incentive spirometry.  >> HOB at 30 degrees; Bed flexed at hips.  >> Ambulate with assistance.  >> Disposition planning - ok for discharge today with VNS

## 2019-03-30 NOTE — PROGRESS NOTE ADULT - SUBJECTIVE AND OBJECTIVE BOX
SURGICAL ONCOLOGY PROGRESS NOTE      Subjective:  No acute events overnight       Objective:    PE:  Gen: Laying in bed, in NAD  Resp: Nonlabored  Chest: Flaps soft, pink, 2+ cap refill. Vioptix in place. DENISSE drains in place, serosanguinous output  Abd: incision c/d/i, DENISSE serosanguinous    Vital Signs Last 24 Hrs  T(C): 36.6 (30 Mar 2019 06:11), Max: 36.9 (29 Mar 2019 10:28)  T(F): 97.8 (30 Mar 2019 06:11), Max: 98.4 (29 Mar 2019 10:28)  HR: 79 (30 Mar 2019 06:24) (68 - 92)  BP: 142/67 (30 Mar 2019 06:24) (127/56 - 142/67)  BP(mean): --  RR: 19 (30 Mar 2019 06:24) (18 - 19)  SpO2: 100% (30 Mar 2019 06:24) (97% - 100%)    I&O's Detail    29 Mar 2019 07:01  -  30 Mar 2019 07:00  --------------------------------------------------------  IN:  Total IN: 0 mL    OUT:    Bulb: 66 mL    Bulb: 116 mL    Bulb: 125 mL    Bulb: 92 mL    Bulb: 60 mL    Bulb: 170 mL    Voided: 200 mL  Total OUT: 829 mL    Total NET: -829 mL          Daily     Daily     MEDICATIONS  (STANDING):  acetaminophen   Tablet .. 650 milliGRAM(s) Oral every 6 hours  buDESOnide 160 MICROgram(s)/formoterol 4.5 MICROgram(s) Inhaler 2 Puff(s) Inhalation two times a day  buPROPion XL . 150 milliGRAM(s) Oral daily  desvenlafaxine  milliGRAM(s) Oral daily  heparin  Injectable 5000 Unit(s) SubCutaneous every 12 hours  ketorolac   Injectable 15 milliGRAM(s) IV Push every 6 hours  montelukast 10 milliGRAM(s) Oral daily    MEDICATIONS  (PRN):  HYDROmorphone  Injectable 0.5 milliGRAM(s) IV Push every 10 minutes PRN Severe Pain (7 - 10)  ondansetron Injectable 4 milliGRAM(s) IV Push once PRN Nausea and/or Vomiting  ondansetron Injectable 4 milliGRAM(s) IV Push every 6 hours PRN Nausea  oxyCODONE    IR 5 milliGRAM(s) Oral every 4 hours PRN Severe Pain (7 - 10)      LABS:                RADIOLOGY & ADDITIONAL STUDIES: SURGICAL ONCOLOGY PROGRESS NOTE      Subjective:  No acute events overnight       Objective:    PE:  Gen: Laying in bed, in NAD  Resp: Nonlabored  Chest: Flaps soft, pink, 2+ cap refill. DENISSE drains in place, serosanguinous output  Abd: incision c/d/i, DENISSE serosanguinous    Vital Signs Last 24 Hrs  T(C): 36.6 (30 Mar 2019 06:11), Max: 36.9 (29 Mar 2019 10:28)  T(F): 97.8 (30 Mar 2019 06:11), Max: 98.4 (29 Mar 2019 10:28)  HR: 79 (30 Mar 2019 06:24) (68 - 92)  BP: 142/67 (30 Mar 2019 06:24) (127/56 - 142/67)  BP(mean): --  RR: 19 (30 Mar 2019 06:24) (18 - 19)  SpO2: 100% (30 Mar 2019 06:24) (97% - 100%)    I&O's Detail    29 Mar 2019 07:01  -  30 Mar 2019 07:00  --------------------------------------------------------  IN:  Total IN: 0 mL    OUT:    Bulb: 66 mL    Bulb: 116 mL    Bulb: 125 mL    Bulb: 92 mL    Bulb: 60 mL    Bulb: 170 mL    Voided: 200 mL  Total OUT: 829 mL    Total NET: -829 mL          Daily     Daily     MEDICATIONS  (STANDING):  acetaminophen   Tablet .. 650 milliGRAM(s) Oral every 6 hours  buDESOnide 160 MICROgram(s)/formoterol 4.5 MICROgram(s) Inhaler 2 Puff(s) Inhalation two times a day  buPROPion XL . 150 milliGRAM(s) Oral daily  desvenlafaxine  milliGRAM(s) Oral daily  heparin  Injectable 5000 Unit(s) SubCutaneous every 12 hours  ketorolac   Injectable 15 milliGRAM(s) IV Push every 6 hours  montelukast 10 milliGRAM(s) Oral daily    MEDICATIONS  (PRN):  HYDROmorphone  Injectable 0.5 milliGRAM(s) IV Push every 10 minutes PRN Severe Pain (7 - 10)  ondansetron Injectable 4 milliGRAM(s) IV Push once PRN Nausea and/or Vomiting  ondansetron Injectable 4 milliGRAM(s) IV Push every 6 hours PRN Nausea  oxyCODONE    IR 5 milliGRAM(s) Oral every 4 hours PRN Severe Pain (7 - 10)      LABS:                RADIOLOGY & ADDITIONAL STUDIES:

## 2019-04-01 ENCOUNTER — APPOINTMENT (OUTPATIENT)
Age: 59
End: 2019-04-01
Payer: COMMERCIAL

## 2019-04-01 PROCEDURE — 99024 POSTOP FOLLOW-UP VISIT: CPT

## 2019-04-01 NOTE — REASON FOR VISIT
[Post Op: _________] : a [unfilled] post op visit [FreeTextEntry1] : DOS: 3/27/19 s/p bilateral mastectomy w/ MEEK. Patient states she is doing well; patient states she has six drains last checked on 3/31/19. Patient taking Percocet and aspirin.

## 2019-04-02 ENCOUNTER — TRANSCRIPTION ENCOUNTER (OUTPATIENT)
Age: 59
End: 2019-04-02

## 2019-04-03 LAB — SURGICAL PATHOLOGY STUDY: SIGNIFICANT CHANGE UP

## 2019-04-04 ENCOUNTER — APPOINTMENT (OUTPATIENT)
Age: 59
End: 2019-04-04
Payer: COMMERCIAL

## 2019-04-04 PROCEDURE — 99024 POSTOP FOLLOW-UP VISIT: CPT

## 2019-04-07 NOTE — REASON FOR VISIT
[Post Op: _________] : a [unfilled] post op visit [FreeTextEntry1] :  DOS: 3/27/19 s/p bilateral mastectomy w/ MEEK. Patient states she is doing well; patient states she has drains.

## 2019-04-08 ENCOUNTER — APPOINTMENT (OUTPATIENT)
Dept: PLASTIC SURGERY | Facility: CLINIC | Age: 59
End: 2019-04-08
Payer: COMMERCIAL

## 2019-04-08 PROCEDURE — 99024 POSTOP FOLLOW-UP VISIT: CPT

## 2019-04-15 ENCOUNTER — OTHER (OUTPATIENT)
Age: 59
End: 2019-04-15

## 2019-04-15 ENCOUNTER — APPOINTMENT (OUTPATIENT)
Dept: PLASTIC SURGERY | Facility: CLINIC | Age: 59
End: 2019-04-15
Payer: COMMERCIAL

## 2019-04-15 ENCOUNTER — APPOINTMENT (OUTPATIENT)
Dept: SURGICAL ONCOLOGY | Facility: CLINIC | Age: 59
End: 2019-04-15
Payer: COMMERCIAL

## 2019-04-15 VITALS
DIASTOLIC BLOOD PRESSURE: 78 MMHG | WEIGHT: 240 LBS | HEIGHT: 62 IN | HEART RATE: 115 BPM | OXYGEN SATURATION: 98 % | BODY MASS INDEX: 44.16 KG/M2 | SYSTOLIC BLOOD PRESSURE: 124 MMHG

## 2019-04-15 PROCEDURE — 99024 POSTOP FOLLOW-UP VISIT: CPT

## 2019-04-15 NOTE — HISTORY OF PRESENT ILLNESS
[de-identified] : 59-year-old woman status post bilateral mastectomy with bilateral MEEK reconstruction by Dr. Henok Mcclain.\par \par healing well except some mast flap edges with some drainage.\par One abd drain remains in place

## 2019-04-15 NOTE — ASSESSMENT
[FreeTextEntry1] : Doing well status post bilateral mastectomy.\par Patient has an 8 mm invasive lobular carcinoma in the left breast. Right breast contained L. CIS on the lymph nodes were negative.\par \par Needs med onc pending Oncotype DX\par \par RTO 6 months

## 2019-04-17 ENCOUNTER — APPOINTMENT (OUTPATIENT)
Dept: PLASTIC SURGERY | Facility: CLINIC | Age: 59
End: 2019-04-17

## 2019-04-17 NOTE — REASON FOR VISIT
[Post Op: _________] : a [unfilled] post op visit [FreeTextEntry1] : DOS: 3/27/19 s/p bilateral mastectomy w/ MEEK flaps.

## 2019-04-18 ENCOUNTER — APPOINTMENT (OUTPATIENT)
Age: 59
End: 2019-04-18
Payer: COMMERCIAL

## 2019-04-18 PROCEDURE — 99024 POSTOP FOLLOW-UP VISIT: CPT

## 2019-04-22 ENCOUNTER — OUTPATIENT (OUTPATIENT)
Dept: OUTPATIENT SERVICES | Facility: HOSPITAL | Age: 59
LOS: 1 days | Discharge: HOME | End: 2019-04-22

## 2019-04-22 DIAGNOSIS — Z98.890 OTHER SPECIFIED POSTPROCEDURAL STATES: Chronic | ICD-10-CM

## 2019-04-22 DIAGNOSIS — C50.919 MALIGNANT NEOPLASM OF UNSPECIFIED SITE OF UNSPECIFIED FEMALE BREAST: ICD-10-CM

## 2019-04-22 DIAGNOSIS — Z90.49 ACQUIRED ABSENCE OF OTHER SPECIFIED PARTS OF DIGESTIVE TRACT: Chronic | ICD-10-CM

## 2019-04-22 DIAGNOSIS — M12.9 ARTHROPATHY, UNSPECIFIED: Chronic | ICD-10-CM

## 2019-04-24 ENCOUNTER — APPOINTMENT (OUTPATIENT)
Dept: PLASTIC SURGERY | Facility: CLINIC | Age: 59
End: 2019-04-24
Payer: COMMERCIAL

## 2019-04-24 PROCEDURE — 99024 POSTOP FOLLOW-UP VISIT: CPT

## 2019-04-26 ENCOUNTER — APPOINTMENT (OUTPATIENT)
Dept: HEMATOLOGY ONCOLOGY | Facility: CLINIC | Age: 59
End: 2019-04-26
Payer: COMMERCIAL

## 2019-04-26 VITALS
RESPIRATION RATE: 18 BRPM | OXYGEN SATURATION: 97 % | DIASTOLIC BLOOD PRESSURE: 89 MMHG | BODY MASS INDEX: 43.54 KG/M2 | TEMPERATURE: 98.6 F | SYSTOLIC BLOOD PRESSURE: 126 MMHG | WEIGHT: 239.64 LBS | HEART RATE: 99 BPM | HEIGHT: 62.01 IN

## 2019-04-26 PROCEDURE — 99245 OFF/OP CONSLTJ NEW/EST HI 55: CPT

## 2019-04-26 NOTE — REASON FOR VISIT
[Follow-Up: _____] : a [unfilled] follow-up visit [FreeTextEntry1] : patient is s/p bilateral breast reconstruction with MEEK flaps 3/27/19.

## 2019-04-28 NOTE — HISTORY OF PRESENT ILLNESS
[Disease: _____________________] : Disease: [unfilled] [T: ___] : T[unfilled] [N: ___] : N[unfilled] [AJCC Stage: ____] : AJCC Stage: [unfilled] [de-identified] : Age 59: left breast cancer \par Screening mammogram on 12/16/2018 showed focal asymmetry in the posterior central lateral left breast and additional imaging showing 7 mm spiculated mass at the left 5:00 area. She had left breast biopsy which showed invasive mammary carcinoma with lobular features ER >90%, LA 24.5%, Oea4duh negative and LCIS. MRI of the breast showed biopsy proven nonmass enhancement along with left breast 4:00 enhancement and 9:00 enhancement. She had MRI guided biopsy of the L 4:00 area that showed mild proliferative fibrocystic changes and 9:00 area that showed LCIS and fibrocystic changes. On 3/27/19, she underwent R prophylactic mastectomy with SLN biopsy and L mastectomy and sentinel lymph node biopsy with Dr Disla. The pathology showed R breast LCIS and L left mastectomy showed invasive lobular carcinoma and extensive LCIS classic type with extensive ductal extension and negative sentinel lymph nodes. OncotypeDx score was 10. She had MEEK flap reconstruction with Dr Mcclain.  [de-identified] : invasive lobular carcinoma ER >90%, AK 24.5%, Tkr4zrx negative  [de-identified] : She is still recovering from her MEEK flap reconstruction and continuing to follow up with Dr Mcclain. She is present to review adjuvant recommendations for her breast cancer. She has baseline hip pain that improved with trigger point injections. She is on chronic medications and supplements.

## 2019-04-28 NOTE — REVIEW OF SYSTEMS
[Joint Pain] : joint pain [Joint Stiffness] : no joint stiffness [Muscle Pain] : no muscle pain [Muscle Weakness] : no muscle weakness [Skin Rash] : no skin rash [Skin Wound] : skin wound [Negative] : Allergic/Immunologic [FreeTextEntry9] : hip pain  [de-identified] : MEEK flap wound

## 2019-04-28 NOTE — ASSESSMENT
[FreeTextEntry1] : She is a 60 y/o  F with Stage IA left breast cancer s/p bilateral mastectomy with sentinel lymph node biopsy. We reviewed the significance of ER positive, LN negative breast cancer and the risk of breast cancer recurrence. We reviewed the role of adjuvant therapy to reduce the risk of breast cancer recurrence. We reviewed the use of OncotypeDx to evaluate the benefit of adjuvant chemotherapy. With a low risk OncotypeDx score of 10, we explained the risks of chemotherapy may outweigh any benefits. \par \par We recommended endocrine therapy. We reviewed the different endocrine options. We reviewed anastrozole one tablet a day for up to 5 to 10 years. We reviewed the potential side effects of endocrine therapy including but not limited to: hot flash, GI upset, bone stiffness/ arthralgias, fatigue, and decrease in bone density. We reviewed supportive measures to decrease these side effects. We reviewed bone health with calcium and Vitamin D supplementation. Written information on anastrozole was provided to her. Questions were answered to her satisfaction. She understands if she experiences any intolerable side effects, we could switch endocrine therapies. She consented to anastrozole. Her next follow up will be in 3 months.\par

## 2019-04-28 NOTE — CONSULT LETTER
[Dear  ___] : Dear  [unfilled], [Consult Letter:] : I had the pleasure of evaluating your patient, [unfilled]. [( Thank you for referring [unfilled] for consultation for _____ )] : Thank you for referring [unfilled] for consultation for [unfilled] [Please see my note below.] : Please see my note below. [Consult Closing:] : Thank you very much for allowing me to participate in the care of this patient.  If you have any questions, please do not hesitate to contact me. [Sincerely,] : Sincerely, [FreeTextEntry2] : Davy Disla MD\par [address] [place] [address]\[place] [address] [FreeTextEntry3] : Mello Parr MD\par Attending\par [facility]\par  [DrBruno  ___] : Dr. NICKERSON

## 2019-04-28 NOTE — REASON FOR VISIT
[Initial Consultation] : an initial consultation [Friend] : friend [FreeTextEntry2] : evaluation of left breast cancer

## 2019-04-28 NOTE — PHYSICAL EXAM
[Fully active, able to carry on all pre-disease performance without restriction] : Status 0 - Fully active, able to carry on all pre-disease performance without restriction [Normal] : affect appropriate [de-identified] : bilateral mastectomy with reconstruction: dressing over MEEK flap sites  [de-identified] : abdominal site healing

## 2019-04-29 ENCOUNTER — APPOINTMENT (OUTPATIENT)
Dept: PLASTIC SURGERY | Facility: CLINIC | Age: 59
End: 2019-04-29
Payer: COMMERCIAL

## 2019-04-29 PROCEDURE — 99024 POSTOP FOLLOW-UP VISIT: CPT

## 2019-04-29 NOTE — REASON FOR VISIT
[Post Op: _________] : a [unfilled] post op visit [FreeTextEntry1] : s/p bilateral breast reconstruction with MEEK flaps 3/27/19.

## 2019-05-02 ENCOUNTER — APPOINTMENT (OUTPATIENT)
Dept: PLASTIC SURGERY | Facility: CLINIC | Age: 59
End: 2019-05-02

## 2019-05-08 ENCOUNTER — OUTPATIENT (OUTPATIENT)
Dept: OUTPATIENT SERVICES | Facility: HOSPITAL | Age: 59
LOS: 1 days | End: 2019-05-08

## 2019-05-08 ENCOUNTER — APPOINTMENT (OUTPATIENT)
Dept: PLASTIC SURGERY | Facility: CLINIC | Age: 59
End: 2019-05-08
Payer: COMMERCIAL

## 2019-05-08 VITALS
DIASTOLIC BLOOD PRESSURE: 78 MMHG | HEART RATE: 100 BPM | OXYGEN SATURATION: 98 % | HEIGHT: 62 IN | RESPIRATION RATE: 16 BRPM | SYSTOLIC BLOOD PRESSURE: 130 MMHG | TEMPERATURE: 99 F | WEIGHT: 242.07 LBS

## 2019-05-08 DIAGNOSIS — F41.9 ANXIETY DISORDER, UNSPECIFIED: ICD-10-CM

## 2019-05-08 DIAGNOSIS — Z42.1 ENCOUNTER FOR BREAST RECONSTRUCTION FOLLOWING MASTECTOMY: ICD-10-CM

## 2019-05-08 DIAGNOSIS — Z90.13 ACQUIRED ABSENCE OF BILATERAL BREASTS AND NIPPLES: Chronic | ICD-10-CM

## 2019-05-08 DIAGNOSIS — Z90.49 ACQUIRED ABSENCE OF OTHER SPECIFIED PARTS OF DIGESTIVE TRACT: Chronic | ICD-10-CM

## 2019-05-08 DIAGNOSIS — Z98.890 OTHER SPECIFIED POSTPROCEDURAL STATES: Chronic | ICD-10-CM

## 2019-05-08 DIAGNOSIS — G47.33 OBSTRUCTIVE SLEEP APNEA (ADULT) (PEDIATRIC): ICD-10-CM

## 2019-05-08 DIAGNOSIS — M12.9 ARTHROPATHY, UNSPECIFIED: Chronic | ICD-10-CM

## 2019-05-08 DIAGNOSIS — J45.909 UNSPECIFIED ASTHMA, UNCOMPLICATED: ICD-10-CM

## 2019-05-08 LAB
ANION GAP SERPL CALC-SCNC: 12 MMO/L — SIGNIFICANT CHANGE UP (ref 7–14)
BUN SERPL-MCNC: 18 MG/DL — SIGNIFICANT CHANGE UP (ref 7–23)
CALCIUM SERPL-MCNC: 9.7 MG/DL — SIGNIFICANT CHANGE UP (ref 8.4–10.5)
CHLORIDE SERPL-SCNC: 109 MMOL/L — HIGH (ref 98–107)
CO2 SERPL-SCNC: 22 MMOL/L — SIGNIFICANT CHANGE UP (ref 22–31)
CREAT SERPL-MCNC: 1.15 MG/DL — SIGNIFICANT CHANGE UP (ref 0.5–1.3)
GLUCOSE SERPL-MCNC: 56 MG/DL — LOW (ref 70–99)
HCT VFR BLD CALC: 39.2 % — SIGNIFICANT CHANGE UP (ref 34.5–45)
HGB BLD-MCNC: 11.9 G/DL — SIGNIFICANT CHANGE UP (ref 11.5–15.5)
MCHC RBC-ENTMCNC: 25.9 PG — LOW (ref 27–34)
MCHC RBC-ENTMCNC: 30.4 % — LOW (ref 32–36)
MCV RBC AUTO: 85.4 FL — SIGNIFICANT CHANGE UP (ref 80–100)
NRBC # FLD: 0 K/UL — SIGNIFICANT CHANGE UP (ref 0–0)
PLATELET # BLD AUTO: 473 K/UL — HIGH (ref 150–400)
PMV BLD: 10 FL — SIGNIFICANT CHANGE UP (ref 7–13)
POTASSIUM SERPL-MCNC: 3.9 MMOL/L — SIGNIFICANT CHANGE UP (ref 3.5–5.3)
POTASSIUM SERPL-SCNC: 3.9 MMOL/L — SIGNIFICANT CHANGE UP (ref 3.5–5.3)
RBC # BLD: 4.59 M/UL — SIGNIFICANT CHANGE UP (ref 3.8–5.2)
RBC # FLD: 14.1 % — SIGNIFICANT CHANGE UP (ref 10.3–14.5)
SODIUM SERPL-SCNC: 143 MMOL/L — SIGNIFICANT CHANGE UP (ref 135–145)
WBC # BLD: 9.07 K/UL — SIGNIFICANT CHANGE UP (ref 3.8–10.5)
WBC # FLD AUTO: 9.07 K/UL — SIGNIFICANT CHANGE UP (ref 3.8–10.5)

## 2019-05-08 PROCEDURE — 99024 POSTOP FOLLOW-UP VISIT: CPT

## 2019-05-08 RX ORDER — OXYCODONE HYDROCHLORIDE 5 MG/1
1 TABLET ORAL
Qty: 0 | Refills: 0 | COMMUNITY

## 2019-05-08 RX ORDER — AZTREONAM 2 G
1 VIAL (EA) INJECTION
Qty: 0 | Refills: 0 | COMMUNITY

## 2019-05-08 NOTE — H&P PST ADULT - NSICDXPROBLEM_GEN_ALL_CORE_FT
PROBLEM DIAGNOSES  Problem: Encounter for breast reconstruction following mastectomy  Assessment and Plan: Scheduled for Revision of Bilateral Breast Reconstruction and Abdominal Donor Site on 5/15/2019.   Preop instructions given, pt verbalized understanding   GI prophylaxis provided     Problem: Anxiety and depression  Assessment and Plan: Pt to take Bupropion and Pristiq on day of surgery with sips of water     Problem: Asthma  Assessment and Plan: Pt to use Breo Ellipta inhaler on day of surgery     Problem: ELI on CPAP  Assessment and Plan: Confirmed h/o ELI- OR booking notified

## 2019-05-08 NOTE — H&P PST ADULT - RS GEN PE MLT RESP DETAILS PC
respirations non-labored/breath sounds equal/airway patent/clear to auscultation bilaterally/good air movement/no chest wall tenderness

## 2019-05-08 NOTE — H&P PST ADULT - NSICDXPASTMEDICALHX_GEN_ALL_CORE_FT
PAST MEDICAL HISTORY:  Anemia, unspecified type     Anxiety and depression     Breast cancer left side in 2019    Encounter for breast reconstruction following mastectomy     History of gastric ulcer     History of melanoma removed from back.    History of shingles October 2018    Incisional hernia, without obstruction or gangrene     Obesity, unspecified obesity severity, unspecified obesity type     Sleep apnea, obstructive uses device at night    Uncomplicated asthma, unspecified asthma severity

## 2019-05-08 NOTE — H&P PST ADULT - NSICDXPASTSURGICALHX_GEN_ALL_CORE_FT
PAST SURGICAL HISTORY:  Arthropathy right knee meniscus surgery    H/O bilateral mastectomy with MEEK flap 3/2019    H/O gastric bypass 2004    S/P appendectomy 1971    S/P bunionectomy Right x 1 - 1999. left x 2 with hardware- 2001, 2014

## 2019-05-08 NOTE — H&P PST ADULT - --DESCRIBE
Pt reports serosanguinous drainage to left breast. She has daily dressing changes by wound care nurse.

## 2019-05-08 NOTE — H&P PST ADULT - HISTORY OF PRESENT ILLNESS
60 y/o female with ELI on CPAP, Asthma, Depression and Anxiety presents to PST for preoperative evaluation with dx of encounter for breast reconstruction following mastectomy. Pt diagnosed with Left Breast CA and s/p bilateral mastectomy and MEEK flap reconstruction March 2019.  She presents today for further reconstructive surgery. Scheduled for Revision of Bilateral Breast Reconstruction and Abdominal Donor Site on 5/15/2019.

## 2019-05-08 NOTE — H&P PST ADULT - GENERAL COMMENTS
Pt had low grade fever for 4-5 days post surgery. Treated for surgical site infection (abdominal donor site)

## 2019-05-08 NOTE — H&P PST ADULT - NEGATIVE NEUROLOGICAL SYMPTOMS
no weakness/no generalized seizures/no focal seizures/no transient paralysis/no headache/no cva/no vertigo

## 2019-05-08 NOTE — H&P PST ADULT - SKIN/BREAST COMMENTS
h/o left breast cancer s/p mastectomy and MEEK flap. h/o left breast cancer-s/p mastectomy and MEEK flap.

## 2019-05-09 NOTE — REASON FOR VISIT
[Post Op: _________] : a [unfilled] post op visit [FreeTextEntry1] : s/p bilateral breast reconstruction with MEEK flaps 3/27/19. Patient c/o bilateral soreness.\par

## 2019-05-14 ENCOUNTER — TRANSCRIPTION ENCOUNTER (OUTPATIENT)
Age: 59
End: 2019-05-14

## 2019-05-14 PROBLEM — Z42.1 ENCOUNTER FOR BREAST RECONSTRUCTION FOLLOWING MASTECTOMY: Chronic | Status: ACTIVE | Noted: 2019-05-08

## 2019-05-15 ENCOUNTER — OUTPATIENT (OUTPATIENT)
Dept: OUTPATIENT SERVICES | Facility: HOSPITAL | Age: 59
LOS: 1 days | Discharge: HOME | End: 2019-05-15
Payer: COMMERCIAL

## 2019-05-15 VITALS
RESPIRATION RATE: 16 BRPM | HEIGHT: 62 IN | SYSTOLIC BLOOD PRESSURE: 148 MMHG | TEMPERATURE: 98 F | OXYGEN SATURATION: 99 % | DIASTOLIC BLOOD PRESSURE: 85 MMHG | WEIGHT: 242.07 LBS | HEART RATE: 82 BPM

## 2019-05-15 VITALS
OXYGEN SATURATION: 96 % | TEMPERATURE: 98 F | SYSTOLIC BLOOD PRESSURE: 136 MMHG | HEART RATE: 68 BPM | DIASTOLIC BLOOD PRESSURE: 94 MMHG

## 2019-05-15 DIAGNOSIS — Z42.1 ENCOUNTER FOR BREAST RECONSTRUCTION FOLLOWING MASTECTOMY: ICD-10-CM

## 2019-05-15 DIAGNOSIS — Z98.890 OTHER SPECIFIED POSTPROCEDURAL STATES: Chronic | ICD-10-CM

## 2019-05-15 DIAGNOSIS — Z90.13 ACQUIRED ABSENCE OF BILATERAL BREASTS AND NIPPLES: Chronic | ICD-10-CM

## 2019-05-15 DIAGNOSIS — M12.9 ARTHROPATHY, UNSPECIFIED: Chronic | ICD-10-CM

## 2019-05-15 DIAGNOSIS — Z90.49 ACQUIRED ABSENCE OF OTHER SPECIFIED PARTS OF DIGESTIVE TRACT: Chronic | ICD-10-CM

## 2019-05-15 PROCEDURE — 15734 MUSCLE-SKIN GRAFT TRUNK: CPT | Mod: 78

## 2019-05-15 PROCEDURE — 19380 REVJ RECONSTRUCTED BREAST: CPT | Mod: 78,LT

## 2019-05-15 RX ORDER — SODIUM CHLORIDE 9 MG/ML
1000 INJECTION, SOLUTION INTRAVENOUS
Refills: 0 | Status: DISCONTINUED | OUTPATIENT
Start: 2019-05-15 | End: 2019-05-30

## 2019-05-15 NOTE — ASU PREOP CHECKLIST - SURGICAL CONSENT
revision of bilateral breast reconstruction revision of donor site done/revision of bilateral breast reconstruction revision of donor site

## 2019-05-15 NOTE — ASU DISCHARGE PLAN (ADULT/PEDIATRIC) - ASU DC SPECIAL INSTRUCTIONSFT
keep dressings in place until follow up. keep dressings in place until follow up.    DO NOT TAKE ANYTHING CONTAINING TYLENOL UNTIL AFTER 3PM

## 2019-05-15 NOTE — ASU DISCHARGE PLAN (ADULT/PEDIATRIC) - CARE PROVIDER_API CALL
Anmol Mcclain; JOSE RAMON)  Otolaryngology; Plastic Surgery  [address] [place] [address] [address]  [place] [address]  Phone: [phone]  Fax: [phone]  Follow Up Time:

## 2019-05-15 NOTE — ASU DISCHARGE PLAN (ADULT/PEDIATRIC) - FOLLOW UP APPOINTMENTS
[facility] 911 or go to the nearest Emergency Room

## 2019-05-15 NOTE — ASU DISCHARGE PLAN (ADULT/PEDIATRIC) - PAIN MANAGEMENT
Prescriptions electronically submitted to pharmacy from doctor's office patient has RX at home/Prescriptions electronically submitted to pharmacy from doctor's office/Prescription given to patient/guardian

## 2019-05-15 NOTE — ASU PREOP CHECKLIST - VERIFY SURGICAL SITE/SIDE WITH PATIENT
Revision of Bilateral breast reconstruction and revision of donor site Revision of Bilateral breast reconstruction and revision of donor site/done

## 2019-05-18 ENCOUNTER — INPATIENT (INPATIENT)
Facility: HOSPITAL | Age: 59
LOS: 1 days | Discharge: HOME HEALTH | End: 2019-05-20
Attending: PLASTIC SURGERY | Admitting: PLASTIC SURGERY

## 2019-05-18 VITALS
SYSTOLIC BLOOD PRESSURE: 153 MMHG | RESPIRATION RATE: 16 BRPM | TEMPERATURE: 99 F | HEART RATE: 107 BPM | DIASTOLIC BLOOD PRESSURE: 104 MMHG | OXYGEN SATURATION: 100 %

## 2019-05-18 DIAGNOSIS — Z98.890 OTHER SPECIFIED POSTPROCEDURAL STATES: Chronic | ICD-10-CM

## 2019-05-18 DIAGNOSIS — Z90.13 ACQUIRED ABSENCE OF BILATERAL BREASTS AND NIPPLES: Chronic | ICD-10-CM

## 2019-05-18 DIAGNOSIS — N61.1 ABSCESS OF THE BREAST AND NIPPLE: ICD-10-CM

## 2019-05-18 DIAGNOSIS — Z90.49 ACQUIRED ABSENCE OF OTHER SPECIFIED PARTS OF DIGESTIVE TRACT: Chronic | ICD-10-CM

## 2019-05-18 DIAGNOSIS — M12.9 ARTHROPATHY, UNSPECIFIED: Chronic | ICD-10-CM

## 2019-05-18 LAB
ALBUMIN SERPL ELPH-MCNC: 3.8 G/DL — SIGNIFICANT CHANGE UP (ref 3.3–5)
ALP SERPL-CCNC: 134 U/L — HIGH (ref 40–120)
ALT FLD-CCNC: 23 U/L — SIGNIFICANT CHANGE UP (ref 4–33)
ANION GAP SERPL CALC-SCNC: 13 MMO/L — SIGNIFICANT CHANGE UP (ref 7–14)
APTT BLD: 28.4 SEC — SIGNIFICANT CHANGE UP (ref 27.5–36.3)
AST SERPL-CCNC: 16 U/L — SIGNIFICANT CHANGE UP (ref 4–32)
BASE EXCESS BLDV CALC-SCNC: 3 MMOL/L — SIGNIFICANT CHANGE UP
BASOPHILS # BLD AUTO: 0.03 K/UL — SIGNIFICANT CHANGE UP (ref 0–0.2)
BASOPHILS NFR BLD AUTO: 0.3 % — SIGNIFICANT CHANGE UP (ref 0–2)
BILIRUB SERPL-MCNC: 0.4 MG/DL — SIGNIFICANT CHANGE UP (ref 0.2–1.2)
BLD GP AB SCN SERPL QL: NEGATIVE — SIGNIFICANT CHANGE UP
BLOOD GAS VENOUS - CREATININE: 0.91 MG/DL — SIGNIFICANT CHANGE UP (ref 0.5–1.3)
BUN SERPL-MCNC: 7 MG/DL — SIGNIFICANT CHANGE UP (ref 7–23)
CALCIUM SERPL-MCNC: 9.5 MG/DL — SIGNIFICANT CHANGE UP (ref 8.4–10.5)
CHLORIDE BLDV-SCNC: 104 MMOL/L — SIGNIFICANT CHANGE UP (ref 96–108)
CHLORIDE SERPL-SCNC: 102 MMOL/L — SIGNIFICANT CHANGE UP (ref 98–107)
CO2 SERPL-SCNC: 25 MMOL/L — SIGNIFICANT CHANGE UP (ref 22–31)
CREAT SERPL-MCNC: 0.88 MG/DL — SIGNIFICANT CHANGE UP (ref 0.5–1.3)
EOSINOPHIL # BLD AUTO: 0.05 K/UL — SIGNIFICANT CHANGE UP (ref 0–0.5)
EOSINOPHIL NFR BLD AUTO: 0.4 % — SIGNIFICANT CHANGE UP (ref 0–6)
GAS PNL BLDV: 139 MMOL/L — SIGNIFICANT CHANGE UP (ref 136–146)
GLUCOSE BLDV-MCNC: 105 MG/DL — HIGH (ref 70–99)
GLUCOSE SERPL-MCNC: 104 MG/DL — HIGH (ref 70–99)
GRAM STN WND: SIGNIFICANT CHANGE UP
HCO3 BLDV-SCNC: 25 MMOL/L — SIGNIFICANT CHANGE UP (ref 20–27)
HCT VFR BLD CALC: 36 % — SIGNIFICANT CHANGE UP (ref 34.5–45)
HCT VFR BLDV CALC: 34.6 % — SIGNIFICANT CHANGE UP (ref 34.5–45)
HGB BLD-MCNC: 11.4 G/DL — LOW (ref 11.5–15.5)
HGB BLDV-MCNC: 11.2 G/DL — LOW (ref 11.5–15.5)
IMM GRANULOCYTES NFR BLD AUTO: 0.5 % — SIGNIFICANT CHANGE UP (ref 0–1.5)
INR BLD: 1.1 — SIGNIFICANT CHANGE UP (ref 0.88–1.17)
LACTATE BLDV-MCNC: 1.4 MMOL/L — SIGNIFICANT CHANGE UP (ref 0.5–2)
LYMPHOCYTES # BLD AUTO: 19.1 % — SIGNIFICANT CHANGE UP (ref 13–44)
LYMPHOCYTES # BLD AUTO: 2.26 K/UL — SIGNIFICANT CHANGE UP (ref 1–3.3)
MCHC RBC-ENTMCNC: 26.1 PG — LOW (ref 27–34)
MCHC RBC-ENTMCNC: 31.7 % — LOW (ref 32–36)
MCV RBC AUTO: 82.4 FL — SIGNIFICANT CHANGE UP (ref 80–100)
MONOCYTES # BLD AUTO: 1.06 K/UL — HIGH (ref 0–0.9)
MONOCYTES NFR BLD AUTO: 8.9 % — SIGNIFICANT CHANGE UP (ref 2–14)
NEUTROPHILS # BLD AUTO: 8.39 K/UL — HIGH (ref 1.8–7.4)
NEUTROPHILS NFR BLD AUTO: 70.8 % — SIGNIFICANT CHANGE UP (ref 43–77)
NRBC # FLD: 0 K/UL — SIGNIFICANT CHANGE UP (ref 0–0)
PCO2 BLDV: 47 MMHG — SIGNIFICANT CHANGE UP (ref 41–51)
PH BLDV: 7.39 PH — SIGNIFICANT CHANGE UP (ref 7.32–7.43)
PLATELET # BLD AUTO: 443 K/UL — HIGH (ref 150–400)
PMV BLD: 11.3 FL — SIGNIFICANT CHANGE UP (ref 7–13)
PO2 BLDV: < 24 MMHG — LOW (ref 35–40)
POTASSIUM BLDV-SCNC: 3.5 MMOL/L — SIGNIFICANT CHANGE UP (ref 3.4–4.5)
POTASSIUM SERPL-MCNC: 3.6 MMOL/L — SIGNIFICANT CHANGE UP (ref 3.5–5.3)
POTASSIUM SERPL-SCNC: 3.6 MMOL/L — SIGNIFICANT CHANGE UP (ref 3.5–5.3)
PROT SERPL-MCNC: 7.6 G/DL — SIGNIFICANT CHANGE UP (ref 6–8.3)
PROTHROM AB SERPL-ACNC: 12.6 SEC — SIGNIFICANT CHANGE UP (ref 9.8–13.1)
RBC # BLD: 4.37 M/UL — SIGNIFICANT CHANGE UP (ref 3.8–5.2)
RBC # FLD: 14.7 % — HIGH (ref 10.3–14.5)
RH IG SCN BLD-IMP: POSITIVE — SIGNIFICANT CHANGE UP
SAO2 % BLDV: 28.9 % — LOW (ref 60–85)
SODIUM SERPL-SCNC: 140 MMOL/L — SIGNIFICANT CHANGE UP (ref 135–145)
SPECIMEN SOURCE: SIGNIFICANT CHANGE UP
WBC # BLD: 11.85 K/UL — HIGH (ref 3.8–10.5)
WBC # FLD AUTO: 11.85 K/UL — HIGH (ref 3.8–10.5)

## 2019-05-18 RX ORDER — ACETAMINOPHEN 500 MG
650 TABLET ORAL ONCE
Refills: 0 | Status: DISCONTINUED | OUTPATIENT
Start: 2019-05-18 | End: 2019-05-18

## 2019-05-18 RX ORDER — SODIUM HYPOCHLORITE 0.125 %
1 SOLUTION, NON-ORAL MISCELLANEOUS THREE TIMES A DAY
Refills: 0 | Status: DISCONTINUED | OUTPATIENT
Start: 2019-05-18 | End: 2019-05-20

## 2019-05-18 RX ORDER — BUPROPION HYDROCHLORIDE 150 MG/1
150 TABLET, EXTENDED RELEASE ORAL DAILY
Refills: 0 | Status: DISCONTINUED | OUTPATIENT
Start: 2019-05-18 | End: 2019-05-20

## 2019-05-18 RX ORDER — MONTELUKAST 4 MG/1
10 TABLET, CHEWABLE ORAL DAILY
Refills: 0 | Status: DISCONTINUED | OUTPATIENT
Start: 2019-05-18 | End: 2019-05-20

## 2019-05-18 RX ORDER — PIPERACILLIN AND TAZOBACTAM 4; .5 G/20ML; G/20ML
3.38 INJECTION, POWDER, LYOPHILIZED, FOR SOLUTION INTRAVENOUS EVERY 8 HOURS
Refills: 0 | Status: DISCONTINUED | OUTPATIENT
Start: 2019-05-18 | End: 2019-05-20

## 2019-05-18 RX ORDER — OXYCODONE AND ACETAMINOPHEN 5; 325 MG/1; MG/1
2 TABLET ORAL EVERY 6 HOURS
Refills: 0 | Status: DISCONTINUED | OUTPATIENT
Start: 2019-05-18 | End: 2019-05-20

## 2019-05-18 RX ORDER — SODIUM CHLORIDE 9 MG/ML
1000 INJECTION INTRAMUSCULAR; INTRAVENOUS; SUBCUTANEOUS
Refills: 0 | Status: DISCONTINUED | OUTPATIENT
Start: 2019-05-18 | End: 2019-05-19

## 2019-05-18 RX ORDER — ENOXAPARIN SODIUM 100 MG/ML
40 INJECTION SUBCUTANEOUS DAILY
Refills: 0 | Status: DISCONTINUED | OUTPATIENT
Start: 2019-05-18 | End: 2019-05-20

## 2019-05-18 RX ORDER — ACETAMINOPHEN 500 MG
650 TABLET ORAL EVERY 6 HOURS
Refills: 0 | Status: DISCONTINUED | OUTPATIENT
Start: 2019-05-18 | End: 2019-05-20

## 2019-05-18 RX ORDER — VANCOMYCIN HCL 1 G
1000 VIAL (EA) INTRAVENOUS ONCE
Refills: 0 | Status: COMPLETED | OUTPATIENT
Start: 2019-05-18 | End: 2019-05-18

## 2019-05-18 RX ORDER — ACETAMINOPHEN 500 MG
975 TABLET ORAL ONCE
Refills: 0 | Status: COMPLETED | OUTPATIENT
Start: 2019-05-18 | End: 2019-05-18

## 2019-05-18 RX ORDER — VANCOMYCIN HCL 1 G
1000 VIAL (EA) INTRAVENOUS EVERY 12 HOURS
Refills: 0 | Status: DISCONTINUED | OUTPATIENT
Start: 2019-05-18 | End: 2019-05-20

## 2019-05-18 RX ORDER — DESVENLAFAXINE 50 MG/1
100 TABLET, EXTENDED RELEASE ORAL DAILY
Refills: 0 | Status: DISCONTINUED | OUTPATIENT
Start: 2019-05-18 | End: 2019-05-20

## 2019-05-18 RX ORDER — PIPERACILLIN AND TAZOBACTAM 4; .5 G/20ML; G/20ML
3.38 INJECTION, POWDER, LYOPHILIZED, FOR SOLUTION INTRAVENOUS ONCE
Refills: 0 | Status: COMPLETED | OUTPATIENT
Start: 2019-05-18 | End: 2019-05-18

## 2019-05-18 RX ORDER — FLUTICASONE FUROATE AND VILANTEROL TRIFENATATE 100; 25 UG/1; UG/1
1 POWDER RESPIRATORY (INHALATION) DAILY
Refills: 0 | Status: DISCONTINUED | OUTPATIENT
Start: 2019-05-18 | End: 2019-05-20

## 2019-05-18 RX ORDER — SODIUM CHLORIDE 9 MG/ML
1000 INJECTION, SOLUTION INTRAVENOUS
Refills: 0 | Status: DISCONTINUED | OUTPATIENT
Start: 2019-05-18 | End: 2019-05-18

## 2019-05-18 RX ORDER — SODIUM CHLORIDE 9 MG/ML
2000 INJECTION, SOLUTION INTRAVENOUS ONCE
Refills: 0 | Status: COMPLETED | OUTPATIENT
Start: 2019-05-18 | End: 2019-05-18

## 2019-05-18 RX ORDER — OXYCODONE AND ACETAMINOPHEN 5; 325 MG/1; MG/1
1 TABLET ORAL EVERY 6 HOURS
Refills: 0 | Status: DISCONTINUED | OUTPATIENT
Start: 2019-05-18 | End: 2019-05-20

## 2019-05-18 RX ADMIN — Medication 250 MILLIGRAM(S): at 14:26

## 2019-05-18 RX ADMIN — SODIUM CHLORIDE 125 MILLILITER(S): 9 INJECTION, SOLUTION INTRAVENOUS at 15:51

## 2019-05-18 RX ADMIN — PIPERACILLIN AND TAZOBACTAM 200 GRAM(S): 4; .5 INJECTION, POWDER, LYOPHILIZED, FOR SOLUTION INTRAVENOUS at 13:26

## 2019-05-18 RX ADMIN — OXYCODONE AND ACETAMINOPHEN 2 TABLET(S): 5; 325 TABLET ORAL at 22:23

## 2019-05-18 RX ADMIN — SODIUM CHLORIDE 125 MILLILITER(S): 9 INJECTION, SOLUTION INTRAVENOUS at 21:46

## 2019-05-18 RX ADMIN — Medication 975 MILLIGRAM(S): at 14:25

## 2019-05-18 RX ADMIN — SODIUM CHLORIDE 2000 MILLILITER(S): 9 INJECTION, SOLUTION INTRAVENOUS at 12:55

## 2019-05-18 RX ADMIN — Medication 975 MILLIGRAM(S): at 12:39

## 2019-05-18 RX ADMIN — Medication 1 APPLICATION(S): at 21:46

## 2019-05-18 RX ADMIN — PIPERACILLIN AND TAZOBACTAM 25 GRAM(S): 4; .5 INJECTION, POWDER, LYOPHILIZED, FOR SOLUTION INTRAVENOUS at 21:46

## 2019-05-18 RX ADMIN — ENOXAPARIN SODIUM 40 MILLIGRAM(S): 100 INJECTION SUBCUTANEOUS at 15:52

## 2019-05-18 RX ADMIN — SODIUM CHLORIDE 100 MILLILITER(S): 9 INJECTION INTRAMUSCULAR; INTRAVENOUS; SUBCUTANEOUS at 23:32

## 2019-05-18 RX ADMIN — OXYCODONE AND ACETAMINOPHEN 2 TABLET(S): 5; 325 TABLET ORAL at 21:53

## 2019-05-18 NOTE — ED PROVIDER NOTE - CLINICAL SUMMARY MEDICAL DECISION MAKING FREE TEXT BOX
59 year old female post-op day#3 s/p bilateral breast reconstruction presenting with pain and redness to left breast, likely post-op cellulitis with abscess; meets sepsis criteria; ED sepsis protocol, IV ABX, IVF, and Plastics consult.

## 2019-05-18 NOTE — H&P ADULT - NSHPPHYSICALEXAM_GEN_ALL_CORE
Right breast incision intact minimal erythema, no induration or fluctuance    Left breast significant erythema, palpable fluctuance along lateral breast, significant induration, TTP laterally.  Upon exploration of the wound, copious foul smelling turbid brown fluid expressed.    Abdominal incision intact without significant erythema

## 2019-05-18 NOTE — ED ADULT NURSE NOTE - NSIMPLEMENTINTERV_GEN_ALL_ED
Implemented All Universal Safety Interventions:  [place] to call system. Call bell, personal items and telephone within reach. Instruct patient to call for assistance. Room bathroom lighting operational. Non-slip footwear when patient is off stretcher. Physically safe environment: no spills, clutter or unnecessary equipment. Stretcher in lowest position, wheels locked, appropriate side rails in place.

## 2019-05-18 NOTE — ED ADULT NURSE NOTE - PMH
Anemia, unspecified type    Anxiety and depression    Breast cancer  left side in 2019  Encounter for breast reconstruction following mastectomy    History of gastric ulcer    History of melanoma  removed from back.  History of shingles  October 2018  Incisional hernia, without obstruction or gangrene    Obesity, unspecified obesity severity, unspecified obesity type    Sleep apnea, obstructive  uses device at night  Uncomplicated asthma, unspecified asthma severity

## 2019-05-18 NOTE — ED PROVIDER NOTE - OBJECTIVE STATEMENT
pain and swelling to left breast 59 year old female with PMH of ELI on CPAP, Anxiety/depression, Breast ca (s/p b/l mastectomy) presents to the ED complaining of pain and swelling to left breast since yesterday s/p reconstruction 5/16. Pt reports fevers at home T-max 101F. Pt denies chest pain, dyspnea, nausea, vomiting or any other associated symptoms.

## 2019-05-18 NOTE — H&P ADULT - HISTORY OF PRESENT ILLNESS
59F w/ PMH ELI, Asthma, depression, left breast CA s/p b/l mastectomy with immediate reconstruction via MEEK flaps (3/27/19) and s/p revision surgery 5/15/19, noted feelings of malaise, low grade fever, and left breast redness the evening prior to presentation.  She contacted Dr Mcclain who advised her to present to the ED. In the ED, the left breast wound was opened and ~100cc foul smelling turbid fluid was expressed from the wound and sent for culture.

## 2019-05-18 NOTE — ED PROVIDER NOTE - PHYSICAL EXAMINATION
Breast Examined with Dr. Seay  Left breast: +sutures in place, significant erythema noted with induration and fluctuance noted at around 4pm.   Right breast: +sutures in place, no erythema or tenderness.

## 2019-05-18 NOTE — H&P ADULT - ASSESSMENT
A/P 59F s/p bilateral mastectomy and breast reconstruction with MEEK flaps presenting with infection of left breast  - Admit to Dr Mcclain  - Wound aspirate sent to micro  - Broad spectrum abx until culture speciates; Vancomycin and Zosyn  - Home medications  - Regular diet  - BID packing to left breast with kerlix soaked in Dakins  - IVF hydration  - Analgesia PRN with percocet  - VTE ppx with lovenox and SCDs  - trend wbc    Discussed with patient, Dr Sarahi Wright PGY3

## 2019-05-18 NOTE — H&P ADULT - NSICDXFAMILYHX_GEN_ALL_CORE_FT
FAMILY HISTORY:  Family history of Alzheimer's disease, father  Family history of COPD (chronic obstructive pulmonary disease), father  Family history of diabetes mellitus, father  Family history of heart disease, father

## 2019-05-18 NOTE — H&P ADULT - NSHPLABSRESULTS_GEN_ALL_CORE
CBC (05-18 @ 12:30)                              11.4<L>                         11.85<H>  )----------------(  443<H>     70.8  % Neutrophils, 19.1  % Lymphocytes, ANC: 8.39<H>                              36.0                  BMP (05-18 @ 12:30)             140     |  102     |  7     		Ca++ --      Ca 9.5                ---------------------------------( 104<H>		Mg --                 3.6     |  25      |  0.88  			Ph --        LFTs (05-18 @ 12:30)      TPro 7.6 / Alb 3.8 / TBili 0.4 / DBili -- / AST 16 / ALT 23 / AlkPhos 134<H>    Coags (05-18 @ 12:30)  aPTT 28.4 / INR 1.10 / PT 12.6    ABG (05-18 @ 12:30)      /  /  /  /  / %     Lactate:   1.4    VBG (05-18 @ 12:30)     7.39 / 47 / < 24<L> / 25 / 3.0 / 28.9<L>%

## 2019-05-18 NOTE — ED ADULT NURSE NOTE - OBJECTIVE STATEMENT
RN Facilitator: PT received into room 7 A&Ox4, ambulatory C/O fevers, headache, body aches x 2 days. PT states hads PMH:  Left Breast CA and s/p bilateral mastectomy and MEEK flap reconstruction March 2019; Had revision of Bilateral Breast Reconstruction and Abdominal Donor Site on 5/15/2019. Pt presents to with increased swelling to L breast. Bilateral breast surgical incision sites approximated by sutures, appear CDI: erythema and swelling to left breast. Plastics MD at bedside for drainage. MD evaluated, VS as noted. 20 G IV placed to R AC, labs sent, cultures sent. Medicated as ordered. Family at bedside, comfort measures provided, call bell within reach. Report given to primary area RN. RN Facilitator: PT received into room 7 A&Ox4, ambulatory C/O fevers, headache, body aches x 2 days. PT states hads PMH:  Left Breast CA and s/p bilateral mastectomy and MEEK flap reconstruction March 2019; Had revision of Bilateral Breast Reconstruction and Abdominal Donor Site on 5/15/2019. Pt presents to with increased swelling to L breast. Bilateral breast surgical incision sites approximated by sutures, appear CDI: erythema and swelling to left breast. PT has lower ABd surgical ABd incision approximated by sutures appears CDI; has LLQ ABd surgical incision approximated appears CDI. Plastics MD at bedside for drainage of L breast Abscess. MD evaluated, VS as noted. 20 G IV placed to R AC, labs sent, cultures sent. Medicated as ordered. Family at bedside, comfort measures provided, call bell within reach. Report given to primary area RN.

## 2019-05-18 NOTE — ED PROVIDER NOTE - ATTENDING CONTRIBUTION TO CARE
60 yo female presents to ED for evaluation of left breast pain, warmth, swelling s/p surgery on 5/16. Patient has been having fevers at home.     Gen: no acute distress, well appearing, awake, alert and oriented x 3  Breast: (exam chaperoned by HARINI Tao) - warmth, erythema, induration left breast without fluctuance    MDM  Female with left breast infection s/p surgery - will check labs, imaging, medicate, consult surgery

## 2019-05-18 NOTE — ED ADULT TRIAGE NOTE - CHIEF COMPLAINT QUOTE
pt comes to ED for post op complication pt had surgery for breast CA skin was removed from abdomen and put on both breast. pt states the L breast red no drainage. pt has had fevers at home. pt is afebrile here. pt VSS pt denies any chemo and radiation at this time. pt surgeon name MD Mcclain pt complains of dizziness as well. ekg to be obtained.

## 2019-05-19 ENCOUNTER — TRANSCRIPTION ENCOUNTER (OUTPATIENT)
Age: 59
End: 2019-05-19

## 2019-05-19 LAB
HCT VFR BLD CALC: 32 % — LOW (ref 34.5–45)
HGB BLD-MCNC: 10 G/DL — LOW (ref 11.5–15.5)
MCHC RBC-ENTMCNC: 25.8 PG — LOW (ref 27–34)
MCHC RBC-ENTMCNC: 31.3 % — LOW (ref 32–36)
MCV RBC AUTO: 82.5 FL — SIGNIFICANT CHANGE UP (ref 80–100)
NRBC # FLD: 0 K/UL — SIGNIFICANT CHANGE UP (ref 0–0)
PLATELET # BLD AUTO: 348 K/UL — SIGNIFICANT CHANGE UP (ref 150–400)
PMV BLD: 9.5 FL — SIGNIFICANT CHANGE UP (ref 7–13)
RBC # BLD: 3.88 M/UL — SIGNIFICANT CHANGE UP (ref 3.8–5.2)
RBC # FLD: 14.4 % — SIGNIFICANT CHANGE UP (ref 10.3–14.5)
SPECIMEN SOURCE: SIGNIFICANT CHANGE UP
SPECIMEN SOURCE: SIGNIFICANT CHANGE UP
WBC # BLD: 8.37 K/UL — SIGNIFICANT CHANGE UP (ref 3.8–10.5)
WBC # FLD AUTO: 8.37 K/UL — SIGNIFICANT CHANGE UP (ref 3.8–10.5)

## 2019-05-19 RX ORDER — ALPRAZOLAM 0.25 MG
0.5 TABLET ORAL AT BEDTIME
Refills: 0 | Status: DISCONTINUED | OUTPATIENT
Start: 2019-05-19 | End: 2019-05-20

## 2019-05-19 RX ORDER — LANOLIN ALCOHOL/MO/W.PET/CERES
3 CREAM (GRAM) TOPICAL AT BEDTIME
Refills: 0 | Status: DISCONTINUED | OUTPATIENT
Start: 2019-05-19 | End: 2019-05-20

## 2019-05-19 RX ADMIN — OXYCODONE AND ACETAMINOPHEN 2 TABLET(S): 5; 325 TABLET ORAL at 15:33

## 2019-05-19 RX ADMIN — OXYCODONE AND ACETAMINOPHEN 2 TABLET(S): 5; 325 TABLET ORAL at 22:30

## 2019-05-19 RX ADMIN — FLUTICASONE FUROATE AND VILANTEROL TRIFENATATE 1 PUFF(S): 100; 25 POWDER RESPIRATORY (INHALATION) at 12:44

## 2019-05-19 RX ADMIN — PIPERACILLIN AND TAZOBACTAM 25 GRAM(S): 4; .5 INJECTION, POWDER, LYOPHILIZED, FOR SOLUTION INTRAVENOUS at 06:16

## 2019-05-19 RX ADMIN — OXYCODONE AND ACETAMINOPHEN 1 TABLET(S): 5; 325 TABLET ORAL at 09:29

## 2019-05-19 RX ADMIN — PIPERACILLIN AND TAZOBACTAM 25 GRAM(S): 4; .5 INJECTION, POWDER, LYOPHILIZED, FOR SOLUTION INTRAVENOUS at 21:36

## 2019-05-19 RX ADMIN — OXYCODONE AND ACETAMINOPHEN 2 TABLET(S): 5; 325 TABLET ORAL at 16:30

## 2019-05-19 RX ADMIN — Medication 1 APPLICATION(S): at 10:00

## 2019-05-19 RX ADMIN — OXYCODONE AND ACETAMINOPHEN 2 TABLET(S): 5; 325 TABLET ORAL at 21:36

## 2019-05-19 RX ADMIN — Medication 0.5 MILLIGRAM(S): at 22:54

## 2019-05-19 RX ADMIN — DESVENLAFAXINE 100 MILLIGRAM(S): 50 TABLET, EXTENDED RELEASE ORAL at 12:46

## 2019-05-19 RX ADMIN — BUPROPION HYDROCHLORIDE 150 MILLIGRAM(S): 150 TABLET, EXTENDED RELEASE ORAL at 12:45

## 2019-05-19 RX ADMIN — PIPERACILLIN AND TAZOBACTAM 25 GRAM(S): 4; .5 INJECTION, POWDER, LYOPHILIZED, FOR SOLUTION INTRAVENOUS at 13:17

## 2019-05-19 RX ADMIN — Medication 1 APPLICATION(S): at 22:48

## 2019-05-19 RX ADMIN — Medication 250 MILLIGRAM(S): at 02:04

## 2019-05-19 RX ADMIN — OXYCODONE AND ACETAMINOPHEN 1 TABLET(S): 5; 325 TABLET ORAL at 10:29

## 2019-05-19 RX ADMIN — Medication 3 MILLIGRAM(S): at 02:04

## 2019-05-19 RX ADMIN — ENOXAPARIN SODIUM 40 MILLIGRAM(S): 100 INJECTION SUBCUTANEOUS at 15:34

## 2019-05-19 RX ADMIN — Medication 250 MILLIGRAM(S): at 13:17

## 2019-05-19 RX ADMIN — MONTELUKAST 10 MILLIGRAM(S): 4 TABLET, CHEWABLE ORAL at 12:46

## 2019-05-19 NOTE — DISCHARGE NOTE PROVIDER - HOSPITAL COURSE
59F w/ PMH ELI, Asthma, depression, left breast CA s/p b/l mastectomy with immediate reconstruction via MEEK flaps (3/27/19) and s/p revision surgery 5/15/19, noted feelings of malaise, low grade fever, and left breast redness the evening prior to presentation.  She contacted Dr Mcclain who advised her to present to the ED. In the ED, the left breast wound was opened and ~100cc foul smelling turbid fluid was expressed from the wound and sent for culture. Patient was admitted and received IV abx and TID dressing changes. At the time of discharge, the patient was hemodynamically stable, was tolerating PO diet, was ambulating, and was comfortable with adequate pain control and discharged with PO abx. 59F w/ PMH ELI, Asthma, depression, left breast CA s/p b/l mastectomy with immediate reconstruction via MEEK flaps (3/27/19) and s/p revision surgery 5/15/19, noted feelings of malaise, low grade fever, and left breast redness the evening prior to presentation.  She contacted Dr Mcclain who advised her to present to the ED. In the ED, the left breast wound was opened and ~100cc foul smelling turbid fluid was expressed from the wound and sent for culture. Patient was admitted and received IV abx and TID dressing changes. At the time of discharge, the patient was hemodynamically stable, was tolerating PO diet, was ambulating, and was comfortable with adequate pain control and discharged with PO abx and VNS for home dressing changes

## 2019-05-19 NOTE — DISCHARGE NOTE PROVIDER - CARE PROVIDER_API CALL
Anmol Mcclain; JOSE RAMON)  Otolaryngology; Plastic Surgery  [address] [place] [address] [address]  [place] [address]  Phone: [phone]  Fax: [phone]  Follow Up Time:

## 2019-05-19 NOTE — PROGRESS NOTE ADULT - SUBJECTIVE AND OBJECTIVE BOX
Plastic Surgery Progress Note (pg [facility]: [id number], [facility]: [phone])    SUBJECTIVE:  Patient seen and examined at bedside this AM. No acute events overnight. AF/VSS. Pain well controlled.     OBJECTIVE:     ** VITAL SIGNS / I&O's **    Vital Signs Last 24 Hrs  T(C): 36.6 (19 May 2019 06:13), Max: 37.3 (18 May 2019 11:38)  T(F): 97.8 (19 May 2019 06:13), Max: 99.2 (18 May 2019 12:58)  HR: 76 (19 May 2019 06:13) (76 - 107)  BP: 140/68 (19 May 2019 06:13) (139/68 - 153/104)  BP(mean): --  RR: 18 (19 May 2019 06:13) (16 - 18)  SpO2: 100% (19 May 2019 06:13) (96% - 100%)      18 May 2019 07:01  -  19 May 2019 07:00  --------------------------------------------------------  IN:    lactated ringers.: 875 mL    sodium chloride 0.9%.: 800 mL  Total IN: 1675 mL    OUT:    Voided: 2100 mL  Total OUT: 2100 mL    Total NET: -425 mL          ** PHYSICAL EXAM **    -- CONSTITUTIONAL: NAD.   -- CHEST: Right breast incision c/d/i w/ suture and prineo, minimal drainage. Left breast decreased erythema, opened lateral area with some purulent material, repacked wound  -- ABDOMEN: incision c/d/i w/o collection         **MEDS**  acetaminophen   Tablet .. 650 milliGRAM(s) Oral every 6 hours PRN  buPROPion XL . 150 milliGRAM(s) Oral daily  Dakins Solution - 1/4 Strength 1 Application(s) Topical three times a day  desvenlafaxine  milliGRAM(s) Oral daily  enoxaparin Injectable 40 milliGRAM(s) SubCutaneous daily  fluticasone furoate/vilanterol 200-25 MICROgram(s) Inhaler 1 Puff(s) Inhalation daily  melatonin 3 milliGRAM(s) Oral at bedtime PRN  montelukast 10 milliGRAM(s) Oral daily  oxyCODONE    5 mG/acetaminophen 325 mG 1 Tablet(s) Oral every 6 hours PRN  oxyCODONE    5 mG/acetaminophen 325 mG 2 Tablet(s) Oral every 6 hours PRN  piperacillin/tazobactam IVPB. 3.375 Gram(s) IV Intermittent every 8 hours  sodium chloride 0.9%. 1000 milliLiter(s) IV Continuous <Continuous>  vancomycin  IVPB 1000 milliGRAM(s) IV Intermittent every 12 hours      ** LABS **                          10.0   8.37  )-----------( 348      ( 19 May 2019 05:20 )             32.0     18 May 2019 12:30    140    |  102    |  7      ----------------------------<  104    3.6     |  25     |  0.88     Ca    9.5        18 May 2019 12:30    TPro  7.6    /  Alb  3.8    /  TBili  0.4    /  DBili  x      /  AST  16     /  ALT  23     /  AlkPhos  134    18 May 2019 12:30    PT/INR - ( 18 May 2019 12:30 )   PT: 12.6 SEC;   INR: 1.10          PTT - ( 18 May 2019 12:30 )  PTT:28.4 SEC  CAPILLARY BLOOD GLUCOSE

## 2019-05-19 NOTE — PROGRESS NOTE ADULT - ASSESSMENT
A/P 59F s/p bilateral mastectomy and breast reconstruction with MEEK flaps presenting with infection of left breast s/p bedside I&D      - Wound aspirate sent to micro  - Broad spectrum abx until culture speciates; Vancomycin and Zosyn  - Regular diet  - BID packing to left breast with kerlix soaked in Dakins  - IVF hydration  - Analgesia PRN with percocet  - VTE ppx with lovenox and SCDs  - trend wbc

## 2019-05-19 NOTE — DISCHARGE NOTE PROVIDER - NSDCFUADDINST_GEN_ALL_CORE_FT
WOUND CARE:  Right breast: Pack left breast with dakin's soaked kerlix   Umbilicus: Pack umbilicus with dakins soaked 2x2 gauze

## 2019-05-20 ENCOUNTER — TRANSCRIPTION ENCOUNTER (OUTPATIENT)
Age: 59
End: 2019-05-20

## 2019-05-20 VITALS
OXYGEN SATURATION: 100 % | SYSTOLIC BLOOD PRESSURE: 130 MMHG | HEART RATE: 90 BPM | DIASTOLIC BLOOD PRESSURE: 74 MMHG | RESPIRATION RATE: 18 BRPM | TEMPERATURE: 98 F

## 2019-05-20 LAB
GRAM STN WND: SIGNIFICANT CHANGE UP
METHOD TYPE: SIGNIFICANT CHANGE UP
ORGANISM # SPEC MICROSCOPIC CNT: SIGNIFICANT CHANGE UP
VANCOMYCIN TROUGH SERPL-MCNC: 9.8 UG/ML — LOW (ref 10–20)

## 2019-05-20 RX ORDER — ACETAMINOPHEN 500 MG
2 TABLET ORAL
Qty: 0 | Refills: 0 | DISCHARGE

## 2019-05-20 RX ORDER — CIPROFLOXACIN LACTATE 400MG/40ML
1 VIAL (ML) INTRAVENOUS
Qty: 14 | Refills: 0
Start: 2019-05-20 | End: 2019-05-26

## 2019-05-20 RX ORDER — VANCOMYCIN HCL 1 G
1250 VIAL (EA) INTRAVENOUS EVERY 12 HOURS
Refills: 0 | Status: DISCONTINUED | OUTPATIENT
Start: 2019-05-20 | End: 2019-05-20

## 2019-05-20 RX ADMIN — PIPERACILLIN AND TAZOBACTAM 25 GRAM(S): 4; .5 INJECTION, POWDER, LYOPHILIZED, FOR SOLUTION INTRAVENOUS at 13:04

## 2019-05-20 RX ADMIN — MONTELUKAST 10 MILLIGRAM(S): 4 TABLET, CHEWABLE ORAL at 13:12

## 2019-05-20 RX ADMIN — OXYCODONE AND ACETAMINOPHEN 2 TABLET(S): 5; 325 TABLET ORAL at 06:20

## 2019-05-20 RX ADMIN — OXYCODONE AND ACETAMINOPHEN 2 TABLET(S): 5; 325 TABLET ORAL at 13:10

## 2019-05-20 RX ADMIN — FLUTICASONE FUROATE AND VILANTEROL TRIFENATATE 1 PUFF(S): 100; 25 POWDER RESPIRATORY (INHALATION) at 09:55

## 2019-05-20 RX ADMIN — Medication 166.67 MILLIGRAM(S): at 02:50

## 2019-05-20 RX ADMIN — OXYCODONE AND ACETAMINOPHEN 2 TABLET(S): 5; 325 TABLET ORAL at 05:20

## 2019-05-20 RX ADMIN — BUPROPION HYDROCHLORIDE 150 MILLIGRAM(S): 150 TABLET, EXTENDED RELEASE ORAL at 13:04

## 2019-05-20 RX ADMIN — PIPERACILLIN AND TAZOBACTAM 25 GRAM(S): 4; .5 INJECTION, POWDER, LYOPHILIZED, FOR SOLUTION INTRAVENOUS at 05:19

## 2019-05-20 RX ADMIN — DESVENLAFAXINE 100 MILLIGRAM(S): 50 TABLET, EXTENDED RELEASE ORAL at 13:05

## 2019-05-20 RX ADMIN — Medication 1 APPLICATION(S): at 06:46

## 2019-05-20 NOTE — DISCHARGE NOTE NURSING/CASE MANAGEMENT/SOCIAL WORK - NSDCPNINST_GEN_ALL_CORE
pt ambulating, eating, voiding without difficulty. iv discontinued. no distress noted. right breast incision intact and dry with sutures and steris intact, right breast dressing changed prior to discharge, umbilical incision has dressing in place, and lower transverse abdomen intact and dry without sign of infection. patient given supplies.

## 2019-05-20 NOTE — PROGRESS NOTE ADULT - SUBJECTIVE AND OBJECTIVE BOX
Plastic Surgery Progress Note (pg [facility]: [id number], [facility]: [phone])    SUBJECTIVE:  Patient seen and examined at bedside this AM. No acute events overnight. AF/VSS. Pain well controlled. Continues to feel better. Left breast packing changed. Tolerated well.     OBJECTIVE:     ** VITAL SIGNS / I&O's **    Vital Signs Last 24 Hrs  T(C): 36.7 (20 May 2019 05:20), Max: 36.9 (19 May 2019 17:40)  T(F): 98 (20 May 2019 05:20), Max: 98.5 (19 May 2019 17:40)  HR: 74 (20 May 2019 05:20) (70 - 85)  BP: 131/77 (20 May 2019 05:20) (125/62 - 137/84)  BP(mean): --  RR: 16 (20 May 2019 05:20) (16 - 18)  SpO2: 99% (20 May 2019 05:20) (97% - 100%)      19 May 2019 07:01  -  20 May 2019 07:00  --------------------------------------------------------  IN:    IV PiggyBack: 750 mL    Oral Fluid: 1000 mL    sodium chloride 0.9%: 300 mL  Total IN: 2050 mL    OUT:    Voided: 700 mL  Total OUT: 700 mL    Total NET: 1350 mL        ** PHYSICAL EXAM **    -- CONSTITUTIONAL: NAD.   -- CHEST: Right breast incision c/d/i w/ suture and prineo, minimal drainage. Left breast decreased erythema, opened lateral area with some purulent material, repacked wound  -- ABDOMEN: incision c/d/i w/o collection     **MEDS**  acetaminophen   Tablet .. 650 milliGRAM(s) Oral every 6 hours PRN  ALPRAZolam 0.5 milliGRAM(s) Oral at bedtime PRN  buPROPion XL . 150 milliGRAM(s) Oral daily  Dakins Solution - 1/4 Strength 1 Application(s) Topical three times a day  desvenlafaxine  milliGRAM(s) Oral daily  enoxaparin Injectable 40 milliGRAM(s) SubCutaneous daily  fluticasone furoate/vilanterol 200-25 MICROgram(s) Inhaler 1 Puff(s) Inhalation daily  melatonin 3 milliGRAM(s) Oral at bedtime PRN  montelukast 10 milliGRAM(s) Oral daily  oxyCODONE    5 mG/acetaminophen 325 mG 1 Tablet(s) Oral every 6 hours PRN  oxyCODONE    5 mG/acetaminophen 325 mG 2 Tablet(s) Oral every 6 hours PRN  piperacillin/tazobactam IVPB. 3.375 Gram(s) IV Intermittent every 8 hours      ** LABS **                          10.0   8.37  )-----------( 348      ( 19 May 2019 05:20 )             32.0     18 May 2019 12:30    140    |  102    |  7      ----------------------------<  104    3.6     |  25     |  0.88     Ca    9.5        18 May 2019 12:30    TPro  7.6    /  Alb  3.8    /  TBili  0.4    /  DBili  x      /  AST  16     /  ALT  23     /  AlkPhos  134    18 May 2019 12:30    PT/INR - ( 18 May 2019 12:30 )   PT: 12.6 SEC;   INR: 1.10          PTT - ( 18 May 2019 12:30 )  PTT:28.4 SEC  CAPILLARY BLOOD GLUCOSE

## 2019-05-20 NOTE — DISCHARGE NOTE NURSING/CASE MANAGEMENT/SOCIAL WORK - NSDCDPATPORTLINK_GEN_ALL_CORE
You can access the [facility] Patient Portal, offered by [facility], by registering with the following website: [email or web address]

## 2019-05-20 NOTE — DISCHARGE NOTE NURSING/CASE MANAGEMENT/SOCIAL WORK - NSDCPECAREGIVERED_GEN_ALL_CORE
drink 9-13 eight oz. glasses of fluid daily. call md for follow up appt. call md for sign of infection (temp greater than 100.4f, redness at incision, pain not relieved by meds). meds./No

## 2019-05-21 ENCOUNTER — APPOINTMENT (OUTPATIENT)
Dept: PHYSICAL MEDICINE AND REHAB | Facility: CLINIC | Age: 59
End: 2019-05-21

## 2019-05-21 ENCOUNTER — APPOINTMENT (OUTPATIENT)
Dept: PLASTIC SURGERY | Facility: CLINIC | Age: 59
End: 2019-05-21
Payer: COMMERCIAL

## 2019-05-21 PROCEDURE — XXXXX: CPT

## 2019-05-21 PROCEDURE — 99024 POSTOP FOLLOW-UP VISIT: CPT

## 2019-05-22 LAB
-  AMIKACIN: SIGNIFICANT CHANGE UP
-  AMIKACIN: SIGNIFICANT CHANGE UP
-  AMPICILLIN/SULBACTAM: SIGNIFICANT CHANGE UP
-  AMPICILLIN/SULBACTAM: SIGNIFICANT CHANGE UP
-  AMPICILLIN: SIGNIFICANT CHANGE UP
-  AMPICILLIN: SIGNIFICANT CHANGE UP
-  AZTREONAM: SIGNIFICANT CHANGE UP
-  AZTREONAM: SIGNIFICANT CHANGE UP
-  CEFAZOLIN: SIGNIFICANT CHANGE UP
-  CEFAZOLIN: SIGNIFICANT CHANGE UP
-  CEFEPIME: SIGNIFICANT CHANGE UP
-  CEFEPIME: SIGNIFICANT CHANGE UP
-  CEFOXITIN: SIGNIFICANT CHANGE UP
-  CEFOXITIN: SIGNIFICANT CHANGE UP
-  CEFTAZIDIME: SIGNIFICANT CHANGE UP
-  CEFTAZIDIME: SIGNIFICANT CHANGE UP
-  CEFTRIAXONE: SIGNIFICANT CHANGE UP
-  CEFTRIAXONE: SIGNIFICANT CHANGE UP
-  CIPROFLOXACIN: SIGNIFICANT CHANGE UP
-  CIPROFLOXACIN: SIGNIFICANT CHANGE UP
-  ERTAPENEM: SIGNIFICANT CHANGE UP
-  ERTAPENEM: SIGNIFICANT CHANGE UP
-  GENTAMICIN: SIGNIFICANT CHANGE UP
-  GENTAMICIN: SIGNIFICANT CHANGE UP
-  IMIPENEM: SIGNIFICANT CHANGE UP
-  IMIPENEM: SIGNIFICANT CHANGE UP
-  LEVOFLOXACIN: SIGNIFICANT CHANGE UP
-  LEVOFLOXACIN: SIGNIFICANT CHANGE UP
-  MEROPENEM: SIGNIFICANT CHANGE UP
-  MEROPENEM: SIGNIFICANT CHANGE UP
-  NITROFURANTOIN: SIGNIFICANT CHANGE UP
-  NITROFURANTOIN: SIGNIFICANT CHANGE UP
-  PIPERACILLIN/TAZOBACTAM: SIGNIFICANT CHANGE UP
-  PIPERACILLIN/TAZOBACTAM: SIGNIFICANT CHANGE UP
-  TIGECYCLINE: SIGNIFICANT CHANGE UP
-  TIGECYCLINE: SIGNIFICANT CHANGE UP
-  TOBRAMYCIN: SIGNIFICANT CHANGE UP
-  TOBRAMYCIN: SIGNIFICANT CHANGE UP
-  TRIMETHOPRIM/SULFAMETHOXAZOLE: SIGNIFICANT CHANGE UP
-  TRIMETHOPRIM/SULFAMETHOXAZOLE: SIGNIFICANT CHANGE UP
CULTURE - SURGICAL SITE: SIGNIFICANT CHANGE UP
METHOD TYPE: SIGNIFICANT CHANGE UP
ORGANISM # SPEC MICROSCOPIC CNT: SIGNIFICANT CHANGE UP
ORGANISM # SPEC MICROSCOPIC CNT: SIGNIFICANT CHANGE UP

## 2019-05-23 ENCOUNTER — APPOINTMENT (OUTPATIENT)
Dept: PLASTIC SURGERY | Facility: CLINIC | Age: 59
End: 2019-05-23
Payer: COMMERCIAL

## 2019-05-23 LAB
BACTERIA BLD CULT: SIGNIFICANT CHANGE UP
BACTERIA BLD CULT: SIGNIFICANT CHANGE UP

## 2019-05-23 PROCEDURE — 99024 POSTOP FOLLOW-UP VISIT: CPT

## 2019-05-23 NOTE — REASON FOR VISIT
[FreeTextEntry1] :  DOS: 05/15/2019 s/p revision of bilateral breast reconstruction and abdominal donor site. pt states she has questions she would like to discuss w/ MD. pt is doing well.\par

## 2019-05-26 NOTE — HISTORY OF PRESENT ILLNESS
[FreeTextEntry1] : Pt s/p bilateral mastectomy with MEEK reconstruction.  Pt doing well no complaints

## 2019-05-26 NOTE — REASON FOR VISIT
[Follow-Up: _____] : a [unfilled] follow-up visit [FreeTextEntry1] : DOS: 03/27/2019 s/p bilateral mastectomy w/ MEEK. Patient is c/o pain and discomfort.

## 2019-05-26 NOTE — PHYSICAL EXAM
[NI] : Normal [de-identified] : Breast flaps soft viable healing well +epidermolysis on mastectomy flaps.  Drains removed from each breast [de-identified] : Abd incision healing well no sign of infection no erythema.  Will maintain drain

## 2019-06-05 ENCOUNTER — APPOINTMENT (OUTPATIENT)
Dept: PLASTIC SURGERY | Facility: CLINIC | Age: 59
End: 2019-06-05
Payer: COMMERCIAL

## 2019-06-05 PROCEDURE — 99024 POSTOP FOLLOW-UP VISIT: CPT

## 2019-06-05 NOTE — REASON FOR VISIT
[Post Op: _________] : a [unfilled] post op visit [FreeTextEntry1] : DOS: 05/15/2019 s/p revision of bilateral breast reconstruction and abdominal donor site. Patient c/o abdominal discomfort and bleeding of abdomen and right breast.

## 2019-06-19 ENCOUNTER — APPOINTMENT (OUTPATIENT)
Dept: PLASTIC SURGERY | Facility: CLINIC | Age: 59
End: 2019-06-19
Payer: COMMERCIAL

## 2019-06-19 VITALS
OXYGEN SATURATION: 99 % | WEIGHT: 239 LBS | TEMPERATURE: 97.8 F | HEART RATE: 112 BPM | BODY MASS INDEX: 43.98 KG/M2 | DIASTOLIC BLOOD PRESSURE: 83 MMHG | RESPIRATION RATE: 18 BRPM | SYSTOLIC BLOOD PRESSURE: 140 MMHG | HEIGHT: 62 IN

## 2019-06-19 PROCEDURE — 99024 POSTOP FOLLOW-UP VISIT: CPT

## 2019-06-26 NOTE — REASON FOR VISIT
[Post Op: _________] : a [unfilled] post op visit [FreeTextEntry1] : DOS: 05/15/2019 s/p revision of bilateral breast reconstruction and abdominal donor site.Patient states she is doing well,has no complaints.\par

## 2019-07-15 ENCOUNTER — APPOINTMENT (OUTPATIENT)
Dept: PLASTIC SURGERY | Facility: CLINIC | Age: 59
End: 2019-07-15
Payer: COMMERCIAL

## 2019-07-15 PROCEDURE — 99024 POSTOP FOLLOW-UP VISIT: CPT

## 2019-07-15 NOTE — REASON FOR VISIT
[FreeTextEntry1] :  DOS: 05/15/2019 s/p revision of bilateral breast reconstruction and abdominal donor site.Patient states she is doing well,has no complaints.\par \par

## 2019-07-22 ENCOUNTER — OUTPATIENT (OUTPATIENT)
Dept: OUTPATIENT SERVICES | Facility: HOSPITAL | Age: 59
LOS: 1 days | Discharge: HOME | End: 2019-07-22

## 2019-07-22 DIAGNOSIS — M12.9 ARTHROPATHY, UNSPECIFIED: Chronic | ICD-10-CM

## 2019-07-22 DIAGNOSIS — Z90.49 ACQUIRED ABSENCE OF OTHER SPECIFIED PARTS OF DIGESTIVE TRACT: Chronic | ICD-10-CM

## 2019-07-22 DIAGNOSIS — C50.919 MALIGNANT NEOPLASM OF UNSPECIFIED SITE OF UNSPECIFIED FEMALE BREAST: ICD-10-CM

## 2019-07-22 DIAGNOSIS — Z98.890 OTHER SPECIFIED POSTPROCEDURAL STATES: Chronic | ICD-10-CM

## 2019-07-22 DIAGNOSIS — Z90.13 ACQUIRED ABSENCE OF BILATERAL BREASTS AND NIPPLES: Chronic | ICD-10-CM

## 2019-07-26 ENCOUNTER — RESULT REVIEW (OUTPATIENT)
Age: 59
End: 2019-07-26

## 2019-07-26 ENCOUNTER — APPOINTMENT (OUTPATIENT)
Dept: HEMATOLOGY ONCOLOGY | Facility: CLINIC | Age: 59
End: 2019-07-26
Payer: COMMERCIAL

## 2019-07-26 VITALS
DIASTOLIC BLOOD PRESSURE: 103 MMHG | OXYGEN SATURATION: 99 % | WEIGHT: 240 LBS | BODY MASS INDEX: 43.9 KG/M2 | HEART RATE: 77 BPM | RESPIRATION RATE: 16 BRPM | TEMPERATURE: 98.3 F | SYSTOLIC BLOOD PRESSURE: 138 MMHG

## 2019-07-26 DIAGNOSIS — R23.2 FLUSHING: ICD-10-CM

## 2019-07-26 LAB
HCT VFR BLD CALC: 36.9 % — SIGNIFICANT CHANGE UP (ref 34.5–45)
HGB BLD-MCNC: 11.7 G/DL — SIGNIFICANT CHANGE UP (ref 11.5–15.5)
MCHC RBC-ENTMCNC: 23.4 PG — LOW (ref 27–34)
MCHC RBC-ENTMCNC: 31.6 G/DL — LOW (ref 32–36)
MCV RBC AUTO: 74.1 FL — LOW (ref 80–100)
PLATELET # BLD AUTO: 386 K/UL — SIGNIFICANT CHANGE UP (ref 150–400)
RBC # BLD: 4.98 M/UL — SIGNIFICANT CHANGE UP (ref 3.8–5.2)
RBC # FLD: 17.5 % — HIGH (ref 10.3–14.5)
WBC # BLD: 7.5 K/UL — SIGNIFICANT CHANGE UP (ref 3.8–10.5)
WBC # FLD AUTO: 7.5 K/UL — SIGNIFICANT CHANGE UP (ref 3.8–10.5)

## 2019-07-26 PROCEDURE — 99214 OFFICE O/P EST MOD 30 MIN: CPT

## 2019-07-26 RX ORDER — ANASTROZOLE TABLETS 1 MG/1
1 TABLET ORAL DAILY
Qty: 1 | Refills: 1 | Status: DISCONTINUED | COMMUNITY
Start: 2019-04-26 | End: 2019-07-26

## 2019-07-26 RX ORDER — AMOXICILLIN AND CLAVULANATE POTASSIUM 875; 125 MG/1; MG/1
875-125 TABLET, COATED ORAL TWICE DAILY
Qty: 14 | Refills: 0 | Status: DISCONTINUED | COMMUNITY
Start: 2019-04-15 | End: 2019-07-26

## 2019-07-26 NOTE — ASSESSMENT
[FreeTextEntry1] : She is a 60 y/o  F with Stage IA left breast cancer s/p bilateral mastectomy with sentinel lymph node biopsy. She had diarrhea (unpredictable), hair loss, hot flashes, fatigue, and worsened insomnia with anastrozole. We reviewed stopping therapy for 2 weeks and switching to exemestane. We reviewed that diarrhea may be from lactose in anastrozole pill and will change therapy. She will start Biotin vitamin to help with hair loss from estrogen blockade and exemestane may have some hair growth. We reviewed taking medication at night to see if fatigue is improved. Reviewed melatonin 2 hours prior to sleep to see if sleep will improve. Reviewed supportive measures and questions answered to her satisfaction. Will check CMP and Vitamin D. Next follow up in 3 months. \par \par BLE edema: will elevate legs and watch sodium. If any pain or worsening swelling, will consider doppler.

## 2019-07-26 NOTE — REVIEW OF SYSTEMS
[Fatigue] : fatigue [Diarrhea] : diarrhea [Insomnia] : insomnia [Hot Flashes] : hot flashes [Negative] : Allergic/Immunologic [Fever] : no fever [Chills] : no chills [Night Sweats] : no night sweats [Abdominal Pain] : no abdominal pain [Recent Change In Weight] : ~T no recent weight change [Vomiting] : no vomiting [Constipation] : no constipation [Skin Rash] : no skin rash [Skin Wound] : no skin wound [Confused] : no confusion [Fainting] : no fainting [Dizziness] : no dizziness [Difficulty Walking] : no difficulty walking [Suicidal] : not suicidal [Anxiety] : no anxiety [Depression] : no depression [Proptosis] : no proptosis [Muscle Weakness] : no muscle weakness [Deepening Of The Voice] : no deepening of the voice [de-identified] : hair loss [de-identified] : numbness sensation over reconstruction site

## 2019-07-26 NOTE — HISTORY OF PRESENT ILLNESS
[Disease: _____________________] : Disease: [unfilled] [N: ___] : N[unfilled] [T: ___] : T[unfilled] [AJCC Stage: ____] : AJCC Stage: [unfilled] [Date: ____________] : Patient's last distress assessment performed on [unfilled]. [7 - Distress Level] : Distress Level: 7 [Referred Patient  to social work for follow-up] : Patient was referred to social work for follow-up [de-identified] : Age 59: left breast cancer \par Screening mammogram on 12/16/2018 showed focal asymmetry in the posterior central lateral left breast and additional imaging showing 7 mm spiculated mass at the left 5:00 area. She had left breast biopsy which showed invasive mammary carcinoma with lobular features ER >90%, OR 24.5%, [id number] negative and LCIS. MRI of the breast showed biopsy proven nonmass enhancement along with left breast 4:00 enhancement and 9:00 enhancement. She had MRI guided biopsy of the L 4:00 area that showed mild proliferative fibrocystic changes and 9:00 area that showed LCIS and fibrocystic changes. On 3/27/19, she underwent R prophylactic mastectomy with SLN biopsy and L mastectomy and sentinel lymph node biopsy with Dr Disla. The pathology showed R breast LCIS and L left mastectomy showed invasive lobular carcinoma and extensive LCIS classic type with extensive ductal extension and negative sentinel lymph nodes. OncotypeDx score was 10. She had MEEK flap reconstruction with Dr Mcclain.  [de-identified] : invasive lobular carcinoma ER >90%, [place] 24.5%, [id number] negative  [de-identified] : Noticing diarrhea that is explosive and frequent at times: needs to go when she has sensation. She has hair loss: noticeable when she is brushing hair and with hairdresser. She has fatigue: takes anastrozole in the am and feels tired with long drives. Has had trouble sleeping in the past but worse: unable to sleep on her stomach since surgery and hard to go and stay asleep. Has not tried melatonin yet.  Feels hot flashes come and go/ more frequent and may be affecting sleep.

## 2019-07-26 NOTE — PHYSICAL EXAM
[Fully active, able to carry on all pre-disease performance without restriction] : Status 0 - Fully active, able to carry on all pre-disease performance without restriction [Normal] : affect appropriate [de-identified] : bilateral mastectomy with reconstruction: dressing over MEEK flap sites  [de-identified] : abdominal site healing  [de-identified] : LLE edema 2+ RLE edema 1+

## 2019-07-27 LAB
25(OH)D3 SERPL-MCNC: 30.9 NG/ML
ALBUMIN SERPL ELPH-MCNC: 4.4 G/DL
ALP BLD-CCNC: 195 U/L
ALT SERPL-CCNC: 29 U/L
ANION GAP SERPL CALC-SCNC: 14 MMOL/L
AST SERPL-CCNC: 23 U/L
BILIRUB SERPL-MCNC: <0.2 MG/DL
BUN SERPL-MCNC: 16 MG/DL
CALCIUM SERPL-MCNC: 9.5 MG/DL
CHLORIDE SERPL-SCNC: 106 MMOL/L
CO2 SERPL-SCNC: 23 MMOL/L
CREAT SERPL-MCNC: 0.86 MG/DL
GLUCOSE SERPL-MCNC: 92 MG/DL
POTASSIUM SERPL-SCNC: 4.5 MMOL/L
PROT SERPL-MCNC: 7.3 G/DL
SODIUM SERPL-SCNC: 143 MMOL/L

## 2019-07-29 ENCOUNTER — OTHER (OUTPATIENT)
Age: 59
End: 2019-07-29

## 2019-07-30 ENCOUNTER — OTHER (OUTPATIENT)
Age: 59
End: 2019-07-30

## 2019-08-26 ENCOUNTER — APPOINTMENT (OUTPATIENT)
Dept: PLASTIC SURGERY | Facility: CLINIC | Age: 59
End: 2019-08-26
Payer: COMMERCIAL

## 2019-08-26 PROCEDURE — 99212 OFFICE O/P EST SF 10 MIN: CPT

## 2019-08-27 NOTE — REASON FOR VISIT
[Follow-Up: _____] : a [unfilled] follow-up visit [FreeTextEntry1] : s/p bilateral breast reconstruction with MEEK flaps and revision of bilateral breast reconstruction and abdominal donor site

## 2019-10-28 NOTE — REASON FOR VISIT
[Follow-Up: _____] : a [unfilled] follow-up visit [FreeTextEntry1] : DOS: 05/15/2019 s/p revision of bilateral breast reconstruction and abdominal donor site.

## 2019-10-31 ENCOUNTER — OUTPATIENT (OUTPATIENT)
Dept: OUTPATIENT SERVICES | Facility: HOSPITAL | Age: 59
LOS: 1 days | Discharge: HOME | End: 2019-10-31

## 2019-10-31 ENCOUNTER — APPOINTMENT (OUTPATIENT)
Dept: SURGICAL ONCOLOGY | Facility: CLINIC | Age: 59
End: 2019-10-31
Payer: COMMERCIAL

## 2019-10-31 VITALS
OXYGEN SATURATION: 99 % | HEIGHT: 62 IN | WEIGHT: 239 LBS | RESPIRATION RATE: 17 BRPM | SYSTOLIC BLOOD PRESSURE: 135 MMHG | TEMPERATURE: 97.8 F | HEART RATE: 72 BPM | DIASTOLIC BLOOD PRESSURE: 80 MMHG | BODY MASS INDEX: 43.98 KG/M2

## 2019-10-31 DIAGNOSIS — M12.9 ARTHROPATHY, UNSPECIFIED: Chronic | ICD-10-CM

## 2019-10-31 DIAGNOSIS — Z98.890 OTHER SPECIFIED POSTPROCEDURAL STATES: Chronic | ICD-10-CM

## 2019-10-31 DIAGNOSIS — C50.919 MALIGNANT NEOPLASM OF UNSPECIFIED SITE OF UNSPECIFIED FEMALE BREAST: ICD-10-CM

## 2019-10-31 DIAGNOSIS — Z90.13 ACQUIRED ABSENCE OF BILATERAL BREASTS AND NIPPLES: Chronic | ICD-10-CM

## 2019-10-31 DIAGNOSIS — Z90.49 ACQUIRED ABSENCE OF OTHER SPECIFIED PARTS OF DIGESTIVE TRACT: Chronic | ICD-10-CM

## 2019-10-31 PROCEDURE — 99214 OFFICE O/P EST MOD 30 MIN: CPT

## 2019-10-31 NOTE — HISTORY OF PRESENT ILLNESS
[de-identified] : 59-year-old female presents for her follow up exam.  She is status post bilateral mastectomy with bilateral MEEK reconstruction by Dr. Anmol Mcclain on 3/27/19.  Final path: 0.8cm invasive lobular carcinoma in the left breast with 7 negative LNs.  Right breast with LCIS and 5 negative LNs.  Recurrence score = 10.  She is currently taking exemestane daily under the guidance of Dr. Mello Parr (med/onc).\par \par Denies chest wall discomfort.  States she has a palpable area of concern in the right reconstructed breast.

## 2019-10-31 NOTE — ASSESSMENT
[FreeTextEntry1] : Impression:\par No evidence of new or recurrent lesions - s/p b/l mastectomy for invasive lobular carcinoma Left breast 3/2019. No suspicious lesions on exam.\par On exemestane daily \par \par \par Plan:\par RTO annually for surveillance\par Continued follow up with Dr. Mello Parr\par

## 2019-10-31 NOTE — PHYSICAL EXAM
[Normal] : supple, no neck mass and thyroid not enlarged [Normal Supraclavicular Lymph Nodes] : normal supraclavicular lymph nodes [Normal Axillary Lymph Nodes] : normal axillary lymph nodes [Normal] : oriented to person, place and time, with appropriate affect [FreeTextEntry1] : RC present for exam.  [de-identified] : Normal S1, S2. Regular rate and rhythm. [de-identified] : Complete breast exam performed in supine and upright positions. s/p b/l mastectomy with MEEK reconstruction.  Fat necrosis right reconstructed breast 12:00.  No palpable nodules or  signs of local recurrence. [de-identified] : Clear breath sounds bilaterally, normal respiratory effort.

## 2019-11-12 NOTE — ASU PREOP CHECKLIST - PATIENT'S PERSONAL PROPERTY REMOVED
patient is schedule for surgery on 12/11 with Dr Tuan Grijalva for Lumbar hardware removal and laminectomy Spine
glasses

## 2019-11-13 ENCOUNTER — APPOINTMENT (OUTPATIENT)
Dept: HEMATOLOGY ONCOLOGY | Facility: CLINIC | Age: 59
End: 2019-11-13
Payer: COMMERCIAL

## 2019-11-13 ENCOUNTER — LABORATORY RESULT (OUTPATIENT)
Age: 59
End: 2019-11-13

## 2019-11-13 ENCOUNTER — RESULT REVIEW (OUTPATIENT)
Age: 59
End: 2019-11-13

## 2019-11-13 VITALS
SYSTOLIC BLOOD PRESSURE: 141 MMHG | TEMPERATURE: 98.5 F | OXYGEN SATURATION: 98 % | RESPIRATION RATE: 16 BRPM | DIASTOLIC BLOOD PRESSURE: 84 MMHG | BODY MASS INDEX: 43.9 KG/M2 | WEIGHT: 240 LBS | HEART RATE: 79 BPM

## 2019-11-13 LAB
ALBUMIN SERPL ELPH-MCNC: 4.3 G/DL
ALP BLD-CCNC: 221 U/L
ALT SERPL-CCNC: 35 U/L
ANION GAP SERPL CALC-SCNC: 11 MMOL/L
AST SERPL-CCNC: 22 U/L
BILIRUB SERPL-MCNC: 0.2 MG/DL
BUN SERPL-MCNC: 14 MG/DL
CALCIUM SERPL-MCNC: 9.7 MG/DL
CHLORIDE SERPL-SCNC: 107 MMOL/L
CO2 SERPL-SCNC: 25 MMOL/L
CREAT SERPL-MCNC: 0.95 MG/DL
GLUCOSE SERPL-MCNC: 73 MG/DL
HCT VFR BLD CALC: 44 % — SIGNIFICANT CHANGE UP (ref 34.5–45)
HGB BLD-MCNC: 14.1 G/DL — SIGNIFICANT CHANGE UP (ref 11.5–15.5)
MCHC RBC-ENTMCNC: 26 PG — LOW (ref 27–34)
MCHC RBC-ENTMCNC: 32 G/DL — SIGNIFICANT CHANGE UP (ref 32–36)
MCV RBC AUTO: 81.3 FL — SIGNIFICANT CHANGE UP (ref 80–100)
PLATELET # BLD AUTO: 342 K/UL — SIGNIFICANT CHANGE UP (ref 150–400)
POTASSIUM SERPL-SCNC: 4.7 MMOL/L
PROT SERPL-MCNC: 7.2 G/DL
RBC # BLD: 5.4 M/UL — HIGH (ref 3.8–5.2)
RBC # FLD: 15.5 % — HIGH (ref 10.3–14.5)
SODIUM SERPL-SCNC: 143 MMOL/L
WBC # BLD: 9.6 K/UL — SIGNIFICANT CHANGE UP (ref 3.8–10.5)
WBC # FLD AUTO: 9.6 K/UL — SIGNIFICANT CHANGE UP (ref 3.8–10.5)

## 2019-11-13 PROCEDURE — 99214 OFFICE O/P EST MOD 30 MIN: CPT

## 2019-11-13 NOTE — ASSESSMENT
[FreeTextEntry1] : She is a 58 y/o  F with Stage IA left breast cancer s/p bilateral mastectomy with sentinel lymph node biopsy. She is tolerating exemestane much better with expected hot flashes. She will continue with supportive measures. We encouraged low fat diet and exercise. We reviewed pros and cons of genetic testing. We reviewed limitations of current testing. We reviewed results can be positive, negative or VUS. Only a positive result would change surveillance or recommendations. Questions answered to her satisfaction. She consented to genetic testing. Next follow up in 4 months.

## 2019-11-13 NOTE — PHYSICAL EXAM
[Fully active, able to carry on all pre-disease performance without restriction] : Status 0 - Fully active, able to carry on all pre-disease performance without restriction [Normal] : affect appropriate [de-identified] : bilateral mastectomy with MEEK flap reconstruction; no palpable axillary LN or abnl masses  [de-identified] : MEEK flap site

## 2019-11-13 NOTE — CONSULT LETTER
[Courtesy Letter:] : I had the pleasure of seeing your patient, [unfilled], in my office today. [Dear  ___] : Dear  [unfilled], [Consult Closing:] : Thank you very much for allowing me to participate in the care of this patient.  If you have any questions, please do not hesitate to contact me. [Please see my note below.] : Please see my note below. [Sincerely,] : Sincerely, [FreeTextEntry2] : Davy Disla MD\par [address] [place] [address]\[place] [address] [DrBruno  ___] : Dr. NICKERSON [FreeTextEntry3] : Mello Parr MD\par Attending\par [facility]\par

## 2019-11-13 NOTE — REVIEW OF SYSTEMS
[Proptosis] : no proptosis [Hot Flashes] : hot flashes [Deepening Of The Voice] : no deepening of the voice [Muscle Weakness] : no muscle weakness [Negative] : Allergic/Immunologic

## 2019-11-13 NOTE — HISTORY OF PRESENT ILLNESS
[T: ___] : T[unfilled] [Disease: _____________________] : Disease: [unfilled] [AJCC Stage: ____] : AJCC Stage: [unfilled] [N: ___] : N[unfilled] [de-identified] : Age 59: left breast cancer \par Screening mammogram on 12/16/2018 showed focal asymmetry in the posterior central lateral left breast and additional imaging showing 7 mm spiculated mass at the left 5:00 area. She had left breast biopsy which showed invasive mammary carcinoma with lobular features ER >90%, SD 24.5%, [id number] negative and LCIS. MRI of the breast showed biopsy proven nonmass enhancement along with left breast 4:00 enhancement and 9:00 enhancement. She had MRI guided biopsy of the L 4:00 area that showed mild proliferative fibrocystic changes and 9:00 area that showed LCIS and fibrocystic changes. On 3/27/19, she underwent R prophylactic mastectomy with SLN biopsy and L mastectomy and sentinel lymph node biopsy with Dr Disla. The pathology showed R breast LCIS and L left mastectomy showed invasive lobular carcinoma and extensive LCIS classic type with extensive ductal extension and negative sentinel lymph nodes. OncotypeDx score was 10. She had MEEK flap reconstruction with Dr Mcclain. She had issues with diarrhea and hair loss with anastrozole and was switched to exemestane.  [de-identified] : invasive lobular carcinoma ER >90%, [place] 24.5%, Epc1rhx negative  [de-identified] : exemestane 7/26/19 to present \par [facility] 11/13/19  [de-identified] : Feels like she is tolerating exemestane better: hair loss has stopped. No longer having diarrhea. She has hot sensation throughout the day. Does not affect activities. She is able to sleep at night. Takes in am and prefers so that she does not forget. Denies any new joint pain. No new medications. Would like to consider genetic testing. She has brother and father with history of melanoma and she had melanoma and breast cancer.

## 2019-11-14 ENCOUNTER — APPOINTMENT (OUTPATIENT)
Dept: PLASTIC SURGERY | Facility: CLINIC | Age: 59
End: 2019-11-14
Payer: COMMERCIAL

## 2019-11-14 PROCEDURE — 99214 OFFICE O/P EST MOD 30 MIN: CPT

## 2019-11-15 NOTE — REASON FOR VISIT
[Follow-Up: _____] : a [unfilled] follow-up visit [FreeTextEntry1] : s/p bilateral breast reconstruction with MEEK flaps

## 2020-01-15 ENCOUNTER — APPOINTMENT (OUTPATIENT)
Dept: PLASTIC SURGERY | Facility: CLINIC | Age: 60
End: 2020-01-15
Payer: COMMERCIAL

## 2020-01-15 VITALS
BODY MASS INDEX: 44.48 KG/M2 | WEIGHT: 241.7 LBS | SYSTOLIC BLOOD PRESSURE: 146 MMHG | HEIGHT: 62 IN | HEART RATE: 83 BPM | TEMPERATURE: 98 F | DIASTOLIC BLOOD PRESSURE: 83 MMHG

## 2020-01-15 PROCEDURE — 99213 OFFICE O/P EST LOW 20 MIN: CPT

## 2020-01-15 NOTE — REASON FOR VISIT
[Follow-Up: _____] : a [unfilled] follow-up visit [FreeTextEntry1] : Pt is present today to discuss Pre-op surgery on 1/31/2020 s/p bilateral breast reconstruction with MEEK flaps revision. Pt denies any other concerns.

## 2020-01-15 NOTE — HISTORY OF PRESENT ILLNESS
[FreeTextEntry1] : Patient status post bilateral MEEK breast reconstruction. Patient desires revision with mastopexy of the breasts and reduction of abdomen if possible. Patient complains that she lost her pannus but she has abdominal girth and has difficulty keeping pants on. Also she notes that the breasts are heavy. The patient's BMI is around 40 though she had gastric bypass in 2004 and has only lost 20 pounds after the surgery.

## 2020-01-15 NOTE — PHYSICAL EXAM
[de-identified] : Status post bilateral MEEK flap reconstruction. some mastectomy flap edema. healed well.  [NI] : Normal [de-identified] : No pannus, marked intra-abdominal fat and abdominal wall thickness. Incisions healed well  [de-identified] : mastectomy flap edema, mild

## 2020-01-24 ENCOUNTER — OUTPATIENT (OUTPATIENT)
Dept: OUTPATIENT SERVICES | Facility: HOSPITAL | Age: 60
LOS: 1 days | End: 2020-01-24
Payer: COMMERCIAL

## 2020-01-24 VITALS
RESPIRATION RATE: 16 BRPM | OXYGEN SATURATION: 99 % | DIASTOLIC BLOOD PRESSURE: 84 MMHG | WEIGHT: 237 LBS | SYSTOLIC BLOOD PRESSURE: 124 MMHG | HEIGHT: 62 IN | TEMPERATURE: 99 F | HEART RATE: 89 BPM

## 2020-01-24 DIAGNOSIS — Z90.13 ACQUIRED ABSENCE OF BILATERAL BREASTS AND NIPPLES: Chronic | ICD-10-CM

## 2020-01-24 DIAGNOSIS — M12.9 ARTHROPATHY, UNSPECIFIED: Chronic | ICD-10-CM

## 2020-01-24 DIAGNOSIS — Z98.890 OTHER SPECIFIED POSTPROCEDURAL STATES: Chronic | ICD-10-CM

## 2020-01-24 DIAGNOSIS — N65.0 DEFORMITY OF RECONSTRUCTED BREAST: ICD-10-CM

## 2020-01-24 DIAGNOSIS — Z90.49 ACQUIRED ABSENCE OF OTHER SPECIFIED PARTS OF DIGESTIVE TRACT: Chronic | ICD-10-CM

## 2020-01-24 DIAGNOSIS — C50.919 MALIGNANT NEOPLASM OF UNSPECIFIED SITE OF UNSPECIFIED FEMALE BREAST: ICD-10-CM

## 2020-01-24 LAB
ANION GAP SERPL CALC-SCNC: 11 MMO/L — SIGNIFICANT CHANGE UP (ref 7–14)
BUN SERPL-MCNC: 14 MG/DL — SIGNIFICANT CHANGE UP (ref 7–23)
CALCIUM SERPL-MCNC: 9.6 MG/DL — SIGNIFICANT CHANGE UP (ref 8.4–10.5)
CHLORIDE SERPL-SCNC: 104 MMOL/L — SIGNIFICANT CHANGE UP (ref 98–107)
CO2 SERPL-SCNC: 28 MMOL/L — SIGNIFICANT CHANGE UP (ref 22–31)
CREAT SERPL-MCNC: 1.41 MG/DL — HIGH (ref 0.5–1.3)
GLUCOSE SERPL-MCNC: 83 MG/DL — SIGNIFICANT CHANGE UP (ref 70–99)
HCT VFR BLD CALC: 43.7 % — SIGNIFICANT CHANGE UP (ref 34.5–45)
HGB BLD-MCNC: 13.4 G/DL — SIGNIFICANT CHANGE UP (ref 11.5–15.5)
MCHC RBC-ENTMCNC: 26 PG — LOW (ref 27–34)
MCHC RBC-ENTMCNC: 30.7 % — LOW (ref 32–36)
MCV RBC AUTO: 84.7 FL — SIGNIFICANT CHANGE UP (ref 80–100)
NRBC # FLD: 0 K/UL — SIGNIFICANT CHANGE UP (ref 0–0)
PLATELET # BLD AUTO: 315 K/UL — SIGNIFICANT CHANGE UP (ref 150–400)
PMV BLD: 9.6 FL — SIGNIFICANT CHANGE UP (ref 7–13)
POTASSIUM SERPL-MCNC: 4.4 MMOL/L — SIGNIFICANT CHANGE UP (ref 3.5–5.3)
POTASSIUM SERPL-SCNC: 4.4 MMOL/L — SIGNIFICANT CHANGE UP (ref 3.5–5.3)
RBC # BLD: 5.16 M/UL — SIGNIFICANT CHANGE UP (ref 3.8–5.2)
RBC # FLD: 16.6 % — HIGH (ref 10.3–14.5)
SODIUM SERPL-SCNC: 143 MMOL/L — SIGNIFICANT CHANGE UP (ref 135–145)
WBC # BLD: 7.4 K/UL — SIGNIFICANT CHANGE UP (ref 3.8–10.5)
WBC # FLD AUTO: 7.4 K/UL — SIGNIFICANT CHANGE UP (ref 3.8–10.5)

## 2020-01-24 PROCEDURE — 93010 ELECTROCARDIOGRAM REPORT: CPT

## 2020-01-24 RX ORDER — SODIUM CHLORIDE 9 MG/ML
1000 INJECTION, SOLUTION INTRAVENOUS
Refills: 0 | Status: DISCONTINUED | OUTPATIENT
Start: 2020-01-31 | End: 2020-02-17

## 2020-01-24 NOTE — H&P PST ADULT - HISTORY OF PRESENT ILLNESS
58 y/o female with ELI on CPAP, Asthma, Depression and Anxiety presents to PST for preoperative evaluation with dx of encounter for breast reconstruction following mastectomy. Pt diagnosed with Left Breast CA and s/p bilateral mastectomy and MEEK flap reconstruction March 2019.  She presents today for further reconstructive surgery. Scheduled for Revision of Bilateral Breast Reconstruction and Abdominal Donor Site on 5/15/2019. 61 y/o female with ELI on CPAP, Asthma on Breo Ellipta and proair;, Depression and Anxiety presents to PST for preoperative evaluation to have revision of bilat breast reconstruction and abdominal donor site, trunk liposuction with breast fat grafting on 1/31/20. Pt had Revision of Bilateral Breast Reconstruction and Abdominal Donor Site on 5/15/2019.  Pt had  Left Breast CA and s/p bilateral mastectomy and MEEK flap reconstruction March 2019.

## 2020-01-24 NOTE — H&P PST ADULT - ASSESSMENT
59 y/o female with ELI on CPAP, Asthma on Breo Ellipta and proair;, Depression and Anxiety presents to PST for preoperative evaluation to have revision of bilat breast reconstruction and abdominal donor site, trunk liposuction with breast fat grafting on 1/31/20. Pt had Revision of Bilateral Breast Reconstruction and Abdominal Donor Site on 5/15/2019.  Pt had  Left Breast CA and s/p bilateral mastectomy and MEEK flap reconstruction March 2019. 59 y/o female with ELI on CPAP, Asthma on Breo Ellipta and proair;, Depression and Anxiety presents to PST for preoperative evaluation to have revision of bilat breast reconstruction and abdominal donor site, trunk liposuction with breast fat grafting on 1/31/20.

## 2020-01-24 NOTE — H&P PST ADULT - NSICDXPASTMEDICALHX_GEN_ALL_CORE_FT
PAST MEDICAL HISTORY:  Anemia, unspecified type     Anxiety and depression     Breast cancer left side in 2019    Encounter for breast reconstruction following mastectomy     History of gastric ulcer     History of melanoma removed from back.    History of shingles October 2018    Incisional hernia, without obstruction or gangrene     Obesity, unspecified obesity severity, unspecified obesity type     Sleep apnea, obstructive uses device at night    Uncomplicated asthma, unspecified asthma severity PAST MEDICAL HISTORY:  Anemia, unspecified type     Anxiety and depression     Breast cancer left side in 2019    Encounter for breast reconstruction following mastectomy     History of gastric ulcer     History of melanoma removed from back.    History of shingles October 2018    Incisional hernia, without obstruction or gangrene     Morbid obesity     Obesity, unspecified obesity severity, unspecified obesity type     Sleep apnea, obstructive uses device at night    Uncomplicated asthma, unspecified asthma severity

## 2020-01-24 NOTE — H&P PST ADULT - NSICDXFAMILYHX_GEN_ALL_CORE_FT
FAMILY HISTORY:  Father  Still living? Yes, Estimated age: 81-90  Family history of Alzheimer's disease, Age at diagnosis: Age Unknown  Family history of COPD (chronic obstructive pulmonary disease), Age at diagnosis: Age Unknown  Family history of diabetes mellitus, Age at diagnosis: Age Unknown  Family history of heart disease, Age at diagnosis: Age Unknown FAMILY HISTORY:  Family history of Alzheimer's disease, father  Family history of COPD (chronic obstructive pulmonary disease), father  Family history of diabetes mellitus, father  Family history of heart disease, father

## 2020-01-24 NOTE — H&P PST ADULT - NEGATIVE NEUROLOGICAL SYMPTOMS
no focal seizures/no cva/no transient paralysis/no headache/no generalized seizures/no weakness/no vertigo

## 2020-01-24 NOTE — H&P PST ADULT - NSICDXPROBLEM_GEN_ALL_CORE_FT
PROBLEM DIAGNOSES  Problem: Encounter for breast reconstruction following mastectomy  Assessment and Plan: Scheduled for Revision of Bilateral Breast Reconstruction and Abdominal Donor Site on 5/15/2019.   Preop instructions given, pt verbalized understanding   GI prophylaxis provided     Problem: Anxiety and depression  Assessment and Plan: Pt to take Bupropion and Pristiq on day of surgery with sips of water     Problem: Asthma  Assessment and Plan: Pt to use Breo Ellipta inhaler on day of surgery     Problem: ELI on CPAP  Assessment and Plan: Confirmed h/o ELI- OR booking notified PROBLEM DIAGNOSES  Problem: Breast cancer  Assessment and Plan: PROBLEM DIAGNOSES  Problem: Breast cancer  Assessment and Plan: Pt scheduled for revision of bilat breast reconstruction and abdominal donor site, trunk liposuction with breast fat grafting.  Labs pending, EKG in chat.   Preop intructions provided to pt.   famotidine and chlorhexidine scrubs provided to pt with instructions      R/O PROBLEM DIAGNOSES  Problem: Obstructive sleep apnea  Assessment and Plan: ELI precautions recommended.  OR booking notified via fax.

## 2020-01-24 NOTE — H&P PST ADULT - GENERAL COMMENTS
Pt had low grade fever for 4-5 days post surgery. Treated for surgical site infection (abdominal donor site) P

## 2020-01-24 NOTE — H&P PST ADULT - RS GEN PE MLT RESP DETAILS PC
airway patent/breath sounds equal/respirations non-labored/good air movement/clear to auscultation bilaterally/no chest wall tenderness no rales/no wheezes/no rhonchi/good air movement/breath sounds equal/respirations non-labored/airway patent/clear to auscultation bilaterally/no chest wall tenderness

## 2020-01-30 ENCOUNTER — TRANSCRIPTION ENCOUNTER (OUTPATIENT)
Age: 60
End: 2020-01-30

## 2020-01-30 ENCOUNTER — APPOINTMENT (OUTPATIENT)
Dept: PLASTIC SURGERY | Facility: CLINIC | Age: 60
End: 2020-01-30
Payer: COMMERCIAL

## 2020-01-30 PROCEDURE — 99213 OFFICE O/P EST LOW 20 MIN: CPT | Mod: 25

## 2020-01-31 ENCOUNTER — RESULT REVIEW (OUTPATIENT)
Age: 60
End: 2020-01-31

## 2020-01-31 ENCOUNTER — OUTPATIENT (OUTPATIENT)
Dept: OUTPATIENT SERVICES | Facility: HOSPITAL | Age: 60
LOS: 1 days | Discharge: HOME | End: 2020-01-31
Payer: COMMERCIAL

## 2020-01-31 VITALS
DIASTOLIC BLOOD PRESSURE: 83 MMHG | RESPIRATION RATE: 18 BRPM | SYSTOLIC BLOOD PRESSURE: 134 MMHG | OXYGEN SATURATION: 100 % | HEART RATE: 63 BPM

## 2020-01-31 VITALS
RESPIRATION RATE: 16 BRPM | HEIGHT: 62 IN | TEMPERATURE: 98 F | HEART RATE: 68 BPM | WEIGHT: 237 LBS | OXYGEN SATURATION: 98 %

## 2020-01-31 DIAGNOSIS — M12.9 ARTHROPATHY, UNSPECIFIED: Chronic | ICD-10-CM

## 2020-01-31 DIAGNOSIS — N65.0 DEFORMITY OF RECONSTRUCTED BREAST: ICD-10-CM

## 2020-01-31 DIAGNOSIS — Z98.890 OTHER SPECIFIED POSTPROCEDURAL STATES: Chronic | ICD-10-CM

## 2020-01-31 DIAGNOSIS — Z90.49 ACQUIRED ABSENCE OF OTHER SPECIFIED PARTS OF DIGESTIVE TRACT: Chronic | ICD-10-CM

## 2020-01-31 DIAGNOSIS — Z90.13 ACQUIRED ABSENCE OF BILATERAL BREASTS AND NIPPLES: Chronic | ICD-10-CM

## 2020-01-31 PROCEDURE — 19366: CPT | Mod: LT

## 2020-01-31 PROCEDURE — 19366: CPT | Mod: RT

## 2020-01-31 PROCEDURE — 88307 TISSUE EXAM BY PATHOLOGIST: CPT | Mod: 26

## 2020-01-31 PROCEDURE — 15877 SUCTION LIPECTOMY TRUNK: CPT

## 2020-01-31 RX ORDER — OXYCODONE HYDROCHLORIDE 5 MG/1
5 TABLET ORAL EVERY 6 HOURS
Refills: 0 | Status: DISCONTINUED | OUTPATIENT
Start: 2020-01-31 | End: 2020-01-31

## 2020-01-31 RX ORDER — HYDROMORPHONE HYDROCHLORIDE 2 MG/ML
0.5 INJECTION INTRAMUSCULAR; INTRAVENOUS; SUBCUTANEOUS
Refills: 0 | Status: DISCONTINUED | OUTPATIENT
Start: 2020-01-31 | End: 2020-01-31

## 2020-01-31 RX ORDER — ONDANSETRON 8 MG/1
4 TABLET, FILM COATED ORAL ONCE
Refills: 0 | Status: DISCONTINUED | OUTPATIENT
Start: 2020-01-31 | End: 2020-02-17

## 2020-01-31 RX ADMIN — HYDROMORPHONE HYDROCHLORIDE 0.5 MILLIGRAM(S): 2 INJECTION INTRAMUSCULAR; INTRAVENOUS; SUBCUTANEOUS at 11:35

## 2020-01-31 RX ADMIN — HYDROMORPHONE HYDROCHLORIDE 0.5 MILLIGRAM(S): 2 INJECTION INTRAMUSCULAR; INTRAVENOUS; SUBCUTANEOUS at 11:25

## 2020-01-31 RX ADMIN — OXYCODONE HYDROCHLORIDE 5 MILLIGRAM(S): 5 TABLET ORAL at 14:42

## 2020-01-31 RX ADMIN — HYDROMORPHONE HYDROCHLORIDE 0.5 MILLIGRAM(S): 2 INJECTION INTRAMUSCULAR; INTRAVENOUS; SUBCUTANEOUS at 11:00

## 2020-01-31 RX ADMIN — HYDROMORPHONE HYDROCHLORIDE 0.5 MILLIGRAM(S): 2 INJECTION INTRAMUSCULAR; INTRAVENOUS; SUBCUTANEOUS at 10:46

## 2020-01-31 NOTE — ASU DISCHARGE PLAN (ADULT/PEDIATRIC) - CALL YOUR DOCTOR IF YOU HAVE ANY OF THE FOLLOWING:
Swelling that gets worse/Pain not relieved by Medications/Fever greater than (need to indicate Fahrenheit or Celsius)/Bleeding that does not stop Nausea and vomiting that does not stop/Pain not relieved by Medications/Inability to tolerate liquids or foods/Fever greater than (need to indicate Fahrenheit or Celsius)/Wound/Surgical Site with redness, or foul smelling discharge or pus/Bleeding that does not stop/Swelling that gets worse

## 2020-01-31 NOTE — ASU DISCHARGE PLAN (ADULT/PEDIATRIC) - PATIENT EDUCATION MATERIALS PROVIED
Pre-printed instructions given for drain care/Pre-printed instructions given for other (specify)/opioid safety

## 2020-01-31 NOTE — ASU DISCHARGE PLAN (ADULT/PEDIATRIC) - FOLLOW UP APPOINTMENTS
[facility] ASU (Adult): In the event you develop a complication and you are unable to reach your own physician, you may contact:  Ambulatory Surgery Unit [phone] Monday-Friday, 6am -9pm, or the emergency department at [phone]./[facility] (Adult):

## 2020-01-31 NOTE — ASU DISCHARGE PLAN (ADULT/PEDIATRIC) - CARE PROVIDER_API CALL
Anmol Mcclain; JOSE RAMON)  Otolaryngology; Plastic Surgery  [address] [place] [address] [address]  [place] [address]  Phone: [phone]  Fax: [phone]  Follow Up Time:

## 2020-01-31 NOTE — ASU DISCHARGE PLAN (ADULT/PEDIATRIC) - NURSING INSTRUCTIONS
Do not take pain medication on an empty stomach.  Increase fluids and fiber in diet to prevent constipation. no tylenol or acetominophen products until after 2:00pm today. please follow all dc medication instructions as per dr medina preoperatively. DENISSE drain teaching and written instructions given. Patient with good understanding Do not take pain medication on an empty stomach.  Increase fluids and fiber in diet to prevent constipation. no tylenol or acetominophen products until after 2:00pm today. please follow all dc medication instructions as per dr medina preoperatively. DENISSE drain teaching and written instructions given. Patient with good understanding. Contact the MD for any of the following symptoms:  pain not relieved by medications, fever 100.4 or greater, bleeding, redness, excessive swelling and/or warmth or drainage from the surgical site, inability to tolerate food and/or drink. Any shortness of breath and/or chest discomfort return to the ER.

## 2020-02-03 PROBLEM — E66.01 MORBID (SEVERE) OBESITY DUE TO EXCESS CALORIES: Chronic | Status: ACTIVE | Noted: 2020-01-24

## 2020-02-05 ENCOUNTER — APPOINTMENT (OUTPATIENT)
Dept: PLASTIC SURGERY | Facility: CLINIC | Age: 60
End: 2020-02-05
Payer: COMMERCIAL

## 2020-02-05 PROCEDURE — 99024 POSTOP FOLLOW-UP VISIT: CPT

## 2020-02-07 LAB — SURGICAL PATHOLOGY STUDY: SIGNIFICANT CHANGE UP

## 2020-02-08 NOTE — REASON FOR VISIT
[Follow-Up: _____] : a [unfilled] follow-up visit [FreeTextEntry1] : s/p revision of bilateral breast reconstruction and abdominal donor site 1/31/20

## 2020-02-12 ENCOUNTER — APPOINTMENT (OUTPATIENT)
Dept: PLASTIC SURGERY | Facility: CLINIC | Age: 60
End: 2020-02-12
Payer: COMMERCIAL

## 2020-02-12 PROCEDURE — 99024 POSTOP FOLLOW-UP VISIT: CPT

## 2020-02-18 ENCOUNTER — APPOINTMENT (OUTPATIENT)
Dept: PLASTIC SURGERY | Facility: CLINIC | Age: 60
End: 2020-02-18
Payer: COMMERCIAL

## 2020-02-18 PROCEDURE — 99024 POSTOP FOLLOW-UP VISIT: CPT

## 2020-03-02 ENCOUNTER — APPOINTMENT (OUTPATIENT)
Dept: PLASTIC SURGERY | Facility: CLINIC | Age: 60
End: 2020-03-02
Payer: COMMERCIAL

## 2020-03-02 PROCEDURE — 99024 POSTOP FOLLOW-UP VISIT: CPT

## 2020-03-02 NOTE — REASON FOR VISIT
[Post Op: _________] : a [unfilled] post op visit [FreeTextEntry1] : s/p revision of bilateral breast reconstruction and abdominal donor site 1/31/20.

## 2020-03-03 ENCOUNTER — APPOINTMENT (OUTPATIENT)
Dept: OBGYN | Facility: CLINIC | Age: 60
End: 2020-03-03
Payer: COMMERCIAL

## 2020-03-03 VITALS
BODY MASS INDEX: 43.61 KG/M2 | SYSTOLIC BLOOD PRESSURE: 136 MMHG | DIASTOLIC BLOOD PRESSURE: 86 MMHG | WEIGHT: 237 LBS | HEIGHT: 62 IN

## 2020-03-03 DIAGNOSIS — Z01.419 ENCOUNTER FOR GYNECOLOGICAL EXAMINATION (GENERAL) (ROUTINE) W/OUT ABNORMAL FINDINGS: ICD-10-CM

## 2020-03-03 PROCEDURE — 99396 PREV VISIT EST AGE 40-64: CPT

## 2020-03-04 ENCOUNTER — APPOINTMENT (OUTPATIENT)
Dept: OBGYN | Facility: CLINIC | Age: 60
End: 2020-03-04
Payer: COMMERCIAL

## 2020-03-04 ENCOUNTER — ASOB RESULT (OUTPATIENT)
Age: 60
End: 2020-03-04

## 2020-03-04 LAB — HPV HIGH+LOW RISK DNA PNL CVX: NOT DETECTED

## 2020-03-04 PROCEDURE — 76830 TRANSVAGINAL US NON-OB: CPT

## 2020-03-07 LAB — CYTOLOGY CVX/VAG DOC THIN PREP: NORMAL

## 2020-03-23 ENCOUNTER — OUTPATIENT (OUTPATIENT)
Dept: OUTPATIENT SERVICES | Facility: HOSPITAL | Age: 60
LOS: 1 days | Discharge: HOME | End: 2020-03-23

## 2020-03-23 DIAGNOSIS — Z90.49 ACQUIRED ABSENCE OF OTHER SPECIFIED PARTS OF DIGESTIVE TRACT: Chronic | ICD-10-CM

## 2020-03-23 DIAGNOSIS — Z98.890 OTHER SPECIFIED POSTPROCEDURAL STATES: Chronic | ICD-10-CM

## 2020-03-23 DIAGNOSIS — C50.919 MALIGNANT NEOPLASM OF UNSPECIFIED SITE OF UNSPECIFIED FEMALE BREAST: ICD-10-CM

## 2020-03-23 DIAGNOSIS — M12.9 ARTHROPATHY, UNSPECIFIED: Chronic | ICD-10-CM

## 2020-03-23 DIAGNOSIS — Z90.13 ACQUIRED ABSENCE OF BILATERAL BREASTS AND NIPPLES: Chronic | ICD-10-CM

## 2020-03-27 ENCOUNTER — APPOINTMENT (OUTPATIENT)
Dept: HEMATOLOGY ONCOLOGY | Facility: CLINIC | Age: 60
End: 2020-03-27

## 2020-05-04 ENCOUNTER — APPOINTMENT (OUTPATIENT)
Dept: PLASTIC SURGERY | Facility: CLINIC | Age: 60
End: 2020-05-04

## 2020-05-15 ENCOUNTER — OUTPATIENT (OUTPATIENT)
Dept: OUTPATIENT SERVICES | Facility: HOSPITAL | Age: 60
LOS: 1 days | Discharge: HOME | End: 2020-05-15

## 2020-05-15 DIAGNOSIS — Z90.13 ACQUIRED ABSENCE OF BILATERAL BREASTS AND NIPPLES: Chronic | ICD-10-CM

## 2020-05-15 DIAGNOSIS — Z90.49 ACQUIRED ABSENCE OF OTHER SPECIFIED PARTS OF DIGESTIVE TRACT: Chronic | ICD-10-CM

## 2020-05-15 DIAGNOSIS — C50.919 MALIGNANT NEOPLASM OF UNSPECIFIED SITE OF UNSPECIFIED FEMALE BREAST: ICD-10-CM

## 2020-05-15 DIAGNOSIS — M12.9 ARTHROPATHY, UNSPECIFIED: Chronic | ICD-10-CM

## 2020-05-15 DIAGNOSIS — Z98.890 OTHER SPECIFIED POSTPROCEDURAL STATES: Chronic | ICD-10-CM

## 2020-05-20 ENCOUNTER — APPOINTMENT (OUTPATIENT)
Dept: HEMATOLOGY ONCOLOGY | Facility: CLINIC | Age: 60
End: 2020-05-20

## 2020-05-20 ENCOUNTER — APPOINTMENT (OUTPATIENT)
Dept: HEMATOLOGY ONCOLOGY | Facility: CLINIC | Age: 60
End: 2020-05-20
Payer: COMMERCIAL

## 2020-05-20 PROCEDURE — 99214 OFFICE O/P EST MOD 30 MIN: CPT | Mod: 95

## 2020-05-20 NOTE — CONSULT LETTER
[Dear  ___] : Dear  [unfilled], [Courtesy Letter:] : I had the pleasure of seeing your patient, [unfilled], in my office today. [Please see my note below.] : Please see my note below. [Consult Closing:] : Thank you very much for allowing me to participate in the care of this patient.  If you have any questions, please do not hesitate to contact me. [Sincerely,] : Sincerely, [FreeTextEntry3] : Mello Parr MD\par Attending\par [facility]\par  [FreeTextEntry2] : Davy Disla MD\par [address] [place] [address]\[place] [address] [DrBruno  ___] : Dr. NICKERSON

## 2020-05-20 NOTE — REVIEW OF SYSTEMS
[Joint Pain] : joint pain [Joint Stiffness] : no joint stiffness [Muscle Pain] : no muscle pain [Muscle Weakness] : no muscle weakness [Negative] : Allergic/Immunologic [FreeTextEntry9] : improvement of thumb joint pain: able to bend thumbs without trouble or too much discomfort

## 2020-05-20 NOTE — HISTORY OF PRESENT ILLNESS
[Medical Office: ([facility])___] : at the medical office located in  [Home] : at home, [unfilled] , at the time of the visit. [Verbal consent obtained from patient] : the patient, [unfilled] [Disease: _____________________] : Disease: [unfilled] [T: ___] : T[unfilled] [N: ___] : N[unfilled] [AJCC Stage: ____] : AJCC Stage: [unfilled] [de-identified] : Age 59: left breast cancer \par Screening mammogram on 12/16/2018 showed focal asymmetry in the posterior central lateral left breast and additional imaging showing 7 mm spiculated mass at the left 5:00 area. She had left breast biopsy which showed invasive mammary carcinoma with lobular features ER >90%, [place] 24.5%, Wky4kks negative and LCIS. MRI of the breast showed biopsy proven nonmass enhancement along with left breast 4:00 enhancement and 9:00 enhancement. She had MRI guided biopsy of the L 4:00 area that showed mild proliferative fibrocystic changes and 9:00 area that showed LCIS and fibrocystic changes. On 3/27/19, she underwent R prophylactic mastectomy with SLN biopsy and L mastectomy and sentinel lymph node biopsy with Dr Disla. The pathology showed R breast LCIS and L left mastectomy showed invasive lobular carcinoma and extensive LCIS classic type with extensive ductal extension and negative sentinel lymph nodes. OncotypeDx score was 10. She had MEEK flap reconstruction with Dr Mcclain. She had issues with diarrhea and hair loss with anastrozole and was switched to exemestane. She developed worsening arthralgias from the exemestane: unable to open and abduct her thumbs only improved by cortisone injection.  [de-identified] : invasive lobular carcinoma ER >90%, [place] 24.5%, Icx8kih negative  [de-identified] : exemestane 7/26/19 to present \par [facility] 11/13/19  [de-identified] : She has improved arthralgias since she had cortisone injections by her rheumatologist. She was off the exemestane and concerned about not taking medication for breast cancer for last 2 months. She is willing to try another AI: feels she tolerated anastrozole except for hair loss but the exemestane was not tolerated. She has been working from home since January. She had reconstruction revision done and has some swelling over area. She is awaiting for follow up with Dr Mcclain.

## 2020-05-20 NOTE — REASON FOR VISIT
[Follow-Up Visit] : a follow-up [FreeTextEntry2] : follow up for breast cancer off AI therapy due to side effects

## 2020-05-20 NOTE — ASSESSMENT
[FreeTextEntry1] : She is a 60 y/o  F with Stage IA left breast cancer s/p bilateral mastectomy with sentinel lymph node biopsy. She had been on anastrozole and exemestane thus far with different side effects. We reviewed rationale for endocrine therapy and potential side effects from estrogen blockade. We reviewed options: tamoxifen versus letrozole. We reviewed side effects of both could be arthralgias but only way to see how she will tolerate is to try medication. We reviewed the potential SE of tamoxifen and less than 1% risk of thrombosis and uterine cancer. Currently, she is willing to try letrozole MWF x 4 weeks and we will touch base June 17th. If she is tolerating, we will slowly titrate up medication.

## 2020-07-09 ENCOUNTER — APPOINTMENT (OUTPATIENT)
Dept: PLASTIC SURGERY | Facility: CLINIC | Age: 60
End: 2020-07-09
Payer: COMMERCIAL

## 2020-07-09 VITALS
DIASTOLIC BLOOD PRESSURE: 103 MMHG | TEMPERATURE: 98 F | OXYGEN SATURATION: 94 % | HEART RATE: 74 BPM | SYSTOLIC BLOOD PRESSURE: 141 MMHG

## 2020-07-09 PROCEDURE — 99214 OFFICE O/P EST MOD 30 MIN: CPT

## 2020-07-16 ENCOUNTER — OUTPATIENT (OUTPATIENT)
Dept: OUTPATIENT SERVICES | Facility: HOSPITAL | Age: 60
LOS: 1 days | Discharge: HOME | End: 2020-07-16

## 2020-07-16 ENCOUNTER — APPOINTMENT (OUTPATIENT)
Dept: HEMATOLOGY ONCOLOGY | Facility: CLINIC | Age: 60
End: 2020-07-16
Payer: COMMERCIAL

## 2020-07-16 DIAGNOSIS — C50.919 MALIGNANT NEOPLASM OF UNSPECIFIED SITE OF UNSPECIFIED FEMALE BREAST: ICD-10-CM

## 2020-07-16 DIAGNOSIS — Z90.13 ACQUIRED ABSENCE OF BILATERAL BREASTS AND NIPPLES: Chronic | ICD-10-CM

## 2020-07-16 DIAGNOSIS — Z98.890 OTHER SPECIFIED POSTPROCEDURAL STATES: Chronic | ICD-10-CM

## 2020-07-16 DIAGNOSIS — Z90.49 ACQUIRED ABSENCE OF OTHER SPECIFIED PARTS OF DIGESTIVE TRACT: Chronic | ICD-10-CM

## 2020-07-16 DIAGNOSIS — M12.9 ARTHROPATHY, UNSPECIFIED: Chronic | ICD-10-CM

## 2020-07-16 PROCEDURE — 99213 OFFICE O/P EST LOW 20 MIN: CPT | Mod: 95

## 2020-07-16 RX ORDER — CEPHALEXIN 500 MG/1
500 CAPSULE ORAL
Qty: 10 | Refills: 0 | Status: DISCONTINUED | COMMUNITY
Start: 2020-02-18 | End: 2020-07-16

## 2020-07-16 NOTE — PHYSICAL EXAM
[Fully active, able to carry on all pre-disease performance without restriction] : Status 0 - Fully active, able to carry on all pre-disease performance without restriction [Normal] : affect appropriate [de-identified] : normal respiratory effort, able to speak full sentences without gasping or stopping

## 2020-07-16 NOTE — ASSESSMENT
[FreeTextEntry1] : She is a 59 y/o  F with Stage IA left breast cancer s/p bilateral mastectomy with sentinel lymph node biopsy. She had been on anastrozole and exemestane with intolerable side effects. She currently is on letrozole MWF and will try minoxidil for hair thinning. She will then start titrating letrozole to add Sa and Sun end of July after starting minoxidil. She understands if any worsening side effects, she will call and then we will consider tamoxifen. Next follow up in August.

## 2020-07-16 NOTE — REVIEW OF SYSTEMS
[Skin Rash] : no skin rash [Skin Wound] : no skin wound [de-identified] : acne and hair thinning, increased sweating  [Negative] : Allergic/Immunologic

## 2020-07-16 NOTE — HISTORY OF PRESENT ILLNESS
[Disease: _____________________] : Disease: [unfilled] [T: ___] : T[unfilled] [N: ___] : N[unfilled] [AJCC Stage: ____] : AJCC Stage: [unfilled] [Home] : at home, [unfilled] , at the time of the visit. [Medical Office: ([facility])___] : at the medical office located in  [Verbal consent obtained from patient] : the patient, [unfilled] [de-identified] : Age 59: left breast cancer \par Screening mammogram on 12/16/2018 showed focal asymmetry in the posterior central lateral left breast and additional imaging showing 7 mm spiculated mass at the left 5:00 area. She had left breast biopsy which showed invasive mammary carcinoma with lobular features ER >90%, [place] 24.5%, Sid2szf negative and LCIS. MRI of the breast showed biopsy proven nonmass enhancement along with left breast 4:00 enhancement and 9:00 enhancement. She had MRI guided biopsy of the L 4:00 area that showed mild proliferative fibrocystic changes and 9:00 area that showed LCIS and fibrocystic changes. On 3/27/19, she underwent R prophylactic mastectomy with SLN biopsy and L mastectomy and sentinel lymph node biopsy with Dr Disla. The pathology showed R breast LCIS and L left mastectomy showed invasive lobular carcinoma and extensive LCIS classic type with extensive ductal extension and negative sentinel lymph nodes. OncotypeDx score was 10. She had MEEK flap reconstruction with Dr Mcclain. She had issues with diarrhea and hair loss with anastrozole and was switched to exemestane. She developed worsening arthralgias from the exemestane: unable to open and abduct her thumbs only improved by cortisone injection. She started trial of letrozole on 5/2020.   [de-identified] : exemestane 7/26/19 to 1/2020 stopped due to arthralgias\par [facility] 11/13/19 \par letrozole 5/2020 to present  [de-identified] : invasive lobular carcinoma ER >90%, [place] 24.5%, Qex0nqo negative  [de-identified] : Due to coronavirus pandemic, telehealth visit made to decrease patient exposure. She consented to telehealth visit. She has been taking letrozole MWF. The arthralgias have not resumed but does notice crunching sensation when she bends one of her thumbs but no pain and has full ROM. She still has hair thinning and will be trying minoxidil for women. She is willing to continue with letrozole. She has noticed more sweating with increased AMY along with acne over the face. She has been using facial wash which has been helping. \par

## 2020-08-04 ENCOUNTER — APPOINTMENT (OUTPATIENT)
Dept: BARIATRICS | Facility: CLINIC | Age: 60
End: 2020-08-04
Payer: COMMERCIAL

## 2020-08-04 ENCOUNTER — APPOINTMENT (OUTPATIENT)
Dept: BARIATRICS | Facility: CLINIC | Age: 60
End: 2020-08-04

## 2020-08-04 VITALS
BODY MASS INDEX: 45.34 KG/M2 | HEART RATE: 82 BPM | HEIGHT: 62 IN | OXYGEN SATURATION: 98 % | DIASTOLIC BLOOD PRESSURE: 80 MMHG | SYSTOLIC BLOOD PRESSURE: 140 MMHG | WEIGHT: 246.38 LBS

## 2020-08-04 PROCEDURE — 99204 OFFICE O/P NEW MOD 45 MIN: CPT

## 2020-08-04 RX ORDER — EXEMESTANE 25 MG/1
25 TABLET, FILM COATED ORAL
Qty: 90 | Refills: 1 | Status: DISCONTINUED | COMMUNITY
Start: 2019-07-26 | End: 2020-08-04

## 2020-08-04 RX ORDER — OXYCODONE AND ACETAMINOPHEN 5; 325 MG/1; MG/1
5-325 TABLET ORAL
Qty: 30 | Refills: 0 | Status: DISCONTINUED | COMMUNITY
Start: 2019-03-22 | End: 2020-08-04

## 2020-08-04 RX ORDER — IBUPROFEN 800 MG/1
800 TABLET, FILM COATED ORAL 3 TIMES DAILY
Qty: 20 | Refills: 0 | Status: DISCONTINUED | COMMUNITY
Start: 2020-02-07 | End: 2020-08-04

## 2020-08-04 RX ORDER — ASPIRIN 81 MG/1
81 TABLET ORAL
Qty: 21 | Refills: 0 | Status: DISCONTINUED | COMMUNITY
Start: 2019-03-22 | End: 2020-08-04

## 2020-08-04 RX ORDER — SODIUM HYPOCHLORITE 2.5 MG/ML
0.25 SOLUTION TOPICAL
Qty: 1 | Refills: 1 | Status: DISCONTINUED | COMMUNITY
Start: 2019-04-18 | End: 2020-08-04

## 2020-08-04 RX ORDER — SODIUM HYPOCHLORITE 2.5 MG/ML
0.25 SOLUTION TOPICAL
Qty: 1 | Refills: 2 | Status: DISCONTINUED | COMMUNITY
Start: 2019-04-29 | End: 2020-08-04

## 2020-08-04 RX ORDER — OXYCODONE AND ACETAMINOPHEN 5; 325 MG/1; MG/1
5-325 TABLET ORAL
Qty: 15 | Refills: 0 | Status: DISCONTINUED | COMMUNITY
Start: 2020-02-07 | End: 2020-08-04

## 2020-08-04 RX ORDER — OXYCODONE AND ACETAMINOPHEN 5; 325 MG/1; MG/1
5-325 TABLET ORAL
Qty: 20 | Refills: 0 | Status: DISCONTINUED | COMMUNITY
Start: 2019-05-08 | End: 2020-08-04

## 2020-08-04 RX ORDER — OXYCODONE AND ACETAMINOPHEN 5; 325 MG/1; MG/1
5-325 TABLET ORAL
Qty: 20 | Refills: 0 | Status: DISCONTINUED | COMMUNITY
Start: 2020-01-30 | End: 2020-08-04

## 2020-08-04 RX ORDER — OXYCODONE AND ACETAMINOPHEN 5; 325 MG/1; MG/1
5-325 TABLET ORAL
Qty: 20 | Refills: 0 | Status: DISCONTINUED | COMMUNITY
Start: 2019-04-04 | End: 2020-08-04

## 2020-08-04 NOTE — REVIEW OF SYSTEMS
[Negative] : Endocrine [Fever] : no fever [Chills] : no chills [Fatigue] : no fatigue [Night Sweats] : no night sweats [Recent Change In Weight] : ~T no recent weight change [Eye Pain] : no eye pain [Vision Problems] : no vision problems [Red Eyes] : eyes not red [Hoarseness] : no hoarseness [Dysphagia] : no dysphagia [Odynophagia] : no odynophagia [Chest Pain] : no chest pain [Palpitations] : no palpitations [Leg Claudication] : no intermittent leg claudication [Shortness Of Breath] : no shortness of breath [Lower Ext Edema] : no lower extremity edema [Wheezing] : no wheezing [Cough] : no cough [Abdominal Pain] : no abdominal pain [Constipation] : no constipation [Vomiting] : no vomiting [Reflux/Heartburn] : no reflux/ heartburn [Diarrhea] : no diarrhea

## 2020-08-04 NOTE — HISTORY OF PRESENT ILLNESS
[Procedure: ___] : Procedure performed: [unfilled]  [Date of Surgery: ___] : Date of Surgery:   [unfilled] [Surgeon Name:   ___] : Surgeon Name: Dr. NICKERSON [Pre-Op Weight ___] : Pre-op weight was [unfilled] lbs [de-identified] : 60 year old female s/p laparoscopic gastric bypass in 2004 with Dr. Desir at [facility] presents today to weight loss options. Patients preop weight was 268 lb and lowest weight was 225 lb that was attained within a year of surgery. She then slowly regained the weight. Patient hasn’t had recent follow up with Bariatric Surgeon. About 10 year ago she had upper EGD with Dr. Giovanni Stover, but never received results. Based on brief diet history, she has 2-3 meals a day and tends to miss lunch. Patient has smoothie with protein powder for breakfast and protein for dinner. She snacks mostly after dinner on fruit, chips or chocolate. Patient drinks water and iced tea with equal. She takes multivitamins, iron, Vitamin C, calcium, Vit D 3 1000 IU, biotin, zinc and tumeric. She denies acid reflux symptoms, nausea, vomiting, diarrhea and constipation. Since diagnosis of breast cancer in 2019, she has done limited exercise. She walks 3-4 K steps daily and prior to diagnosis was more active and would walk over 10 K steps daily.

## 2020-08-04 NOTE — ASSESSMENT
[FreeTextEntry1] : 60 year old female s/p laparoscopic gastric bypass in 2004 with Dr. Desir at [facility] presents today to weight loss options. Patient reports that she is most bothered by protruding stomach after breast reconstruction with MEEK flaps.  Upon review of patient diet history she has improvements to make, should follow a low fat, low carbohydrate and protein focused diet, with three meals a day. Avoid snacking especially on calorie dense foods. All liquids with zero calories. Patient was encouraged to food journal and meet with nutritionist. For exercise, she was advised to increase amount of walking to reach goal of 10,000 steps daily and to incorporate strength exercises\par \par Nutrition and exercise counseling provided.\par Continue vitamins\par LABS\par Food journal and nutritionist appointment\par Follow up in 2 months\par Call with any questions or concerns\par

## 2020-08-04 NOTE — PHYSICAL EXAM
[Obese, well nourished, in no acute distress] : obese, well nourished, in no acute distress [Normal] : grossly intact [de-identified] : EOMI, anicteric  [de-identified] : obese, soft, nontender, no evidence of hernia.

## 2020-08-14 ENCOUNTER — APPOINTMENT (OUTPATIENT)
Dept: HEMATOLOGY ONCOLOGY | Facility: CLINIC | Age: 60
End: 2020-08-14
Payer: COMMERCIAL

## 2020-08-14 PROCEDURE — 99212 OFFICE O/P EST SF 10 MIN: CPT | Mod: 95

## 2020-08-14 NOTE — PHYSICAL EXAM
[Fully active, able to carry on all pre-disease performance without restriction] : Status 0 - Fully active, able to carry on all pre-disease performance without restriction [Normal] : affect appropriate [de-identified] : normal respiratory effort; able to speak full sentences without gasping/ stopping

## 2020-08-14 NOTE — HISTORY OF PRESENT ILLNESS
[Disease: _____________________] : Disease: [unfilled] [T: ___] : T[unfilled] [N: ___] : N[unfilled] [AJCC Stage: ____] : AJCC Stage: [unfilled] [Home] : at home, [unfilled] , at the time of the visit. [Medical Office: ([facility])___] : at the medical office located in  [Verbal consent obtained from patient] : the patient, [unfilled] [de-identified] : Age 59: left breast cancer \par Screening mammogram on 12/16/2018 showed focal asymmetry in the posterior central lateral left breast and additional imaging showing 7 mm spiculated mass at the left 5:00 area. She had left breast biopsy which showed invasive mammary carcinoma with lobular features ER >90%, [place] 24.5%, Nfi9jno negative and LCIS. MRI of the breast showed biopsy proven nonmass enhancement along with left breast 4:00 enhancement and 9:00 enhancement. She had MRI guided biopsy of the L 4:00 area that showed mild proliferative fibrocystic changes and 9:00 area that showed LCIS and fibrocystic changes. On 3/27/19, she underwent R prophylactic mastectomy with SLN biopsy and L mastectomy and sentinel lymph node biopsy with Dr Disla. The pathology showed R breast LCIS and L left mastectomy showed invasive lobular carcinoma and extensive LCIS classic type with extensive ductal extension and negative sentinel lymph nodes. OncotypeDx score was 10. She had MEEK flap reconstruction with Dr Mcclain. She had issues with diarrhea and hair loss with anastrozole and was switched to exemestane. She developed worsening arthralgias from the exemestane: unable to open and abduct her thumbs only improved by cortisone injection. She started trial of letrozole on 5/2020.   [de-identified] : invasive lobular carcinoma ER >90%, [place] 24.5%, Tbm5nfm negative  [de-identified] : exemestane 7/26/19 to 1/2020 stopped due to arthralgias\par [facility] 11/13/19 \par letrozole 5/2020 to present  [de-identified] : Due to coronavirus pandemic, telehealth visit made to decrease patient exposure. She consented to telehealth visit. She still has been tolerating letrozole [id number] with right thumb cracking with bending but still able to bend thumbs. She has started minoxidil few weeks ago and has still been losing hair. She still has full head of hair and willing to continue with letrozole. No new medications. \par

## 2020-08-14 NOTE — ASSESSMENT
[FreeTextEntry1] : She is a 59 y/o  F with Stage IA left breast cancer s/p bilateral mastectomy with sentinel lymph node biopsy. She had been on anastrozole and exemestane with intolerable side effects. She currently is on letrozole and will titrate to daily dosing next week. She will continue with minoxidil for hair loss. If any worsening arthralgias, she will let us know. Next follow up in November.

## 2020-08-14 NOTE — REVIEW OF SYSTEMS
[Joint Pain] : no joint pain [Joint Stiffness] : no joint stiffness [Muscle Pain] : no muscle pain [Muscle Weakness] : no muscle weakness [Skin Rash] : no skin rash [Skin Wound] : no skin wound [Negative] : Allergic/Immunologic [FreeTextEntry9] : R thumb joint cracking  [de-identified] : hair loss

## 2020-08-24 LAB
25(OH)D3 SERPL-MCNC: 47 NG/ML
ALBUMIN SERPL ELPH-MCNC: 4 G/DL
ALP BLD-CCNC: 195 U/L
ALT SERPL-CCNC: 43 U/L
ANION GAP SERPL CALC-SCNC: 15 MMOL/L
AST SERPL-CCNC: 20 U/L
BASOPHILS # BLD AUTO: 0.06 K/UL
BASOPHILS NFR BLD AUTO: 0.7 %
BILIRUB SERPL-MCNC: 0.3 MG/DL
BUN SERPL-MCNC: 18 MG/DL
CALCIUM SERPL-MCNC: 9.5 MG/DL
CHLORIDE SERPL-SCNC: 108 MMOL/L
CHOLEST SERPL-MCNC: 175 MG/DL
CHOLEST/HDLC SERPL: 2.7 RATIO
CO2 SERPL-SCNC: 25 MMOL/L
CREAT SERPL-MCNC: 1.03 MG/DL
EOSINOPHIL # BLD AUTO: 0.15 K/UL
EOSINOPHIL NFR BLD AUTO: 1.7 %
FERRITIN SERPL-MCNC: 42 NG/ML
FOLATE SERPL-MCNC: 15.1 NG/ML
GLUCOSE SERPL-MCNC: 80 MG/DL
HCT VFR BLD CALC: 44.8 %
HDLC SERPL-MCNC: 64 MG/DL
HGB BLD-MCNC: 14 G/DL
IMM GRANULOCYTES NFR BLD AUTO: 0.6 %
INR PPP: 0.93 RATIO
IRON SATN MFR SERPL: 20 %
IRON SERPL-MCNC: 84 UG/DL
LDLC SERPL CALC-MCNC: 84 MG/DL
LYMPHOCYTES # BLD AUTO: 2.31 K/UL
LYMPHOCYTES NFR BLD AUTO: 25.6 %
MAN DIFF?: NORMAL
MCHC RBC-ENTMCNC: 28.6 PG
MCHC RBC-ENTMCNC: 31.3 GM/DL
MCV RBC AUTO: 91.6 FL
MONOCYTES # BLD AUTO: 0.8 K/UL
MONOCYTES NFR BLD AUTO: 8.9 %
NEUTROPHILS # BLD AUTO: 5.65 K/UL
NEUTROPHILS NFR BLD AUTO: 62.5 %
PLATELET # BLD AUTO: 272 K/UL
POTASSIUM SERPL-SCNC: 4.5 MMOL/L
PROT SERPL-MCNC: 6.3 G/DL
PT BLD: 11.1 SEC
RBC # BLD: 4.89 M/UL
RBC # FLD: 15.1 %
SODIUM SERPL-SCNC: 147 MMOL/L
TIBC SERPL-MCNC: 411 UG/DL
TRIGL SERPL-MCNC: 132 MG/DL
TSH SERPL-ACNC: 1.58 UIU/ML
UIBC SERPL-MCNC: 327 UG/DL
VIT A SERPL-MCNC: 62.4 UG/DL
VIT B1 SERPL-MCNC: 209.7 NMOL/L
VIT B12 SERPL-MCNC: 306 PG/ML
VIT B6 SERPL-MCNC: 14.7 UG/L
WBC # FLD AUTO: 9.02 K/UL

## 2020-10-21 ENCOUNTER — APPOINTMENT (OUTPATIENT)
Dept: SURGICAL ONCOLOGY | Facility: CLINIC | Age: 60
End: 2020-10-21
Payer: COMMERCIAL

## 2020-10-26 ENCOUNTER — APPOINTMENT (OUTPATIENT)
Dept: PLASTIC SURGERY | Facility: CLINIC | Age: 60
End: 2020-10-26
Payer: COMMERCIAL

## 2020-10-26 VITALS
WEIGHT: 246 LBS | DIASTOLIC BLOOD PRESSURE: 96 MMHG | HEIGHT: 62 IN | OXYGEN SATURATION: 95 % | BODY MASS INDEX: 45.27 KG/M2 | TEMPERATURE: 98.3 F | HEART RATE: 77 BPM | SYSTOLIC BLOOD PRESSURE: 136 MMHG

## 2020-10-26 PROCEDURE — 99024 POSTOP FOLLOW-UP VISIT: CPT

## 2020-10-29 NOTE — PHYSICAL EXAM
[NI] : Normal [de-identified] : Breast flaps soft healed well [de-identified] : Abd healed well

## 2020-10-29 NOTE — HISTORY OF PRESENT ILLNESS
[FreeTextEntry1] : Pt s/p bilateral mastectomy with MEEK reconstruction.  Pt doing well.  Here for follow up.

## 2020-10-30 ENCOUNTER — OUTPATIENT (OUTPATIENT)
Dept: OUTPATIENT SERVICES | Facility: HOSPITAL | Age: 60
LOS: 1 days | Discharge: HOME | End: 2020-10-30

## 2020-10-30 DIAGNOSIS — Z90.49 ACQUIRED ABSENCE OF OTHER SPECIFIED PARTS OF DIGESTIVE TRACT: Chronic | ICD-10-CM

## 2020-10-30 DIAGNOSIS — M12.9 ARTHROPATHY, UNSPECIFIED: Chronic | ICD-10-CM

## 2020-10-30 DIAGNOSIS — Z98.890 OTHER SPECIFIED POSTPROCEDURAL STATES: Chronic | ICD-10-CM

## 2020-10-30 DIAGNOSIS — C50.919 MALIGNANT NEOPLASM OF UNSPECIFIED SITE OF UNSPECIFIED FEMALE BREAST: ICD-10-CM

## 2020-10-30 DIAGNOSIS — Z90.13 ACQUIRED ABSENCE OF BILATERAL BREASTS AND NIPPLES: Chronic | ICD-10-CM

## 2020-11-03 ENCOUNTER — APPOINTMENT (OUTPATIENT)
Dept: SURGICAL ONCOLOGY | Facility: CLINIC | Age: 60
End: 2020-11-03
Payer: COMMERCIAL

## 2020-11-03 VITALS
HEART RATE: 86 BPM | OXYGEN SATURATION: 95 % | SYSTOLIC BLOOD PRESSURE: 146 MMHG | WEIGHT: 245 LBS | RESPIRATION RATE: 15 BRPM | HEIGHT: 62 IN | BODY MASS INDEX: 45.08 KG/M2 | DIASTOLIC BLOOD PRESSURE: 101 MMHG

## 2020-11-03 PROCEDURE — 99214 OFFICE O/P EST MOD 30 MIN: CPT

## 2020-11-03 PROCEDURE — 99072 ADDL SUPL MATRL&STAF TM PHE: CPT

## 2020-11-03 NOTE — PHYSICAL EXAM
[FreeTextEntry1] : ITalia was present for the physical exam.  [de-identified] : Normal S1, S2. Regular rate and rhythm. [de-identified] : Complete breast exam performed in supine and upright positions. s/p b/l mastectomy with MEEK reconstruction.  Fat necrosis right reconstructed breast 12:00.  No palpable nodules or  signs of local recurrence. [de-identified] : Clear breath sounds bilaterally, normal respiratory effort.

## 2020-11-03 NOTE — ADDENDUM
[FreeTextEntry1] : I, Talia Linton, acted soley as a scribe for Dr. Davy Disla on this date 11/03/2020.

## 2020-11-03 NOTE — HISTORY OF PRESENT ILLNESS
[de-identified] : 60 year-old female presents for her follow up exam.  She is status post bilateral mastectomy with bilateral MEEK reconstruction by Dr. Anmol Mcclain on 3/27/19.  Final path: 0.8cm invasive lobular carcinoma in the left breast with 7 negative LNs.  Right breast with LCIS and 5 negative LNs.  Recurrence score = 10.  She is currently taking exemestane daily under the guidance of Dr. Mello Parr (med/onc).\par \par Today the pt was w/o any complaints. Denies any chest wall discomfort. Denies constitutional symptoms\par

## 2020-11-11 ENCOUNTER — OUTPATIENT (OUTPATIENT)
Dept: OUTPATIENT SERVICES | Facility: HOSPITAL | Age: 60
LOS: 1 days | End: 2020-11-11
Payer: COMMERCIAL

## 2020-11-11 VITALS
HEART RATE: 74 BPM | OXYGEN SATURATION: 100 % | DIASTOLIC BLOOD PRESSURE: 82 MMHG | HEIGHT: 62 IN | TEMPERATURE: 98 F | WEIGHT: 243.83 LBS | SYSTOLIC BLOOD PRESSURE: 132 MMHG | RESPIRATION RATE: 15 BRPM

## 2020-11-11 DIAGNOSIS — Z90.13 ACQUIRED ABSENCE OF BILATERAL BREASTS AND NIPPLES: ICD-10-CM

## 2020-11-11 DIAGNOSIS — Z90.49 ACQUIRED ABSENCE OF OTHER SPECIFIED PARTS OF DIGESTIVE TRACT: Chronic | ICD-10-CM

## 2020-11-11 DIAGNOSIS — N65.1 DISPROPORTION OF RECONSTRUCTED BREAST: ICD-10-CM

## 2020-11-11 DIAGNOSIS — Z01.818 ENCOUNTER FOR OTHER PREPROCEDURAL EXAMINATION: ICD-10-CM

## 2020-11-11 DIAGNOSIS — Z98.890 OTHER SPECIFIED POSTPROCEDURAL STATES: Chronic | ICD-10-CM

## 2020-11-11 DIAGNOSIS — Z90.13 ACQUIRED ABSENCE OF BILATERAL BREASTS AND NIPPLES: Chronic | ICD-10-CM

## 2020-11-11 DIAGNOSIS — N65.0 DEFORMITY OF RECONSTRUCTED BREAST: ICD-10-CM

## 2020-11-11 DIAGNOSIS — Z42.1 ENCOUNTER FOR BREAST RECONSTRUCTION FOLLOWING MASTECTOMY: ICD-10-CM

## 2020-11-11 DIAGNOSIS — Z85.3 PERSONAL HISTORY OF MALIGNANT NEOPLASM OF BREAST: ICD-10-CM

## 2020-11-11 DIAGNOSIS — M12.9 ARTHROPATHY, UNSPECIFIED: Chronic | ICD-10-CM

## 2020-11-11 LAB
ALBUMIN SERPL ELPH-MCNC: 3.5 G/DL — SIGNIFICANT CHANGE UP (ref 3.3–5)
ALP SERPL-CCNC: 193 U/L — HIGH (ref 40–120)
ALT FLD-CCNC: 48 U/L — HIGH (ref 10–45)
ANION GAP SERPL CALC-SCNC: 10 MMOL/L — SIGNIFICANT CHANGE UP (ref 5–17)
AST SERPL-CCNC: 24 U/L — SIGNIFICANT CHANGE UP (ref 10–40)
BILIRUB SERPL-MCNC: 0.3 MG/DL — SIGNIFICANT CHANGE UP (ref 0.2–1.2)
BUN SERPL-MCNC: 12 MG/DL — SIGNIFICANT CHANGE UP (ref 7–23)
CALCIUM SERPL-MCNC: 9.4 MG/DL — SIGNIFICANT CHANGE UP (ref 8.4–10.5)
CHLORIDE SERPL-SCNC: 105 MMOL/L — SIGNIFICANT CHANGE UP (ref 96–108)
CO2 SERPL-SCNC: 26 MMOL/L — SIGNIFICANT CHANGE UP (ref 22–31)
CREAT SERPL-MCNC: 1.14 MG/DL — SIGNIFICANT CHANGE UP (ref 0.5–1.3)
GLUCOSE SERPL-MCNC: 103 MG/DL — HIGH (ref 70–99)
HCT VFR BLD CALC: 43.9 % — SIGNIFICANT CHANGE UP (ref 34.5–45)
HGB BLD-MCNC: 14.2 G/DL — SIGNIFICANT CHANGE UP (ref 11.5–15.5)
MCHC RBC-ENTMCNC: 29.4 PG — SIGNIFICANT CHANGE UP (ref 27–34)
MCHC RBC-ENTMCNC: 32.3 GM/DL — SIGNIFICANT CHANGE UP (ref 32–36)
MCV RBC AUTO: 90.9 FL — SIGNIFICANT CHANGE UP (ref 80–100)
NRBC # BLD: 0 /100 WBCS — SIGNIFICANT CHANGE UP (ref 0–0)
PLATELET # BLD AUTO: 269 K/UL — SIGNIFICANT CHANGE UP (ref 150–400)
POTASSIUM SERPL-MCNC: 4 MMOL/L — SIGNIFICANT CHANGE UP (ref 3.5–5.3)
POTASSIUM SERPL-SCNC: 4 MMOL/L — SIGNIFICANT CHANGE UP (ref 3.5–5.3)
PROT SERPL-MCNC: 7.6 G/DL — SIGNIFICANT CHANGE UP (ref 6–8.3)
RBC # BLD: 4.83 M/UL — SIGNIFICANT CHANGE UP (ref 3.8–5.2)
RBC # FLD: 13.1 % — SIGNIFICANT CHANGE UP (ref 10.3–14.5)
SODIUM SERPL-SCNC: 141 MMOL/L — SIGNIFICANT CHANGE UP (ref 135–145)
WBC # BLD: 8.09 K/UL — SIGNIFICANT CHANGE UP (ref 3.8–10.5)
WBC # FLD AUTO: 8.09 K/UL — SIGNIFICANT CHANGE UP (ref 3.8–10.5)

## 2020-11-11 PROCEDURE — 93010 ELECTROCARDIOGRAM REPORT: CPT | Mod: NC

## 2020-11-11 PROCEDURE — 36415 COLL VENOUS BLD VENIPUNCTURE: CPT

## 2020-11-11 PROCEDURE — G0463: CPT

## 2020-11-11 PROCEDURE — 93005 ELECTROCARDIOGRAM TRACING: CPT

## 2020-11-11 PROCEDURE — 85027 COMPLETE CBC AUTOMATED: CPT

## 2020-11-11 PROCEDURE — 80053 COMPREHEN METABOLIC PANEL: CPT

## 2020-11-11 RX ORDER — EXEMESTANE 25 MG/1
1 TABLET, SUGAR COATED ORAL
Qty: 0 | Refills: 0 | DISCHARGE

## 2020-11-11 RX ORDER — SODIUM CHLORIDE 9 MG/ML
1000 INJECTION, SOLUTION INTRAVENOUS
Refills: 0 | Status: DISCONTINUED | OUTPATIENT
Start: 2020-11-11 | End: 2020-11-25

## 2020-11-11 NOTE — H&P PST ADULT - HISTORY OF PRESENT ILLNESS
61 yo F for revision bilat breast reconstruction    bilat nipple areola reconstruction 59 yo F for revision bilat breast reconstruction    bilat nipple areola reconstruction  Hx  MEEK

## 2020-11-11 NOTE — H&P PST ADULT - NSICDXPASTMEDICALHX_GEN_ALL_CORE_FT
PAST MEDICAL HISTORY:  Anemia, unspecified type     Anxiety and depression     Asthma     Breast cancer left side in 2019    Encounter for breast reconstruction following mastectomy     History of gastric ulcer     History of melanoma removed from back.    History of shingles October 2018    Incisional hernia, without obstruction or gangrene     Morbid obesity     Obesity, unspecified obesity severity, unspecified obesity type     Sleep apnea, obstructive uses device at night    Uncomplicated asthma, unspecified asthma severity

## 2020-11-11 NOTE — H&P PST ADULT - ASSESSMENT
61 yo obese W F w hx MEEK for revision of bilat breast reconstruction   bilat nipple areola reconstruction     LAbs pending    COVID TBS

## 2020-11-12 PROBLEM — J45.909 UNSPECIFIED ASTHMA, UNCOMPLICATED: Chronic | Status: ACTIVE | Noted: 2020-11-11

## 2020-11-14 ENCOUNTER — APPOINTMENT (OUTPATIENT)
Dept: DISASTER EMERGENCY | Facility: CLINIC | Age: 60
End: 2020-11-14

## 2020-11-17 ENCOUNTER — APPOINTMENT (OUTPATIENT)
Dept: PLASTIC SURGERY | Facility: HOSPITAL | Age: 60
End: 2020-11-17

## 2020-11-20 ENCOUNTER — APPOINTMENT (OUTPATIENT)
Dept: DISASTER EMERGENCY | Facility: CLINIC | Age: 60
End: 2020-11-20

## 2020-11-20 ENCOUNTER — OUTPATIENT (OUTPATIENT)
Dept: OUTPATIENT SERVICES | Facility: HOSPITAL | Age: 60
LOS: 1 days | Discharge: HOME | End: 2020-11-20

## 2020-11-20 DIAGNOSIS — Z90.49 ACQUIRED ABSENCE OF OTHER SPECIFIED PARTS OF DIGESTIVE TRACT: Chronic | ICD-10-CM

## 2020-11-20 DIAGNOSIS — Z98.890 OTHER SPECIFIED POSTPROCEDURAL STATES: Chronic | ICD-10-CM

## 2020-11-20 DIAGNOSIS — Z90.13 ACQUIRED ABSENCE OF BILATERAL BREASTS AND NIPPLES: Chronic | ICD-10-CM

## 2020-11-20 DIAGNOSIS — C50.919 MALIGNANT NEOPLASM OF UNSPECIFIED SITE OF UNSPECIFIED FEMALE BREAST: ICD-10-CM

## 2020-11-20 DIAGNOSIS — M12.9 ARTHROPATHY, UNSPECIFIED: Chronic | ICD-10-CM

## 2020-11-20 RX ORDER — SODIUM CHLORIDE 9 MG/ML
1000 INJECTION, SOLUTION INTRAVENOUS
Refills: 0 | Status: DISCONTINUED | OUTPATIENT
Start: 2020-11-23 | End: 2020-11-23

## 2020-11-20 RX ORDER — FENTANYL CITRATE 50 UG/ML
50 INJECTION INTRAVENOUS
Refills: 0 | Status: DISCONTINUED | OUTPATIENT
Start: 2020-11-23 | End: 2020-11-23

## 2020-11-20 RX ORDER — ONDANSETRON 8 MG/1
4 TABLET, FILM COATED ORAL ONCE
Refills: 0 | Status: DISCONTINUED | OUTPATIENT
Start: 2020-11-23 | End: 2020-11-23

## 2020-11-21 LAB — SARS-COV-2 N GENE NPH QL NAA+PROBE: NOT DETECTED

## 2020-11-23 ENCOUNTER — APPOINTMENT (OUTPATIENT)
Dept: PLASTIC SURGERY | Facility: HOSPITAL | Age: 60
End: 2020-11-23
Payer: COMMERCIAL

## 2020-11-23 ENCOUNTER — OUTPATIENT (OUTPATIENT)
Dept: INPATIENT UNIT | Facility: HOSPITAL | Age: 60
LOS: 1 days | Discharge: HOME | End: 2020-11-23
Payer: COMMERCIAL

## 2020-11-23 VITALS
RESPIRATION RATE: 16 BRPM | TEMPERATURE: 98 F | SYSTOLIC BLOOD PRESSURE: 135 MMHG | DIASTOLIC BLOOD PRESSURE: 63 MMHG | HEART RATE: 91 BPM | OXYGEN SATURATION: 95 %

## 2020-11-23 VITALS
HEIGHT: 62 IN | RESPIRATION RATE: 16 BRPM | TEMPERATURE: 98 F | DIASTOLIC BLOOD PRESSURE: 84 MMHG | WEIGHT: 243.83 LBS | OXYGEN SATURATION: 100 % | SYSTOLIC BLOOD PRESSURE: 149 MMHG | HEART RATE: 71 BPM

## 2020-11-23 DIAGNOSIS — Z85.3 PERSONAL HISTORY OF MALIGNANT NEOPLASM OF BREAST: ICD-10-CM

## 2020-11-23 DIAGNOSIS — Z42.1 ENCOUNTER FOR BREAST RECONSTRUCTION FOLLOWING MASTECTOMY: ICD-10-CM

## 2020-11-23 DIAGNOSIS — Z90.49 ACQUIRED ABSENCE OF OTHER SPECIFIED PARTS OF DIGESTIVE TRACT: Chronic | ICD-10-CM

## 2020-11-23 DIAGNOSIS — Z90.13 ACQUIRED ABSENCE OF BILATERAL BREASTS AND NIPPLES: ICD-10-CM

## 2020-11-23 DIAGNOSIS — Z98.890 OTHER SPECIFIED POSTPROCEDURAL STATES: Chronic | ICD-10-CM

## 2020-11-23 DIAGNOSIS — N65.1 DISPROPORTION OF RECONSTRUCTED BREAST: ICD-10-CM

## 2020-11-23 DIAGNOSIS — M12.9 ARTHROPATHY, UNSPECIFIED: Chronic | ICD-10-CM

## 2020-11-23 DIAGNOSIS — Z90.13 ACQUIRED ABSENCE OF BILATERAL BREASTS AND NIPPLES: Chronic | ICD-10-CM

## 2020-11-23 DIAGNOSIS — N65.0 DEFORMITY OF RECONSTRUCTED BREAST: ICD-10-CM

## 2020-11-23 LAB
APTT BLD: 31.9 SEC — SIGNIFICANT CHANGE UP (ref 27.5–35.5)
INR BLD: 1.02 RATIO — SIGNIFICANT CHANGE UP (ref 0.88–1.16)
PROTHROM AB SERPL-ACNC: 11.8 SEC — SIGNIFICANT CHANGE UP (ref 10.6–13.6)

## 2020-11-23 PROCEDURE — 86900 BLOOD TYPING SEROLOGIC ABO: CPT

## 2020-11-23 PROCEDURE — 85730 THROMBOPLASTIN TIME PARTIAL: CPT

## 2020-11-23 PROCEDURE — 85610 PROTHROMBIN TIME: CPT

## 2020-11-23 PROCEDURE — 19380 REVJ RECONSTRUCTED BREAST: CPT | Mod: RT

## 2020-11-23 PROCEDURE — 86850 RBC ANTIBODY SCREEN: CPT

## 2020-11-23 PROCEDURE — C9290: CPT

## 2020-11-23 PROCEDURE — 36415 COLL VENOUS BLD VENIPUNCTURE: CPT

## 2020-11-23 PROCEDURE — 19350 NIPPLE/AREOLA RECONSTRUCTION: CPT | Mod: RT

## 2020-11-23 PROCEDURE — 86901 BLOOD TYPING SEROLOGIC RH(D): CPT

## 2020-11-23 PROCEDURE — 15200 FTH/GFT FR TRNK 20 SQ CM/<: CPT

## 2020-11-23 RX ORDER — OXYCODONE HYDROCHLORIDE 5 MG/1
10 TABLET ORAL ONCE
Refills: 0 | Status: DISCONTINUED | OUTPATIENT
Start: 2020-11-23 | End: 2020-11-23

## 2020-11-23 RX ADMIN — FENTANYL CITRATE 50 MICROGRAM(S): 50 INJECTION INTRAVENOUS at 16:46

## 2020-11-23 RX ADMIN — OXYCODONE HYDROCHLORIDE 10 MILLIGRAM(S): 5 TABLET ORAL at 16:44

## 2020-11-23 RX ADMIN — OXYCODONE HYDROCHLORIDE 10 MILLIGRAM(S): 5 TABLET ORAL at 17:41

## 2020-11-23 RX ADMIN — FENTANYL CITRATE 50 MICROGRAM(S): 50 INJECTION INTRAVENOUS at 17:41

## 2020-11-23 RX ADMIN — SODIUM CHLORIDE 75 MILLILITER(S): 9 INJECTION, SOLUTION INTRAVENOUS at 16:32

## 2020-11-23 NOTE — ASU DISCHARGE PLAN (ADULT/PEDIATRIC) - CARE PROVIDER_API CALL
Anmol Mcclain  OTOLARYNGOLOGY  [address] [place] [address], [address]  [place] [address]  Phone: [phone]  Fax: [phone]  Follow Up Time:

## 2020-11-23 NOTE — BRIEF OPERATIVE NOTE - NSICDXBRIEFPREOP_GEN_ALL_CORE_FT
PRE-OP DIAGNOSIS:  Acquired absence of bilateral breasts and nipples 23-Nov-2020 16:27:06  Cassy Jacobs

## 2020-11-23 NOTE — BRIEF OPERATIVE NOTE - OPERATION/FINDINGS
Revision of bilateral breast reconstruction with bilateral nipple reconstruction with CV flaps and bilateral areola reconstruction with FTSG.

## 2020-11-23 NOTE — ASU PATIENT PROFILE, ADULT - PSH
Arthropathy  right knee meniscus surgery  H/O bilateral mastectomy  with MEEK flap 3/2019  H/O gastric bypass  2004  S/P appendectomy  1971  S/P bunionectomy  Right x 1 - 1999. left x 2 with hardware- 2001, 2014

## 2020-11-23 NOTE — BRIEF OPERATIVE NOTE - NSICDXBRIEFPOSTOP_GEN_ALL_CORE_FT
POST-OP DIAGNOSIS:  Acquired absence of bilateral breasts and nipples 23-Nov-2020 16:27:19  Cassy Jacobs

## 2020-11-23 NOTE — ASU DISCHARGE PLAN (ADULT/PEDIATRIC) - PAIN MANAGEMENT
Percocet sent to patient's pharmacy prior to surgery/Prescriptions electronically submitted to pharmacy from doctor's office

## 2020-11-28 DIAGNOSIS — Z85.3 PERSONAL HISTORY OF MALIGNANT NEOPLASM OF BREAST: ICD-10-CM

## 2020-11-28 DIAGNOSIS — G47.33 OBSTRUCTIVE SLEEP APNEA (ADULT) (PEDIATRIC): ICD-10-CM

## 2020-11-28 DIAGNOSIS — Z90.13 ACQUIRED ABSENCE OF BILATERAL BREASTS AND NIPPLES: ICD-10-CM

## 2020-11-28 DIAGNOSIS — D64.9 ANEMIA, UNSPECIFIED: ICD-10-CM

## 2020-11-28 DIAGNOSIS — F32.9 MAJOR DEPRESSIVE DISORDER, SINGLE EPISODE, UNSPECIFIED: ICD-10-CM

## 2020-11-28 DIAGNOSIS — Z85.820 PERSONAL HISTORY OF MALIGNANT MELANOMA OF SKIN: ICD-10-CM

## 2020-11-28 DIAGNOSIS — F41.9 ANXIETY DISORDER, UNSPECIFIED: ICD-10-CM

## 2020-11-28 DIAGNOSIS — Z42.1 ENCOUNTER FOR BREAST RECONSTRUCTION FOLLOWING MASTECTOMY: ICD-10-CM

## 2020-11-28 DIAGNOSIS — Z98.84 BARIATRIC SURGERY STATUS: ICD-10-CM

## 2020-11-28 DIAGNOSIS — J45.909 UNSPECIFIED ASTHMA, UNCOMPLICATED: ICD-10-CM

## 2020-11-28 DIAGNOSIS — Z87.11 PERSONAL HISTORY OF PEPTIC ULCER DISEASE: ICD-10-CM

## 2020-11-28 DIAGNOSIS — Z87.891 PERSONAL HISTORY OF NICOTINE DEPENDENCE: ICD-10-CM

## 2020-11-28 DIAGNOSIS — E66.01 MORBID (SEVERE) OBESITY DUE TO EXCESS CALORIES: ICD-10-CM

## 2020-11-28 DIAGNOSIS — K43.2 INCISIONAL HERNIA WITHOUT OBSTRUCTION OR GANGRENE: ICD-10-CM

## 2020-11-28 DIAGNOSIS — Z86.19 PERSONAL HISTORY OF OTHER INFECTIOUS AND PARASITIC DISEASES: ICD-10-CM

## 2020-11-28 DIAGNOSIS — Z99.89 DEPENDENCE ON OTHER ENABLING MACHINES AND DEVICES: ICD-10-CM

## 2020-11-30 ENCOUNTER — APPOINTMENT (OUTPATIENT)
Dept: PLASTIC SURGERY | Facility: CLINIC | Age: 60
End: 2020-11-30
Payer: COMMERCIAL

## 2020-11-30 ENCOUNTER — APPOINTMENT (OUTPATIENT)
Dept: HEMATOLOGY ONCOLOGY | Facility: CLINIC | Age: 60
End: 2020-11-30
Payer: COMMERCIAL

## 2020-11-30 VITALS
HEART RATE: 79 BPM | SYSTOLIC BLOOD PRESSURE: 122 MMHG | TEMPERATURE: 97.4 F | RESPIRATION RATE: 16 BRPM | DIASTOLIC BLOOD PRESSURE: 77 MMHG | WEIGHT: 244.71 LBS | BODY MASS INDEX: 43.36 KG/M2 | HEIGHT: 62.99 IN | OXYGEN SATURATION: 99 %

## 2020-11-30 DIAGNOSIS — M25.542 PAIN IN JOINTS OF RIGHT HAND: ICD-10-CM

## 2020-11-30 DIAGNOSIS — M25.541 PAIN IN JOINTS OF RIGHT HAND: ICD-10-CM

## 2020-11-30 PROCEDURE — 99072 ADDL SUPL MATRL&STAF TM PHE: CPT

## 2020-11-30 PROCEDURE — 99024 POSTOP FOLLOW-UP VISIT: CPT

## 2020-11-30 PROCEDURE — 99214 OFFICE O/P EST MOD 30 MIN: CPT

## 2020-12-01 PROBLEM — M25.541 ARTHRALGIA OF BOTH HANDS: Status: ACTIVE | Noted: 2020-05-20

## 2020-12-01 NOTE — PHYSICAL EXAM
[Fully active, able to carry on all pre-disease performance without restriction] : Status 0 - Fully active, able to carry on all pre-disease performance without restriction [Normal] : affect appropriate [de-identified] : bilateral mastectomy with MEEK flap reconstruction; surgical tape over lower reconstruction with erythema  [de-identified] : able to make fist and abduct at DIP joint of the thumbs

## 2020-12-01 NOTE — ASSESSMENT
[FreeTextEntry1] : She is a 61 y/o  F with Stage IA left breast cancer s/p bilateral mastectomy with sentinel lymph node biopsy. She had been on anastrozole and exemestane with intolerable side effects. She has been on letrozole and continues to tolerate therapy with expected stiffness of the joints, hair thinning and hot flashes. We reviewed supportive measures and exercise for stiffness. We reviewed natural options to help decrease hot flashes but if becomes intolerable, we reviewed pharmacologic options: venlafaxine and gabapentin. She will continue with low fat diet and exercise. She had blood work done by PCP in August with LFTs WNL and Vitamin D. Questions answered to her satisfaction. Next visit in April.

## 2020-12-01 NOTE — HISTORY OF PRESENT ILLNESS
[Disease: _____________________] : Disease: [unfilled] [T: ___] : T[unfilled] [N: ___] : N[unfilled] [AJCC Stage: ____] : AJCC Stage: [unfilled] [de-identified] : Age 59: left breast cancer \par Screening mammogram on 12/16/2018 showed focal asymmetry in the posterior central lateral left breast and additional imaging showing 7 mm spiculated mass at the left 5:00 area. She had left breast biopsy which showed invasive mammary carcinoma with lobular features ER >90%, [place] 24.5%, Ewl4ooj negative and LCIS. MRI of the breast showed biopsy proven nonmass enhancement along with left breast 4:00 enhancement and 9:00 enhancement. She had MRI guided biopsy of the L 4:00 area that showed mild proliferative fibrocystic changes and 9:00 area that showed LCIS and fibrocystic changes. On 3/27/19, she underwent R prophylactic mastectomy with SLN biopsy and L mastectomy and sentinel lymph node biopsy with Dr Disla. The pathology showed R breast LCIS and L left mastectomy showed invasive lobular carcinoma and extensive LCIS classic type with extensive ductal extension and negative sentinel lymph nodes. OncotypeDx score was 10. She had MEEK flap reconstruction with Dr Mcclain. She had issues with diarrhea and hair loss with anastrozole and was switched to exemestane. She developed worsening arthralgias from the exemestane: unable to open and abduct her thumbs only improved by cortisone injection. She started trial of letrozole on 5/2020.   [de-identified] : invasive lobular carcinoma ER >90%, [place] 24.5%, Hys9iip negative  [de-identified] : exemestane 7/26/19 to 1/2020 stopped due to arthralgias\par [facility] 11/13/19 \par letrozole 5/2020 to present  [de-identified] : Stiffness over the thumbs present but feels she is able to do her usual work activities and feels tolerable. She has been taking biotin and minoxidil to decrease hair shedding on letrozole and feels that has stabilized. Currently feels hot flashes affecting QOL. She has not tried any supportive measures but prefers to have more natural measures. No new medications. She had reconstruction revision done and will have stitches removed this Friday. Tolerated revision well; has mild rash from tape over surgical site.

## 2020-12-01 NOTE — REVIEW OF SYSTEMS
No Vaccines Administered. [Joint Pain] : no joint pain [Joint Stiffness] : joint stiffness [Muscle Pain] : no muscle pain [Skin Rash] : no skin rash [Skin Wound] : no skin wound [Proptosis] : no proptosis [Hot Flashes] : hot flashes [Muscle Weakness] : no muscle weakness [Deepening Of The Voice] : no deepening of the voice [Negative] : Allergic/Immunologic [de-identified] : hair loss stabilized

## 2020-12-04 ENCOUNTER — APPOINTMENT (OUTPATIENT)
Dept: PLASTIC SURGERY | Facility: CLINIC | Age: 60
End: 2020-12-04
Payer: COMMERCIAL

## 2020-12-04 VITALS
SYSTOLIC BLOOD PRESSURE: 139 MMHG | HEIGHT: 62 IN | BODY MASS INDEX: 44.16 KG/M2 | DIASTOLIC BLOOD PRESSURE: 84 MMHG | OXYGEN SATURATION: 94 % | HEART RATE: 97 BPM | WEIGHT: 240 LBS | TEMPERATURE: 97.8 F

## 2020-12-04 PROCEDURE — 99024 POSTOP FOLLOW-UP VISIT: CPT

## 2020-12-09 ENCOUNTER — APPOINTMENT (OUTPATIENT)
Dept: PLASTIC SURGERY | Facility: CLINIC | Age: 60
End: 2020-12-09
Payer: SELF-PAY

## 2020-12-09 VITALS — WEIGHT: 239 LBS | BODY MASS INDEX: 43.98 KG/M2 | HEIGHT: 62 IN | TEMPERATURE: 97.7 F

## 2020-12-09 PROCEDURE — 99024 POSTOP FOLLOW-UP VISIT: CPT

## 2020-12-23 ENCOUNTER — APPOINTMENT (OUTPATIENT)
Dept: PLASTIC SURGERY | Facility: CLINIC | Age: 60
End: 2020-12-23
Payer: SELF-PAY

## 2020-12-23 VITALS
HEIGHT: 63 IN | WEIGHT: 239 LBS | SYSTOLIC BLOOD PRESSURE: 128 MMHG | OXYGEN SATURATION: 84 % | BODY MASS INDEX: 42.35 KG/M2 | DIASTOLIC BLOOD PRESSURE: 78 MMHG | TEMPERATURE: 98.3 F

## 2020-12-23 PROCEDURE — 99024 POSTOP FOLLOW-UP VISIT: CPT

## 2021-01-18 ENCOUNTER — APPOINTMENT (OUTPATIENT)
Dept: DISASTER EMERGENCY | Facility: CLINIC | Age: 61
End: 2021-01-18

## 2021-01-19 LAB — SARS-COV-2 N GENE NPH QL NAA+PROBE: NOT DETECTED

## 2021-01-22 ENCOUNTER — APPOINTMENT (OUTPATIENT)
Dept: DISASTER EMERGENCY | Facility: CLINIC | Age: 61
End: 2021-01-22

## 2021-01-24 LAB — SARS-COV-2 N GENE NPH QL NAA+PROBE: NOT DETECTED

## 2021-02-13 NOTE — ED ADULT TRIAGE NOTE - SOURCE OF INFORMATION
Patient pt presents today c/o vertigo similar to prior episode of vertigo treated with meclizine. labs and cth unremarkable. neuro exam unremarkable. symptoms resolved after meclizine. pt requesting discharge. will d/c with rx for meclizine and pmd follow up

## 2021-02-22 ENCOUNTER — APPOINTMENT (OUTPATIENT)
Dept: PLASTIC SURGERY | Facility: CLINIC | Age: 61
End: 2021-02-22
Payer: COMMERCIAL

## 2021-02-22 VITALS
OXYGEN SATURATION: 98 % | WEIGHT: 239 LBS | DIASTOLIC BLOOD PRESSURE: 89 MMHG | TEMPERATURE: 97.3 F | HEIGHT: 63 IN | BODY MASS INDEX: 42.35 KG/M2 | HEART RATE: 78 BPM | SYSTOLIC BLOOD PRESSURE: 151 MMHG

## 2021-02-22 PROCEDURE — 99212 OFFICE O/P EST SF 10 MIN: CPT

## 2021-04-13 ENCOUNTER — APPOINTMENT (OUTPATIENT)
Dept: PLASTIC SURGERY | Facility: CLINIC | Age: 61
End: 2021-04-13
Payer: COMMERCIAL

## 2021-04-13 VITALS
BODY MASS INDEX: 44.16 KG/M2 | HEART RATE: 88 BPM | DIASTOLIC BLOOD PRESSURE: 84 MMHG | WEIGHT: 240 LBS | OXYGEN SATURATION: 94 % | HEIGHT: 62 IN | SYSTOLIC BLOOD PRESSURE: 135 MMHG | TEMPERATURE: 97.7 F

## 2021-04-13 PROCEDURE — 99072 ADDL SUPL MATRL&STAF TM PHE: CPT

## 2021-04-13 PROCEDURE — 99499Q: CUSTOM | Mod: NC

## 2021-04-22 ENCOUNTER — OUTPATIENT (OUTPATIENT)
Dept: OUTPATIENT SERVICES | Facility: HOSPITAL | Age: 61
LOS: 1 days | Discharge: HOME | End: 2021-04-22

## 2021-04-22 DIAGNOSIS — M12.9 ARTHROPATHY, UNSPECIFIED: Chronic | ICD-10-CM

## 2021-04-22 DIAGNOSIS — Z98.890 OTHER SPECIFIED POSTPROCEDURAL STATES: Chronic | ICD-10-CM

## 2021-04-22 DIAGNOSIS — Z90.49 ACQUIRED ABSENCE OF OTHER SPECIFIED PARTS OF DIGESTIVE TRACT: Chronic | ICD-10-CM

## 2021-04-22 DIAGNOSIS — C50.919 MALIGNANT NEOPLASM OF UNSPECIFIED SITE OF UNSPECIFIED FEMALE BREAST: ICD-10-CM

## 2021-04-22 DIAGNOSIS — Z90.13 ACQUIRED ABSENCE OF BILATERAL BREASTS AND NIPPLES: Chronic | ICD-10-CM

## 2021-04-23 ENCOUNTER — RX RENEWAL (OUTPATIENT)
Age: 61
End: 2021-04-23

## 2021-04-23 ENCOUNTER — APPOINTMENT (OUTPATIENT)
Dept: HEMATOLOGY ONCOLOGY | Facility: CLINIC | Age: 61
End: 2021-04-23
Payer: COMMERCIAL

## 2021-04-23 VITALS
SYSTOLIC BLOOD PRESSURE: 136 MMHG | BODY MASS INDEX: 44.46 KG/M2 | OXYGEN SATURATION: 97 % | DIASTOLIC BLOOD PRESSURE: 76 MMHG | HEART RATE: 86 BPM | RESPIRATION RATE: 16 BRPM | TEMPERATURE: 97.9 F | WEIGHT: 244.71 LBS | HEIGHT: 62.01 IN

## 2021-04-23 PROCEDURE — 99213 OFFICE O/P EST LOW 20 MIN: CPT

## 2021-04-23 PROCEDURE — 99072 ADDL SUPL MATRL&STAF TM PHE: CPT

## 2021-04-23 NOTE — REVIEW OF SYSTEMS
[Joint Pain] : joint pain [Joint Stiffness] : joint stiffness [Muscle Pain] : no muscle pain [Skin Rash] : no skin rash [Skin Wound] : no skin wound [Proptosis] : no proptosis [Hot Flashes] : hot flashes [Muscle Weakness] : no muscle weakness [Deepening Of The Voice] : no deepening of the voice [Negative] : Allergic/Immunologic [de-identified] : hair shedding on letrozole

## 2021-04-23 NOTE — ASSESSMENT
[FreeTextEntry1] : She is a 60 y/o  F with Stage IA left breast cancer s/p bilateral mastectomy with sentinel lymph node biopsy. She had been on anastrozole and exemestane with intolerable side effects. She has been on letrozole and continues to tolerate therapy with expected stiffness of the joints, hair thinning and hot flashes. She will continue with topical minoxidil for her hair. She will continue with exercise as tolerated. She will see hepatology for mildly elevated LFTs. Will obtain interval copy of her bone density from [facility] and see if she is due to repeat. Next follow up in 6 months but earlier if any new symptoms.\par

## 2021-04-23 NOTE — HISTORY OF PRESENT ILLNESS
[Disease: _____________________] : Disease: [unfilled] [T: ___] : T[unfilled] [N: ___] : N[unfilled] [AJCC Stage: ____] : AJCC Stage: [unfilled] [de-identified] : Age 59: left breast cancer \par Screening mammogram on 12/16/2018 showed focal asymmetry in the posterior central lateral left breast and additional imaging showing 7 mm spiculated mass at the left 5:00 area. She had left breast biopsy which showed invasive mammary carcinoma with lobular features ER >90%, AR 24.5%, Koq7bux negative and LCIS. MRI of the breast showed biopsy proven nonmass enhancement along with left breast 4:00 enhancement and 9:00 enhancement. She had MRI guided biopsy of the L 4:00 area that showed mild proliferative fibrocystic changes and 9:00 area that showed LCIS and fibrocystic changes. On 3/27/19, she underwent R prophylactic mastectomy with SLN biopsy and L mastectomy and sentinel lymph node biopsy with Dr Disla. The pathology showed R breast LCIS and L left mastectomy showed invasive lobular carcinoma and extensive LCIS classic type with extensive ductal extension and negative sentinel lymph nodes. OncotypeDx score was 10. She had MEEK flap reconstruction with Dr Mcclain. She had issues with diarrhea and hair loss with anastrozole and was switched to exemestane. She developed worsening arthralgias from the exemestane: unable to open and abduct her thumbs only improved by cortisone injection. She started trial of letrozole on 5/2020.   [de-identified] : invasive lobular carcinoma ER >90%, NH 24.5%, Sci2awn negative  [de-identified] : exemestane 7/26/19 to 1/2020 stopped due to arthralgias\par Inv[facility]ae 11/13/19 \par letrozole 5/2020 to present  [de-identified] : Saw Dr Elizabeth for protein in the blood: MGUS. She will be seeing Dr Gurwinder Altamirano for the liver. Had bone density done last 5/2020. She had EGD and found to have hernia near her gastric bypass and will be having repeat EGD after finishing course of antifungal. She is tolerating letrozole with stiffness/ crunchiness of the thumbs but able to use hands/ dexterity is unchanged. She has also creaking of the ankles but no new back or joint pain. She has improved hair loss on minoxidil but when she uses brush, she does notice more hair shedding. No new medications.

## 2021-04-23 NOTE — REASON FOR VISIT
[Follow-Up Visit] : a follow-up [FreeTextEntry2] : follow up for breast cancer on endocrine therapy

## 2021-04-23 NOTE — PHYSICAL EXAM
[Fully active, able to carry on all pre-disease performance without restriction] : Status 0 - Fully active, able to carry on all pre-disease performance without restriction [Normal] : affect appropriate [de-identified] : bilateral mastectomy with MEEK flap reconstruction; surgical tape with gauze over nipple tattoo  [de-identified] : able to make fist and abduct at DIP joint of the thumbs  [de-identified] : irritation over the reconstruction from surgical tape

## 2021-05-24 ENCOUNTER — APPOINTMENT (OUTPATIENT)
Dept: HEPATOLOGY | Facility: CLINIC | Age: 61
End: 2021-05-24
Payer: COMMERCIAL

## 2021-05-24 ENCOUNTER — APPOINTMENT (OUTPATIENT)
Dept: HEPATOLOGY | Facility: CLINIC | Age: 61
End: 2021-05-24

## 2021-05-24 VITALS — RESPIRATION RATE: 16 BRPM | HEIGHT: 62 IN | WEIGHT: 242 LBS | TEMPERATURE: 98.1 F | BODY MASS INDEX: 44.53 KG/M2

## 2021-05-24 PROCEDURE — 99204 OFFICE O/P NEW MOD 45 MIN: CPT

## 2021-05-24 PROCEDURE — 99072 ADDL SUPL MATRL&STAF TM PHE: CPT

## 2021-05-24 RX ORDER — OXYCODONE AND ACETAMINOPHEN 5; 325 MG/1; MG/1
5-325 TABLET ORAL
Qty: 20 | Refills: 0 | Status: DISCONTINUED | COMMUNITY
Start: 2020-11-20 | End: 2021-05-24

## 2021-05-24 RX ORDER — TRAZODONE HYDROCHLORIDE 300 MG/1
TABLET ORAL
Refills: 0 | Status: DISCONTINUED | COMMUNITY
End: 2021-05-24

## 2021-05-24 NOTE — PHYSICAL EXAM
[General Appearance - Alert] : alert [General Appearance - In No Acute Distress] : in no acute distress [Sclera] : the sclera and conjunctiva were normal [PERRL With Normal Accommodation] : pupils were equal in size, round, and reactive to light [Extraocular Movements] : extraocular movements were intact [Outer Ear] : the ears and nose were normal in appearance [Oropharynx] : the oropharynx was normal [Neck Appearance] : the appearance of the neck was normal [Neck Cervical Mass (___cm)] : no neck mass was observed [Jugular Venous Distention Increased] : there was no jugular-venous distention [Thyroid Diffuse Enlargement] : the thyroid was not enlarged [Thyroid Nodule] : there were no palpable thyroid nodules [Auscultation Breath Sounds / Voice Sounds] : lungs were clear to auscultation bilaterally [Heart Rate And Rhythm] : heart rate was normal and rhythm regular [Heart Sounds] : normal S1 and S2 [Heart Sounds Gallop] : no gallops [Murmurs] : no murmurs [Heart Sounds Pericardial Friction Rub] : no pericardial rub [Full Pulse] : the pedal pulses are present [Bowel Sounds] : normal bowel sounds [Abdomen Soft] : soft [Abdomen Tenderness] : non-tender [Abdomen Mass (___ Cm)] : no abdominal mass palpated [External Female Genitalia] : normal external genitalia [Urethral Meatus] : normal urethra [Urinary Bladder Findings] : the bladder was normal on palpation [Cervical Lymph Nodes Enlarged Posterior Bilaterally] : posterior cervical [Cervical Lymph Nodes Enlarged Anterior Bilaterally] : anterior cervical [Supraclavicular Lymph Nodes Enlarged Bilaterally] : supraclavicular [Axillary Lymph Nodes Enlarged Bilaterally] : axillary [Femoral Lymph Nodes Enlarged Bilaterally] : femoral [Inguinal Lymph Nodes Enlarged Bilaterally] : inguinal [No CVA Tenderness] : no ~M costovertebral angle tenderness [No Spinal Tenderness] : no spinal tenderness [Abnormal Walk] : normal gait [Nail Clubbing] : no clubbing  or cyanosis of the fingernails [Musculoskeletal - Swelling] : no joint swelling seen [Motor Tone] : muscle strength and tone were normal [Skin Color & Pigmentation] : normal skin color and pigmentation [Skin Turgor] : normal skin turgor [] : no rash [Deep Tendon Reflexes (DTR)] : deep tendon reflexes were 2+ and symmetric [Sensation] : the sensory exam was normal to light touch and pinprick [No Focal Deficits] : no focal deficits [Oriented To Time, Place, And Person] : oriented to person, place, and time [Impaired Insight] : insight and judgment were intact [Affect] : the affect was normal [Abdominal  Ascites] : no ascites [Splenomegaly] : no splenomegaly [Liver Palpable ___ Finger Breadths Below Costal Margin] : was not palpable below costal margin [Asterixis] : no asterixis observed [Jaundice] : No jaundice [Palmar Erythema] : no Palmar Erythema [Depression] : no depression [FreeTextEntry1] : very obse

## 2021-05-24 NOTE — ASSESSMENT
[FreeTextEntry1] : Ms. ARTHUR is a 61 year old woman with obesity s/p gastric bypass in 2004, ELI on CPAP, breast cancer s/p bilat. mastectomy 2019, asthma, referred because of elevated ALP ~200 since about 2015.\par She denies pruritus, jaundice. She has occas. joint pain in both thumbs and also trigger finger L ring finger.\par \par \par - elevated ALP ~ 200 U/L - DD includes liver disease, bone disease \par - severe obesity - at risk for HOWARD\par - US abdomen 2/16/21 ([facility]): fatty liver, no gallstones, limited exam b/o gas. No h/o signif. alcohol use.\par \par - leg edema: DD includes CHF, venous stasis due to obesity, other\par - colonoscopy: 2014, sees Dr. Camilo Humphries\par \par Plan:\par - labs as below\par - fibroscan \par - echocardiogram\par - return in 1 month

## 2021-05-24 NOTE — HISTORY OF PRESENT ILLNESS
[Fatigue] : denies fatigue [Malaise] : denies malaise [Fever] : denies fever [Diffuse Joint Pain (Arthralgias)] : arthralgias stable [Muscle Aches, Generalized (Myalgias)] : denies myalgias [Yellow Skin Or Eyes (Jaundice)] : denies jaundice [Abdominal Pain] : denies abdominal pain [Urine Tests Nonspecific Abnormal Findings Biliuria] : denies dark urine [Light Colored Bowel Movement (Acholic Stools)] : denies pale stools [Insomnia] : denies insomnia [Skin: Rash] : denies rash [Itching (Pruritus)] : denies pruritus [Shortness Of Breath] : denies shortness of breath [Needlestick Exposure] : no needlestick exposure [Infected Sexual Partner] : no infected sexual partner [IV Drug Use] : no IV drug use [Tattoo] : no tattoos [Body Piercing] : no body piercing [Hemodialysis] : no hemodialysis [Transfusion before 1992] : no transfusion before 1992 [Transplant before 1992] : no transplant before 1992 [Incarceration] : no incarceration [Alcohol Abuse] : no alcohol abuse [Autoimmune Disorder] : no autoimmune disorder [Household Contact to HBV] : no household contact to HBV [Travel to Endemic Area] : no travel to an endemic area [Occupational Exposure] : no occupational exposure [Cocaine Use] : no cocaine use [Wt Gain ___ Lbs] : no recent weight gain [Wt Loss ___ Lbs] : no recent weight loss [de-identified] : Ms. ARTHUR is a 61 year old woman with obesity s/p gastric bypass in 2004, ELI on CPAP, breast cancer s/p bilat. mastectomy 2019, asthma, referred because of elevated ALP ~200 since about 2015.\par She denies pruritus, jaundice. She has occas. joint pain in both thumbs and also trigger finger L ring finger.\par \par Weight hx: BMI 44, 242 lbs in 5/2021 - stable. Only lost 20 lbs after the bariatric surgery, from max. 268 lbs.\par Alcohol hx: occas. - 0-1 drinks per month. FHx: no liver disease

## 2021-05-24 NOTE — CONSULT LETTER
[Dear  ___] : Dear  [unfilled], [Consult Letter:] : I had the pleasure of evaluating your patient, [unfilled]. [Please see my note below.] : Please see my note below. [Consult Closing:] : Thank you very much for allowing me to participate in the care of this patient.  If you have any questions, please do not hesitate to contact me. [Sincerely,] : Sincerely, [FreeTextEntry2] : ERIC VICKERS (MINDY)\par  [FreeTextEntry3] : Gurwinder Altamirano MD\par , [facility]\par General Hepatology and Gastroenterology\par [facility]\par [facility]\par [address] [place] [address]\[place] [address]\par [phone]\par

## 2021-05-25 LAB
ALBUMIN SERPL ELPH-MCNC: 4.4 G/DL
ALP BLD-CCNC: 209 U/L
ALT SERPL-CCNC: 37 U/L
ANION GAP SERPL CALC-SCNC: 14 MMOL/L
AST SERPL-CCNC: 22 U/L
BASOPHILS # BLD AUTO: 0.06 K/UL
BASOPHILS NFR BLD AUTO: 0.7 %
BILIRUB SERPL-MCNC: 0.4 MG/DL
BUN SERPL-MCNC: 19 MG/DL
CALCIUM SERPL-MCNC: 9.5 MG/DL
CHLORIDE SERPL-SCNC: 109 MMOL/L
CHOLEST SERPL-MCNC: 197 MG/DL
CO2 SERPL-SCNC: 23 MMOL/L
CREAT SERPL-MCNC: 1.05 MG/DL
DEPRECATED KAPPA LC FREE/LAMBDA SER: 1.87 RATIO
EOSINOPHIL # BLD AUTO: 0.18 K/UL
EOSINOPHIL NFR BLD AUTO: 2.1 %
ESTIMATED AVERAGE GLUCOSE: 105 MG/DL
FERRITIN SERPL-MCNC: 91 NG/ML
GGT SERPL-CCNC: 28 U/L
GLUCOSE SERPL-MCNC: 78 MG/DL
HBA1C MFR BLD HPLC: 5.3 %
HBV CORE IGG+IGM SER QL: NONREACTIVE
HBV SURFACE AB SER QL: NONREACTIVE
HBV SURFACE AG SER QL: NONREACTIVE
HCT VFR BLD CALC: 46.3 %
HCV AB SER QL: NONREACTIVE
HCV S/CO RATIO: 0.08 S/CO
HDLC SERPL-MCNC: 62 MG/DL
HEPATITIS A IGG ANTIBODY: NONREACTIVE
HGB BLD-MCNC: 14.3 G/DL
IGA SER QL IEP: 141 MG/DL
IGG SER QL IEP: 1238 MG/DL
IGM SER QL IEP: 59 MG/DL
IMM GRANULOCYTES NFR BLD AUTO: 0.4 %
INR PPP: 1.03 RATIO
IRON SATN MFR SERPL: 18 %
IRON SERPL-MCNC: 75 UG/DL
KAPPA LC CSF-MCNC: 0.76 MG/DL
KAPPA LC SERPL-MCNC: 1.42 MG/DL
LDLC SERPL CALC-MCNC: 109 MG/DL
LYMPHOCYTES # BLD AUTO: 2.21 K/UL
LYMPHOCYTES NFR BLD AUTO: 26.3 %
MAN DIFF?: NORMAL
MCHC RBC-ENTMCNC: 29.2 PG
MCHC RBC-ENTMCNC: 30.9 GM/DL
MCV RBC AUTO: 94.7 FL
MITOCHONDRIA AB SER IF-ACNC: NORMAL
MONOCYTES # BLD AUTO: 0.68 K/UL
MONOCYTES NFR BLD AUTO: 8.1 %
NEUTROPHILS # BLD AUTO: 5.25 K/UL
NEUTROPHILS NFR BLD AUTO: 62.4 %
NONHDLC SERPL-MCNC: 135 MG/DL
PLATELET # BLD AUTO: 289 K/UL
POTASSIUM SERPL-SCNC: 4.5 MMOL/L
PROT SERPL-MCNC: 7.1 G/DL
PT BLD: 12.2 SEC
RBC # BLD: 4.89 M/UL
RBC # FLD: 14.4 %
SMOOTH MUSCLE AB SER QL IF: NORMAL
SODIUM SERPL-SCNC: 145 MMOL/L
TIBC SERPL-MCNC: 417 UG/DL
TRIGL SERPL-MCNC: 127 MG/DL
UIBC SERPL-MCNC: 341 UG/DL
WBC # FLD AUTO: 8.41 K/UL

## 2021-05-27 ENCOUNTER — NON-APPOINTMENT (OUTPATIENT)
Age: 61
End: 2021-05-27

## 2021-05-27 ENCOUNTER — APPOINTMENT (OUTPATIENT)
Dept: HEPATOLOGY | Facility: CLINIC | Age: 61
End: 2021-05-27
Payer: COMMERCIAL

## 2021-05-27 LAB
ALP BLD-CCNC: 209 IU/L
ALP BONE CFR SERPL: 49 %
ALP INTEST CFR SERPL: 16 %
ALP LIVER CFR SERPL: 35 %
ANA SER IF-ACNC: NEGATIVE

## 2021-05-27 PROCEDURE — 91200 LIVER ELASTOGRAPHY: CPT

## 2021-05-27 PROCEDURE — 99072 ADDL SUPL MATRL&STAF TM PHE: CPT

## 2021-06-03 LAB
A1AT PHENOTYP SERPL-IMP: NORMAL
A1AT SERPL-MCNC: 118 MG/DL

## 2021-06-24 ENCOUNTER — NON-APPOINTMENT (OUTPATIENT)
Age: 61
End: 2021-06-24
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
## 2021-06-28 ENCOUNTER — NON-APPOINTMENT (OUTPATIENT)
Age: 61
End: 2021-06-28

## 2021-06-29 ENCOUNTER — NON-APPOINTMENT (OUTPATIENT)
Age: 61
End: 2021-06-29

## 2021-07-01 ENCOUNTER — APPOINTMENT (OUTPATIENT)
Dept: CARDIOLOGY | Facility: CLINIC | Age: 61
End: 2021-07-01
Payer: COMMERCIAL

## 2021-07-01 ENCOUNTER — NON-APPOINTMENT (OUTPATIENT)
Age: 61
End: 2021-07-01

## 2021-07-01 PROCEDURE — 99072 ADDL SUPL MATRL&STAF TM PHE: CPT

## 2021-07-01 PROCEDURE — 93306 TTE W/DOPPLER COMPLETE: CPT

## 2021-07-08 ENCOUNTER — APPOINTMENT (OUTPATIENT)
Dept: HEPATOLOGY | Facility: CLINIC | Age: 61
End: 2021-07-08
Payer: COMMERCIAL

## 2021-07-08 VITALS
OXYGEN SATURATION: 98 % | WEIGHT: 247.7 LBS | HEIGHT: 62 IN | BODY MASS INDEX: 45.58 KG/M2 | TEMPERATURE: 98 F | DIASTOLIC BLOOD PRESSURE: 86 MMHG | HEART RATE: 82 BPM | SYSTOLIC BLOOD PRESSURE: 143 MMHG | RESPIRATION RATE: 16 BRPM

## 2021-07-08 PROCEDURE — 99214 OFFICE O/P EST MOD 30 MIN: CPT

## 2021-07-08 PROCEDURE — 99072 ADDL SUPL MATRL&STAF TM PHE: CPT

## 2021-07-09 NOTE — ASSESSMENT
[FreeTextEntry1] : Ms. ARTHUR is a 61 year old woman with obesity s/p gastric bypass in 2004, ELI on CPAP, breast cancer s/p bilat. mastectomy 2019, asthma, referred because of elevated ALP ~200 since about 2015.\par She denies pruritus, jaundice. She has occas. joint pain in both thumbs and also trigger finger L ring finger.\par \par \par - mildly elevated ALP ~ 200 U/L - not due to liver disease, possibly due to bone disease. GGT was normal and liver isoenzyme was only 35%. Bone isoenzyme was 49% - normal proportion, but the largest proportion of the enzyme. Intestinal isoenzyme was normal, 16%.\par - severe obesity, BMI still 45\par - NAFLD without fibrosis: 5/27/21 fibroscan: 5.6 kPa, 400 dB/m - no fibrosis, severe steatosis\par - [facility] abdomen 2/16/21 ([facility]): fatty liver, no gallstones, limited exam b/o gas. No h/o signif. alcohol use.\par \par - naive to hepatitis A, B, and C.\par \par - leg edema: echo 7/01/21 essentially normal, no diastolic dysfunction or R heart disease. Cause may be venous insufficiency.\par - colonoscopy: 2014, sees Dr. Camilo Humphries\par \par Pt. is interested in weight loss medication. I discussed that semaglutide has been shown to lead to weight loss, improve HOWARD and to lead to greater weight loss than liraglutide ([place] et al., N Engl J Med 2020; [place] et al., Lancet 2018). Weight loss in patients with obesity was 15% and 10% after 68 weeks without and with diabetes, respectively (Mateus et al., NEJM 2021; [place] et al., Lancet 2021). Main possible side effects are nausea and other gastrointestinal symptoms.\par \par \par Plan:\par - elevated ALP: f/u with PCP for further evaluation of possible bone disease. \par - start semaglutide s.c. 0.25 mg/week x 2, then double to 0.5 mg/week if tolerated\par - return in 1 month\par - NAFLD: weight loss, diet, exercise; will plan to repeat evaluation for liver fibrosis after 2-3 years

## 2021-07-09 NOTE — HISTORY OF PRESENT ILLNESS
[Fatigue] : denies fatigue [Malaise] : denies malaise [Fever] : denies fever [Diffuse Joint Pain (Arthralgias)] : arthralgias stable [Muscle Aches, Generalized (Myalgias)] : denies myalgias [Yellow Skin Or Eyes (Jaundice)] : denies jaundice [Abdominal Pain] : denies abdominal pain [Urine Tests Nonspecific Abnormal Findings Biliuria] : denies dark urine [Light Colored Bowel Movement (Acholic Stools)] : denies pale stools [Insomnia] : denies insomnia [Skin: Rash] : denies rash [Itching (Pruritus)] : denies pruritus [Shortness Of Breath] : denies shortness of breath [Needlestick Exposure] : no needlestick exposure [Infected Sexual Partner] : no infected sexual partner [IV Drug Use] : no IV drug use [Tattoo] : no tattoos [Body Piercing] : no body piercing [Hemodialysis] : no hemodialysis [Transfusion before 1992] : no transfusion before 1992 [Transplant before 1992] : no transplant before 1992 [Incarceration] : no incarceration [Alcohol Abuse] : no alcohol abuse [Autoimmune Disorder] : no autoimmune disorder [Household Contact to HBV] : no household contact to HBV [Travel to Endemic Area] : no travel to an endemic area [Occupational Exposure] : no occupational exposure [Cocaine Use] : no cocaine use [Wt Gain ___ Lbs] : no recent weight gain [Wt Loss ___ Lbs] : no recent weight loss [de-identified] : - 7/8/21: returns after bloodwork and fibroscan.\par \par - Ms. ARTHUR is a 61 year old woman with obesity s/p gastric bypass in 2004, ELI on CPAP, breast cancer s/p bilat. mastectomy 2019, asthma, referred because of elevated ALP ~200 since about 2015.\par She denies pruritus, jaundice. She has occas. joint pain in both thumbs and also trigger finger L ring finger.\par \par Weight hx: BMI 44, 242 lbs in 5/2021 - stable. Only lost 20 lbs after the bariatric surgery, from max. 268 lbs.\par Alcohol hx: occas. - 0-1 drinks per month. FHx: no liver disease \par \par Workup:\par - 7/01/21 echo: EF 68%, mild AR, endocardium not well visualized, grossly normal LV function, minimal TR. Normal pulm pressure.\par - 5/27/21 fibroscan: 5.6 kPa, 400 dB/m - no fibrosis, severe steatosis\par - US abdomen 2/16/21 ([facility]): fatty liver, no gallstones, limited exam b/o gas.

## 2021-07-09 NOTE — CONSULT LETTER
[Dear  ___] : Dear  [unfilled], [Consult Letter:] : I had the pleasure of evaluating your patient, [unfilled]. [Please see my note below.] : Please see my note below. [Consult Closing:] : Thank you very much for allowing me to participate in the care of this patient.  If you have any questions, please do not hesitate to contact me. [Sincerely,] : Sincerely, [FreeTextEntry2] : ERIC VICKERS (MINDY)\par  [FreeTextEntry3] : Gurwinder Altamirano MD\par , [facility]\par General Hepatology and Gastroenterology\par [facility]\par [facility]\par [address] [place] [address]\[place] [address]\par [phone]\par

## 2021-07-14 ENCOUNTER — OUTPATIENT (OUTPATIENT)
Dept: OUTPATIENT SERVICES | Facility: HOSPITAL | Age: 61
LOS: 1 days | End: 2021-07-14
Payer: COMMERCIAL

## 2021-07-14 VITALS
OXYGEN SATURATION: 97 % | HEART RATE: 72 BPM | RESPIRATION RATE: 15 BRPM | DIASTOLIC BLOOD PRESSURE: 80 MMHG | TEMPERATURE: 98 F | WEIGHT: 242.51 LBS | HEIGHT: 62 IN | SYSTOLIC BLOOD PRESSURE: 136 MMHG

## 2021-07-14 DIAGNOSIS — Z85.3 PERSONAL HISTORY OF MALIGNANT NEOPLASM OF BREAST: ICD-10-CM

## 2021-07-14 DIAGNOSIS — Z90.13 ACQUIRED ABSENCE OF BILATERAL BREASTS AND NIPPLES: ICD-10-CM

## 2021-07-14 DIAGNOSIS — N65.0 DEFORMITY OF RECONSTRUCTED BREAST: ICD-10-CM

## 2021-07-14 DIAGNOSIS — M12.9 ARTHROPATHY, UNSPECIFIED: Chronic | ICD-10-CM

## 2021-07-14 DIAGNOSIS — Z98.890 OTHER SPECIFIED POSTPROCEDURAL STATES: Chronic | ICD-10-CM

## 2021-07-14 DIAGNOSIS — N65.1 DISPROPORTION OF RECONSTRUCTED BREAST: ICD-10-CM

## 2021-07-14 DIAGNOSIS — Z90.49 ACQUIRED ABSENCE OF OTHER SPECIFIED PARTS OF DIGESTIVE TRACT: Chronic | ICD-10-CM

## 2021-07-14 DIAGNOSIS — Z41.9 ENCOUNTER FOR PROCEDURE FOR PURPOSES OTHER THAN REMEDYING HEALTH STATE, UNSPECIFIED: Chronic | ICD-10-CM

## 2021-07-14 DIAGNOSIS — Z01.818 ENCOUNTER FOR OTHER PREPROCEDURAL EXAMINATION: ICD-10-CM

## 2021-07-14 DIAGNOSIS — Z42.1 ENCOUNTER FOR BREAST RECONSTRUCTION FOLLOWING MASTECTOMY: ICD-10-CM

## 2021-07-14 DIAGNOSIS — Z90.13 ACQUIRED ABSENCE OF BILATERAL BREASTS AND NIPPLES: Chronic | ICD-10-CM

## 2021-07-14 LAB
ALBUMIN SERPL ELPH-MCNC: 3.7 G/DL — SIGNIFICANT CHANGE UP (ref 3.3–5)
ALP SERPL-CCNC: 222 U/L — HIGH (ref 40–120)
ALT FLD-CCNC: 51 U/L — HIGH (ref 10–45)
ANION GAP SERPL CALC-SCNC: 9 MMOL/L — SIGNIFICANT CHANGE UP (ref 5–17)
AST SERPL-CCNC: 29 U/L — SIGNIFICANT CHANGE UP (ref 10–40)
BILIRUB SERPL-MCNC: 0.4 MG/DL — SIGNIFICANT CHANGE UP (ref 0.2–1.2)
BUN SERPL-MCNC: 21 MG/DL — SIGNIFICANT CHANGE UP (ref 7–23)
CALCIUM SERPL-MCNC: 9.2 MG/DL — SIGNIFICANT CHANGE UP (ref 8.4–10.5)
CHLORIDE SERPL-SCNC: 104 MMOL/L — SIGNIFICANT CHANGE UP (ref 96–108)
CO2 SERPL-SCNC: 29 MMOL/L — SIGNIFICANT CHANGE UP (ref 22–31)
CREAT SERPL-MCNC: 1.12 MG/DL — SIGNIFICANT CHANGE UP (ref 0.5–1.3)
GLUCOSE SERPL-MCNC: 91 MG/DL — SIGNIFICANT CHANGE UP (ref 70–99)
HCT VFR BLD CALC: 44.7 % — SIGNIFICANT CHANGE UP (ref 34.5–45)
HGB BLD-MCNC: 14.7 G/DL — SIGNIFICANT CHANGE UP (ref 11.5–15.5)
MCHC RBC-ENTMCNC: 29.2 PG — SIGNIFICANT CHANGE UP (ref 27–34)
MCHC RBC-ENTMCNC: 32.9 GM/DL — SIGNIFICANT CHANGE UP (ref 32–36)
MCV RBC AUTO: 88.9 FL — SIGNIFICANT CHANGE UP (ref 80–100)
NRBC # BLD: 0 /100 WBCS — SIGNIFICANT CHANGE UP (ref 0–0)
PLATELET # BLD AUTO: 273 K/UL — SIGNIFICANT CHANGE UP (ref 150–400)
POTASSIUM SERPL-MCNC: 4.5 MMOL/L — SIGNIFICANT CHANGE UP (ref 3.5–5.3)
POTASSIUM SERPL-SCNC: 4.5 MMOL/L — SIGNIFICANT CHANGE UP (ref 3.5–5.3)
PROT SERPL-MCNC: 7.9 G/DL — SIGNIFICANT CHANGE UP (ref 6–8.3)
RBC # BLD: 5.03 M/UL — SIGNIFICANT CHANGE UP (ref 3.8–5.2)
RBC # FLD: 13.5 % — SIGNIFICANT CHANGE UP (ref 10.3–14.5)
SODIUM SERPL-SCNC: 142 MMOL/L — SIGNIFICANT CHANGE UP (ref 135–145)
WBC # BLD: 8.18 K/UL — SIGNIFICANT CHANGE UP (ref 3.8–10.5)
WBC # FLD AUTO: 8.18 K/UL — SIGNIFICANT CHANGE UP (ref 3.8–10.5)

## 2021-07-14 PROCEDURE — 93005 ELECTROCARDIOGRAM TRACING: CPT

## 2021-07-14 PROCEDURE — 36415 COLL VENOUS BLD VENIPUNCTURE: CPT

## 2021-07-14 PROCEDURE — 85027 COMPLETE CBC AUTOMATED: CPT

## 2021-07-14 PROCEDURE — 93010 ELECTROCARDIOGRAM REPORT: CPT | Mod: NC

## 2021-07-14 PROCEDURE — G0463: CPT

## 2021-07-14 PROCEDURE — 80053 COMPREHEN METABOLIC PANEL: CPT

## 2021-07-14 NOTE — H&P PST ADULT - NSICDXPASTSURGICALHX_GEN_ALL_CORE_FT
PAST SURGICAL HISTORY:  Arthropathy right knee meniscus surgery    Elective surgery breast revision bilat   Nov 2020    H/O bilateral mastectomy with MEEK flap 3/2019    H/O gastric bypass 2004    S/P appendectomy 1971    S/P bunionectomy Right x 1 - 1999. left x 2 with hardware- 2001, 2014

## 2021-07-14 NOTE — H&P PST ADULT - ASSESSMENT
62 yo obese F for  revision left breast reconstruction    medical clearance recommended      Labs pending

## 2021-07-20 ENCOUNTER — APPOINTMENT (OUTPATIENT)
Dept: INTERNAL MEDICINE | Facility: CLINIC | Age: 61
End: 2021-07-20
Payer: COMMERCIAL

## 2021-07-20 VITALS
OXYGEN SATURATION: 95 % | HEIGHT: 62 IN | SYSTOLIC BLOOD PRESSURE: 159 MMHG | HEART RATE: 79 BPM | TEMPERATURE: 98.2 F | BODY MASS INDEX: 44.35 KG/M2 | WEIGHT: 241 LBS | DIASTOLIC BLOOD PRESSURE: 86 MMHG

## 2021-07-20 DIAGNOSIS — Z78.9 OTHER SPECIFIED HEALTH STATUS: ICD-10-CM

## 2021-07-20 DIAGNOSIS — R60.0 LOCALIZED EDEMA: ICD-10-CM

## 2021-07-20 PROCEDURE — 99204 OFFICE O/P NEW MOD 45 MIN: CPT

## 2021-07-20 PROCEDURE — 99072 ADDL SUPL MATRL&STAF TM PHE: CPT

## 2021-07-21 NOTE — RESULTS/DATA
[] : results reviewed [ECG Reviewed] : reviewed [Normal] : The 12 - lead ECG is normal [No Acute Ischemia] : No acute ischemia [de-identified] : stable [de-identified] : elevated LFT stable

## 2021-07-21 NOTE — PHYSICAL EXAM
[No Carotid Bruits] : no carotid bruits [No Abdominal Bruit] : a ~M bruit was not heard ~T in the abdomen [Normal] : affect was normal and insight and judgment were intact [de-identified] : +1 pitting edema B/L Left > right

## 2021-07-21 NOTE — HISTORY OF PRESENT ILLNESS
[No Pertinent Cardiac History] : no history of aortic stenosis, atrial fibrillation, coronary artery disease, recent myocardial infarction, or implantable device/pacemaker [Asthma] : asthma [COPD] : no COPD [Sleep Apnea] : sleep apnea [Smoker] : not a smoker [No Adverse Anesthesia Reaction] : no adverse anesthesia reaction in self or family member [Chronic Anticoagulation] : no chronic anticoagulation [Chronic Kidney Disease] : no chronic kidney disease [Diabetes] : no diabetes [(Patient denies any chest pain, claudication, dyspnea on exertion, orthopnea, palpitations or syncope)] : Patient denies any chest pain, claudication, dyspnea on exertion, orthopnea, palpitations or syncope [Good (7-10 METs)] : Good (7-10 METs) [FreeTextEntry1] : left Breast revision  [FreeTextEntry2] : 7/23/21 [FreeTextEntry3] : Dr. Anmol Mcclain  [FreeTextEntry4] : 60 y/o female patient is new to the office presents today:\par - Pre-operative clearance - patient with hx of  lobular carcinoma of left breast s/p mastectomy, s/p reconstruction, will undergo revision of left breast reconstruction with debulking and excision of excess lateral skin and tissue on 7/23/21 at [facility] to be performed by Dr. Mcclain. \par - reviewed pre- surgical labs and EKG with patient today

## 2021-07-22 ENCOUNTER — TRANSCRIPTION ENCOUNTER (OUTPATIENT)
Age: 61
End: 2021-07-22

## 2021-07-23 ENCOUNTER — RESULT REVIEW (OUTPATIENT)
Age: 61
End: 2021-07-23

## 2021-07-23 ENCOUNTER — OUTPATIENT (OUTPATIENT)
Dept: OUTPATIENT SERVICES | Facility: HOSPITAL | Age: 61
LOS: 1 days | End: 2021-07-23
Payer: COMMERCIAL

## 2021-07-23 ENCOUNTER — APPOINTMENT (OUTPATIENT)
Dept: PLASTIC SURGERY | Facility: HOSPITAL | Age: 61
End: 2021-07-23
Payer: COMMERCIAL

## 2021-07-23 VITALS
HEIGHT: 62 IN | OXYGEN SATURATION: 96 % | RESPIRATION RATE: 14 BRPM | WEIGHT: 242.51 LBS | DIASTOLIC BLOOD PRESSURE: 82 MMHG | SYSTOLIC BLOOD PRESSURE: 146 MMHG | HEART RATE: 66 BPM | TEMPERATURE: 98 F

## 2021-07-23 VITALS
TEMPERATURE: 98 F | OXYGEN SATURATION: 96 % | RESPIRATION RATE: 16 BRPM | DIASTOLIC BLOOD PRESSURE: 72 MMHG | HEART RATE: 75 BPM | SYSTOLIC BLOOD PRESSURE: 140 MMHG

## 2021-07-23 DIAGNOSIS — Z90.49 ACQUIRED ABSENCE OF OTHER SPECIFIED PARTS OF DIGESTIVE TRACT: Chronic | ICD-10-CM

## 2021-07-23 DIAGNOSIS — Z98.890 OTHER SPECIFIED POSTPROCEDURAL STATES: Chronic | ICD-10-CM

## 2021-07-23 DIAGNOSIS — N65.1 DISPROPORTION OF RECONSTRUCTED BREAST: ICD-10-CM

## 2021-07-23 DIAGNOSIS — Z90.13 ACQUIRED ABSENCE OF BILATERAL BREASTS AND NIPPLES: Chronic | ICD-10-CM

## 2021-07-23 DIAGNOSIS — Z41.9 ENCOUNTER FOR PROCEDURE FOR PURPOSES OTHER THAN REMEDYING HEALTH STATE, UNSPECIFIED: Chronic | ICD-10-CM

## 2021-07-23 DIAGNOSIS — N65.0 DEFORMITY OF RECONSTRUCTED BREAST: ICD-10-CM

## 2021-07-23 DIAGNOSIS — M12.9 ARTHROPATHY, UNSPECIFIED: Chronic | ICD-10-CM

## 2021-07-23 DIAGNOSIS — Z85.3 PERSONAL HISTORY OF MALIGNANT NEOPLASM OF BREAST: ICD-10-CM

## 2021-07-23 DIAGNOSIS — Z42.1 ENCOUNTER FOR BREAST RECONSTRUCTION FOLLOWING MASTECTOMY: ICD-10-CM

## 2021-07-23 DIAGNOSIS — Z90.13 ACQUIRED ABSENCE OF BILATERAL BREASTS AND NIPPLES: ICD-10-CM

## 2021-07-23 PROCEDURE — 88304 TISSUE EXAM BY PATHOLOGIST: CPT

## 2021-07-23 PROCEDURE — 19380 REVJ RECONSTRUCTED BREAST: CPT | Mod: LT

## 2021-07-23 PROCEDURE — 88304 TISSUE EXAM BY PATHOLOGIST: CPT | Mod: 26

## 2021-07-23 RX ORDER — HYDROMORPHONE HYDROCHLORIDE 2 MG/ML
0.5 INJECTION INTRAMUSCULAR; INTRAVENOUS; SUBCUTANEOUS
Refills: 0 | Status: DISCONTINUED | OUTPATIENT
Start: 2021-07-23 | End: 2021-07-23

## 2021-07-23 RX ORDER — ONDANSETRON 8 MG/1
4 TABLET, FILM COATED ORAL ONCE
Refills: 0 | Status: DISCONTINUED | OUTPATIENT
Start: 2021-07-23 | End: 2021-07-23

## 2021-07-23 RX ORDER — DESVENLAFAXINE 50 MG/1
1 TABLET, EXTENDED RELEASE ORAL
Qty: 0 | Refills: 0 | DISCHARGE

## 2021-07-23 RX ORDER — FLUTICASONE FUROATE AND VILANTEROL TRIFENATATE 100; 25 UG/1; UG/1
1 POWDER RESPIRATORY (INHALATION)
Qty: 0 | Refills: 0 | DISCHARGE

## 2021-07-23 RX ORDER — BUPROPION HYDROCHLORIDE 150 MG/1
150 TABLET, EXTENDED RELEASE ORAL
Qty: 0 | Refills: 0 | DISCHARGE

## 2021-07-23 RX ORDER — ALBUTEROL 90 UG/1
0 AEROSOL, METERED ORAL
Qty: 0 | Refills: 0 | DISCHARGE

## 2021-07-23 RX ORDER — OMEGA-3 ACID ETHYL ESTERS 1 G
0 CAPSULE ORAL
Qty: 0 | Refills: 0 | DISCHARGE

## 2021-07-23 RX ORDER — LETROZOLE 2.5 MG/1
1 TABLET, FILM COATED ORAL
Qty: 0 | Refills: 0 | DISCHARGE

## 2021-07-23 RX ORDER — SODIUM CHLORIDE 9 MG/ML
1000 INJECTION, SOLUTION INTRAVENOUS
Refills: 0 | Status: DISCONTINUED | OUTPATIENT
Start: 2021-07-23 | End: 2021-07-23

## 2021-07-23 RX ORDER — MONTELUKAST 4 MG/1
1 TABLET, CHEWABLE ORAL
Qty: 0 | Refills: 0 | DISCHARGE

## 2021-07-23 NOTE — ASU PATIENT PROFILE, ADULT - PSH
Arthropathy  right knee meniscus surgery  Elective surgery  breast revision bilat   Nov 2020  H/O bilateral mastectomy  with MEEK flap 3/2019  H/O gastric bypass  2004  S/P appendectomy  1971  S/P bunionectomy  Right x 1 - 1999. left x 2 with hardware- 2001, 2014

## 2021-07-23 NOTE — ASU PATIENT PROFILE, ADULT - PMH
Anemia, unspecified type    Anxiety and depression    Asthma    Breast cancer  left side in 2019  Encounter for breast reconstruction following mastectomy    History of gastric ulcer    History of melanoma  removed from back.  History of shingles  October 2018  Incisional hernia, without obstruction or gangrene    Morbid obesity    Obesity, unspecified obesity severity, unspecified obesity type    Sleep apnea, obstructive  uses device at night  Uncomplicated asthma, unspecified asthma severity

## 2021-07-23 NOTE — ASU DISCHARGE PLAN (ADULT/PEDIATRIC) - CARE PROVIDER_API CALL
Anmol Mcclain; JOSE RAMON)  Otolaryngology; Plastic Surgery  [address] [place] [address] [address]  [place] [address]  Phone: [phone]  Fax: [phone]  Follow Up Time:

## 2021-07-23 NOTE — BRIEF OPERATIVE NOTE - OPERATION/FINDINGS
Revision of left breast reconstruction with excision of lateral chest wall excess skin and adiposity.

## 2021-07-29 ENCOUNTER — NON-APPOINTMENT (OUTPATIENT)
Age: 61
End: 2021-07-29

## 2021-07-29 LAB — SURGICAL PATHOLOGY STUDY: SIGNIFICANT CHANGE UP

## 2021-08-02 ENCOUNTER — APPOINTMENT (OUTPATIENT)
Dept: PLASTIC SURGERY | Facility: CLINIC | Age: 61
End: 2021-08-02
Payer: COMMERCIAL

## 2021-08-02 PROCEDURE — 99024 POSTOP FOLLOW-UP VISIT: CPT

## 2021-08-18 ENCOUNTER — APPOINTMENT (OUTPATIENT)
Dept: PLASTIC SURGERY | Facility: CLINIC | Age: 61
End: 2021-08-18
Payer: COMMERCIAL

## 2021-08-18 PROCEDURE — 99024 POSTOP FOLLOW-UP VISIT: CPT

## 2021-08-31 ENCOUNTER — APPOINTMENT (OUTPATIENT)
Dept: HEPATOLOGY | Facility: CLINIC | Age: 61
End: 2021-08-31
Payer: COMMERCIAL

## 2021-08-31 ENCOUNTER — APPOINTMENT (OUTPATIENT)
Dept: INTERNAL MEDICINE | Facility: CLINIC | Age: 61
End: 2021-08-31
Payer: COMMERCIAL

## 2021-08-31 VITALS
SYSTOLIC BLOOD PRESSURE: 148 MMHG | OXYGEN SATURATION: 97 % | WEIGHT: 232 LBS | DIASTOLIC BLOOD PRESSURE: 81 MMHG | RESPIRATION RATE: 16 BRPM | HEART RATE: 75 BPM | HEIGHT: 62 IN | BODY MASS INDEX: 42.69 KG/M2 | TEMPERATURE: 97.9 F

## 2021-08-31 PROCEDURE — 99213 OFFICE O/P EST LOW 20 MIN: CPT

## 2021-08-31 PROCEDURE — 90750 HZV VACC RECOMBINANT IM: CPT

## 2021-08-31 PROCEDURE — 90471 IMMUNIZATION ADMIN: CPT

## 2021-08-31 NOTE — CONSULT LETTER
[Dear  ___] : Dear  [unfilled], [Consult Letter:] : I had the pleasure of evaluating your patient, [unfilled]. [Please see my note below.] : Please see my note below. [Consult Closing:] : Thank you very much for allowing me to participate in the care of this patient.  If you have any questions, please do not hesitate to contact me. [Sincerely,] : Sincerely, [FreeTextEntry2] : ERIC VICKERS (MINDY)\par  [FreeTextEntry3] : Gurwinder Altamirano MD\par , [facility]\par General Hepatology and Gastroenterology\par [facility]\par [facility]\par [address] [place] [address]\[place] [address]\par [phone]\par

## 2021-08-31 NOTE — HISTORY OF PRESENT ILLNESS
[Fatigue] : denies fatigue [Malaise] : denies malaise [Fever] : denies fever [Diffuse Joint Pain (Arthralgias)] : arthralgias stable [Muscle Aches, Generalized (Myalgias)] : denies myalgias [Yellow Skin Or Eyes (Jaundice)] : denies jaundice [Abdominal Pain] : denies abdominal pain [Urine Tests Nonspecific Abnormal Findings Biliuria] : denies dark urine [Light Colored Bowel Movement (Acholic Stools)] : denies pale stools [Insomnia] : denies insomnia [Skin: Rash] : denies rash [Itching (Pruritus)] : denies pruritus [Shortness Of Breath] : denies shortness of breath [Needlestick Exposure] : no needlestick exposure [Infected Sexual Partner] : no infected sexual partner [IV Drug Use] : no IV drug use [Tattoo] : no tattoos [Body Piercing] : no body piercing [Hemodialysis] : no hemodialysis [Transfusion before 1992] : no transfusion before 1992 [Transplant before 1992] : no transplant before 1992 [Incarceration] : no incarceration [Alcohol Abuse] : no alcohol abuse [Autoimmune Disorder] : no autoimmune disorder [Household Contact to HBV] : no household contact to HBV [Travel to Endemic Area] : no travel to an endemic area [Occupational Exposure] : no occupational exposure [Cocaine Use] : no cocaine use [Wt Gain ___ Lbs] : no recent weight gain [Wt Loss ___ Lbs] : no recent weight loss [de-identified] : - 8/31/21: returns after starting semaglutide 0.25 mg/week x 2 weeks, and 0.5 mg/week since s.c. Lost 15 lbs since last visit. Has less appetite, rarely snacks now, craving resolved. Still makes herself eat two or three meals per day out of concern she might get craving at night. Walks or bikes 3 times per week. Denies side effects.\par \par - 7/8/21: returns after bloodwork and fibroscan.\par \par - Ms. ARTHUR is a 61 year old woman with obesity s/p gastric bypass in 2004, LEI on CPAP, breast cancer s/p bilat. mastectomy 2019, asthma, referred because of elevated ALP ~200 since about 2015.\par She denies pruritus, jaundice. She has occas. joint pain in both thumbs and also trigger finger L ring finger.\par \par Weight hx: BMI 44, 242 lbs in 5/2021 - stable. Only lost 20 lbs after the bariatric surgery, from max. 268 lbs.\par Alcohol hx: occas. - 0-1 drinks per month. FHx: no liver disease \par \par Workup:\par - 7/01/21 echo: EF 68%, mild AR, endocardium not well visualized, grossly normal LV function, minimal TR. Normal pulm pressure.\par - 5/27/21 fibroscan: 5.6 kPa, 400 dB/m - no fibrosis, severe steatosis\par - US abdomen 2/16/21 ([facility]): fatty liver, no gallstones, limited exam b/o gas.

## 2021-08-31 NOTE — ASSESSMENT
[FreeTextEntry1] : Ms. ARTHUR is a 61 year old woman with obesity s/p gastric bypass in 2004, ELI on CPAP, breast cancer s/p bilat. mastectomy 2019, asthma, referred in 5/2021 because of elevated ALP ~200 since about 2015.\par She denies pruritus, jaundice. She has occas. joint pain in both thumbs and also trigger finger L ring finger.\par \par \par - mildly elevated ALP ~ 200 U/L - not due to liver disease, possibly due to bone disease. GGT 28 U/L, i.e. normal, liver isoenzyme was only 35%. Bone isoenzyme was 49% - normal proportion, but the largest proportion of the enzyme. Intestinal isoenzyme was normal, 16%.\par - severe obesity, on Ozempic since 7/2021, 0.5 mg/week - lost 15 lbs since, BMI 42, no side effects\par - NAFLD without fibrosis: 5/27/21 fibroscan: 5.6 kPa, 400 dB/m - no fibrosis, severe steatosis\par - US abdomen 2/16/21 ([facility]): fatty liver, no gallstones, limited exam b/o gas. No h/o signif. alcohol use.\par \par - naive to hepatitis A, B, and C.\par \par - leg edema: echo 7/01/21 essentially normal, no diastolic dysfunction or R heart disease. Cause may be venous insufficiency.\par - colonoscopy: 2014, sees Dr. Camilo Humphries\par \par I discussed that she does not need to eat additional meals to reach 2 or 3 meals per day if she does not feel hungry, and that intermittent fasting, e.g. with one meal per day, appears to have health benefits rather than being unhealthy.\par \par Plan:\par - continue semaglutide s.c. 0.5 mg/week\par - return in 3 months. She will call if she stops losing weight before that.\par - NAFLD: weight loss, diet, exercise; will plan to repeat evaluation for liver fibrosis after 2-3 years\par - elevated ALP: f/u with PCP for further evaluation of possible bone disease.

## 2021-09-21 ENCOUNTER — APPOINTMENT (OUTPATIENT)
Dept: SURGICAL ONCOLOGY | Facility: CLINIC | Age: 61
End: 2021-09-21
Payer: COMMERCIAL

## 2021-09-21 VITALS
SYSTOLIC BLOOD PRESSURE: 156 MMHG | OXYGEN SATURATION: 97 % | HEIGHT: 62 IN | BODY MASS INDEX: 41.96 KG/M2 | DIASTOLIC BLOOD PRESSURE: 96 MMHG | WEIGHT: 228 LBS | HEART RATE: 74 BPM

## 2021-09-21 PROCEDURE — 99214 OFFICE O/P EST MOD 30 MIN: CPT

## 2021-09-21 NOTE — HISTORY OF PRESENT ILLNESS
[de-identified] : 61 year-old female presents for a yearly follow up visit.  She is status post bilateral mastectomy with bilateral MEEK reconstruction by Dr. Anmol Mcclain on 3/27/19.  Final path: 0.8cm invasive lobular carcinoma in the left breast with 7 negative LNs.  ER/MD(+) HER2(-). Tumor stage: pT1bN0; Right breast with LCIS and 5 negative LNs.  Recurrence score = 10.  She is currently taking Letrozole daily under the guidance of Dr. Mello Parr (med/onc).  Took Exemestane from 7/2019-1/2020 however stopped due to arthralgias.  Seeing hepatology for mildly elevated LFTs. \par \par Pt. reports constant soreness to the right upper inner right since surgery.  Denies any chest wall masses or pain. Denies constitutional symptoms\par

## 2021-09-21 NOTE — PHYSICAL EXAM
[Normal] : supple, no neck mass and thyroid not enlarged [Normal Supraclavicular Lymph Nodes] : normal supraclavicular lymph nodes [Normal Axillary Lymph Nodes] : normal axillary lymph nodes [Normal] : oriented to person, place and time, with appropriate affect [FreeTextEntry1] : DENISSE was present for the physical exam.  [de-identified] : Normal S1, S2. Regular rate and rhythm. [de-identified] : Complete breast exam performed in supine and upright positions. s/p b/l mastectomy with MEEK reconstruction.  Fat necrosis right reconstructed breast 12:00.  No palpable nodules or  signs of local recurrence. [de-identified] : Clear breath sounds bilaterally, normal respiratory effort.

## 2021-09-21 NOTE — ASSESSMENT
[FreeTextEntry1] : Impression:\par No evidence of new or recurrent lesions - s/p b/l mastectomy for invasive lobular carcinoma Left breast 3/2019. No suspicious lesions on exam.\par On Letrozole daily \par \par \par Plan:\par RTO annually for surveillance\par Continued follow up with Dr. Mello Parr\par

## 2021-10-13 NOTE — PATIENT PROFILE ADULT - NSPROPOAURINARYCATHETER_GEN_A_NUR
Caller: Yanet Crawley    Relationship: Self      Medication requested (name and dosage):  montelukast (SINGULAIR) 10 MG tablet   fenofibrate micronized (LOFIBRA) 134 MG capsule    Pharmacy where request should be sent: [facility] - [address] [phone] [facility] [phone]      Additional details provided by patient:     Best call back number: [phone]    Does the patient have less than a 3 day supply:  [x] Yes  [] No    [employer] Rep   10/13/21 14:54 EDT        no

## 2021-10-14 ENCOUNTER — APPOINTMENT (OUTPATIENT)
Dept: PLASTIC SURGERY | Facility: CLINIC | Age: 61
End: 2021-10-14
Payer: COMMERCIAL

## 2021-10-14 VITALS — TEMPERATURE: 97.3 F | HEIGHT: 62 IN | BODY MASS INDEX: 41.96 KG/M2 | WEIGHT: 228 LBS

## 2021-10-14 PROCEDURE — 99024 POSTOP FOLLOW-UP VISIT: CPT

## 2021-10-19 ENCOUNTER — RX RENEWAL (OUTPATIENT)
Age: 61
End: 2021-10-19

## 2021-10-21 ENCOUNTER — OUTPATIENT (OUTPATIENT)
Dept: OUTPATIENT SERVICES | Facility: HOSPITAL | Age: 61
LOS: 1 days | Discharge: HOME | End: 2021-10-21

## 2021-10-21 DIAGNOSIS — Z98.890 OTHER SPECIFIED POSTPROCEDURAL STATES: Chronic | ICD-10-CM

## 2021-10-21 DIAGNOSIS — C50.919 MALIGNANT NEOPLASM OF UNSPECIFIED SITE OF UNSPECIFIED FEMALE BREAST: ICD-10-CM

## 2021-10-21 DIAGNOSIS — M12.9 ARTHROPATHY, UNSPECIFIED: Chronic | ICD-10-CM

## 2021-10-21 DIAGNOSIS — Z90.49 ACQUIRED ABSENCE OF OTHER SPECIFIED PARTS OF DIGESTIVE TRACT: Chronic | ICD-10-CM

## 2021-10-21 DIAGNOSIS — Z90.13 ACQUIRED ABSENCE OF BILATERAL BREASTS AND NIPPLES: Chronic | ICD-10-CM

## 2021-10-21 DIAGNOSIS — Z41.9 ENCOUNTER FOR PROCEDURE FOR PURPOSES OTHER THAN REMEDYING HEALTH STATE, UNSPECIFIED: Chronic | ICD-10-CM

## 2021-10-22 ENCOUNTER — RESULT REVIEW (OUTPATIENT)
Age: 61
End: 2021-10-22

## 2021-10-22 ENCOUNTER — APPOINTMENT (OUTPATIENT)
Dept: HEMATOLOGY ONCOLOGY | Facility: CLINIC | Age: 61
End: 2021-10-22
Payer: COMMERCIAL

## 2021-10-22 VITALS
DIASTOLIC BLOOD PRESSURE: 105 MMHG | SYSTOLIC BLOOD PRESSURE: 167 MMHG | BODY MASS INDEX: 41.66 KG/M2 | RESPIRATION RATE: 16 BRPM | HEIGHT: 62.01 IN | WEIGHT: 229.28 LBS | HEART RATE: 70 BPM | TEMPERATURE: 97.7 F | OXYGEN SATURATION: 98 %

## 2021-10-22 DIAGNOSIS — R97.8 OTHER ABNORMAL TUMOR MARKERS: ICD-10-CM

## 2021-10-22 LAB
BASOPHILS # BLD AUTO: 0.04 K/UL — SIGNIFICANT CHANGE UP (ref 0–0.2)
BASOPHILS NFR BLD AUTO: 0.5 % — SIGNIFICANT CHANGE UP (ref 0–2)
EOSINOPHIL # BLD AUTO: 0.12 K/UL — SIGNIFICANT CHANGE UP (ref 0–0.5)
EOSINOPHIL NFR BLD AUTO: 1.4 % — SIGNIFICANT CHANGE UP (ref 0–6)
HCT VFR BLD CALC: 48.8 % — HIGH (ref 34.5–45)
HGB BLD-MCNC: 15.8 G/DL — HIGH (ref 11.5–15.5)
IMM GRANULOCYTES NFR BLD AUTO: 0.3 % — SIGNIFICANT CHANGE UP (ref 0–1.5)
LYMPHOCYTES # BLD AUTO: 2.21 K/UL — SIGNIFICANT CHANGE UP (ref 1–3.3)
LYMPHOCYTES # BLD AUTO: 25.5 % — SIGNIFICANT CHANGE UP (ref 13–44)
MCHC RBC-ENTMCNC: 29.4 PG — SIGNIFICANT CHANGE UP (ref 27–34)
MCHC RBC-ENTMCNC: 32.4 G/DL — SIGNIFICANT CHANGE UP (ref 32–36)
MCV RBC AUTO: 90.9 FL — SIGNIFICANT CHANGE UP (ref 80–100)
MONOCYTES # BLD AUTO: 0.54 K/UL — SIGNIFICANT CHANGE UP (ref 0–0.9)
MONOCYTES NFR BLD AUTO: 6.2 % — SIGNIFICANT CHANGE UP (ref 2–14)
NEUTROPHILS # BLD AUTO: 5.72 K/UL — SIGNIFICANT CHANGE UP (ref 1.8–7.4)
NEUTROPHILS NFR BLD AUTO: 66.1 % — SIGNIFICANT CHANGE UP (ref 43–77)
NRBC # BLD: 0 /100 WBCS — SIGNIFICANT CHANGE UP (ref 0–0)
PLATELET # BLD AUTO: 283 K/UL — SIGNIFICANT CHANGE UP (ref 150–400)
RBC # BLD: 5.37 M/UL — HIGH (ref 3.8–5.2)
RBC # FLD: 13.7 % — SIGNIFICANT CHANGE UP (ref 10.3–14.5)
WBC # BLD: 8.66 K/UL — SIGNIFICANT CHANGE UP (ref 3.8–10.5)
WBC # FLD AUTO: 8.66 K/UL — SIGNIFICANT CHANGE UP (ref 3.8–10.5)

## 2021-10-22 PROCEDURE — 99214 OFFICE O/P EST MOD 30 MIN: CPT

## 2021-10-22 NOTE — REASON FOR VISIT
[Follow-Up Visit] : a follow-up [FreeTextEntry2] : follow up for breast cancer with new onset headaches/ nausea

## 2021-10-22 NOTE — PHYSICAL EXAM
[Fully active, able to carry on all pre-disease performance without restriction] : Status 0 - Fully active, able to carry on all pre-disease performance without restriction [Normal] : affect appropriate [de-identified] : no sinus tenderness [de-identified] : bilateral mastectomy with MEEK flap reconstruction; surgical tape with gauze over nipple tattoo  [de-identified] : strength BUE and BLE 5/5; tandem walk unsteady, negative Romberg, finger to nose intact

## 2021-10-22 NOTE — ASSESSMENT
[FreeTextEntry1] : She is a 60 y/o  F with Stage IA left breast cancer s/p bilateral mastectomy with sentinel lymph node biopsy. She had been on anastrozole and exemestane with intolerable side effects. She has been on letrozole and continues to tolerate therapy. She has no new changes on exam to chest wall. We reviewed persistent headaches and will obtain MRI to rule out disease. While endocrine therapy can cause headaches, this is new onset without any new changes in medications. We will obtain tumor markers and CMP/ TSH today. Will follow up with her after MRI. Next follow up in 3 months but earlier if any new symptoms.\par \par Bone health for elevated alk phosphatase: she is taking calcium and Vitamin D. Exercise 30 min/ day.

## 2021-10-22 NOTE — REVIEW OF SYSTEMS
[Abdominal Pain] : no abdominal pain [Vomiting] : no vomiting [Constipation] : no constipation [Diarrhea] : no diarrhea [Confused] : no confusion [Dizziness] : no dizziness [Fainting] : no fainting [Difficulty Walking] : no difficulty walking [Negative] : Allergic/Immunologic [FreeTextEntry7] : nausea [de-identified] : headaches

## 2021-10-25 LAB
ALBUMIN SERPL ELPH-MCNC: 4.5 G/DL
ALP BLD-CCNC: 254 U/L
ALT SERPL-CCNC: 53 U/L
ANION GAP SERPL CALC-SCNC: 15 MMOL/L
AST SERPL-CCNC: 31 U/L
BILIRUB SERPL-MCNC: 0.4 MG/DL
BUN SERPL-MCNC: 10 MG/DL
CALCIUM SERPL-MCNC: 10.1 MG/DL
CANCER AG27-29 SERPL-ACNC: 16 U/ML
CEA SERPL-MCNC: 7.4 NG/ML
CHLORIDE SERPL-SCNC: 106 MMOL/L
CO2 SERPL-SCNC: 23 MMOL/L
CREAT SERPL-MCNC: 0.94 MG/DL
GLUCOSE SERPL-MCNC: 103 MG/DL
POTASSIUM SERPL-SCNC: 4.6 MMOL/L
PROT SERPL-MCNC: 7.2 G/DL
SODIUM SERPL-SCNC: 144 MMOL/L
TSH SERPL-ACNC: 1.86 UIU/ML
VIT B12 SERPL-MCNC: 399 PG/ML

## 2021-10-26 PROBLEM — R97.8 ELEVATED TUMOR MARKERS: Status: ACTIVE | Noted: 2021-10-26

## 2021-10-28 ENCOUNTER — APPOINTMENT (OUTPATIENT)
Dept: HEPATOLOGY | Facility: CLINIC | Age: 61
End: 2021-10-28
Payer: COMMERCIAL

## 2021-10-28 VITALS
SYSTOLIC BLOOD PRESSURE: 143 MMHG | TEMPERATURE: 97 F | BODY MASS INDEX: 42.24 KG/M2 | HEART RATE: 77 BPM | WEIGHT: 231 LBS | DIASTOLIC BLOOD PRESSURE: 68 MMHG | OXYGEN SATURATION: 99 %

## 2021-10-28 PROCEDURE — 99213 OFFICE O/P EST LOW 20 MIN: CPT

## 2021-10-28 NOTE — HISTORY OF PRESENT ILLNESS
[Fatigue] : denies fatigue [Malaise] : denies malaise [Fever] : denies fever [Diffuse Joint Pain (Arthralgias)] : arthralgias stable [Muscle Aches, Generalized (Myalgias)] : denies myalgias [Yellow Skin Or Eyes (Jaundice)] : denies jaundice [Abdominal Pain] : denies abdominal pain [Urine Tests Nonspecific Abnormal Findings Biliuria] : denies dark urine [Light Colored Bowel Movement (Acholic Stools)] : denies pale stools [Insomnia] : denies insomnia [Skin: Rash] : denies rash [Itching (Pruritus)] : denies pruritus [Shortness Of Breath] : denies shortness of breath [Needlestick Exposure] : no needlestick exposure [Infected Sexual Partner] : no infected sexual partner [IV Drug Use] : no IV drug use [Tattoo] : no tattoos [Body Piercing] : no body piercing [Hemodialysis] : no hemodialysis [Transfusion before 1992] : no transfusion before 1992 [Transplant before 1992] : no transplant before 1992 [Incarceration] : no incarceration [Alcohol Abuse] : no alcohol abuse [Autoimmune Disorder] : no autoimmune disorder [Household Contact to HBV] : no household contact to HBV [Travel to Endemic Area] : no travel to an endemic area [Occupational Exposure] : no occupational exposure [Cocaine Use] : no cocaine use [Wt Gain ___ Lbs] : no recent weight gain [Wt Loss ___ Lbs] : no recent weight loss [de-identified] : - 10/28/21: feels well, has not lost further weight. Takes semaglutide 0.5 mg/week, but the satiety it caused initially is gone. ALT now mildly elevated.\par \par - 8/31/21: returns after starting semaglutide 0.25 mg/week x 2 weeks, and 0.5 mg/week since s.c. Lost 15 lbs since last visit. Has less appetite, rarely snacks now, craving resolved. Still makes herself eat two or three meals per day out of concern she might get craving at night. Walks or bikes 3 times per week. Denies side effects.\par \par - 7/8/21: returns after bloodwork and fibroscan.\par \par - Ms. ARTHUR is a 61 year old woman with obesity s/p gastric bypass in 2004, ELI on CPAP, breast cancer s/p bilat. mastectomy 2019, asthma, referred because of elevated ALP ~200 since about 2015.\par She denies pruritus, jaundice. She has occas. joint pain in both thumbs and also trigger finger L ring finger.\par \par Weight hx: BMI 44, 242 lbs in 5/2021 - stable. Only lost 20 lbs after the bariatric surgery, from max. 268 lbs.\par Alcohol hx: occas. - 0-1 drinks per month. FHx: no liver disease \par \par Workup:\par - 7/01/21 echo: EF 68%, mild AR, endocardium not well visualized, grossly normal LV function, minimal TR. Normal pulm pressure.\par - 5/27/21 fibroscan: 5.6 kPa, 400 dB/m - no fibrosis, severe steatosis\par - US abdomen 2/16/21 ([facility]): fatty liver, no gallstones, limited exam b/o gas.

## 2021-10-28 NOTE — ASSESSMENT
[FreeTextEntry1] : Ms. ARTHUR is a 61 year old woman with obesity s/p gastric bypass in 2004, ELI on CPAP, breast cancer s/p bilat. mastectomy 2019, asthma, referred in 5/2021 because of elevated ALP ~200 since about 2015.\par She denies pruritus and jaundice. She has occas. joint pain in both thumbs and also trigger finger L ring finger.\par \par \par - mildly elevated ALP ~ 200 U/L - not due to liver disease, possibly due to bone disease. GGT 28 U/L, i.e. normal, liver isoenzyme was only 35%. Bone isoenzyme was 49% - normal proportion, but the largest proportion of the enzyme. Intestinal isoenzyme was normal, 16%.\par - severe obesity, on Ozempic since 7/2021, 0.5 mg/week - lost 15 lbs initially, only. \par - NAFLD, recently mildly elev. ALT: possibly HOWARD; without fibrosis: 5/27/21 fibroscan: 5.6 kPa, 400 dB/m - no fibrosis, severe steatosis\par - US abdomen 2/16/21 ([facility] [place]): fatty liver, no gallstones, limited exam b/o gas. No h/o signif. alcohol use.\par \par - naive to hepatitis A, B, and C.\par \par - leg edema: echo 7/01/21 essentially normal, no diastolic dysfunction or R heart disease. Cause may be venous insufficiency.\par - colonoscopy: 2014, sees Dr. Camilo Humphries\par \par I discussed that she does not need to eat additional meals to reach 2 or 3 meals per day if she does not feel hungry, and that intermittent fasting, e.g. with one meal per day, appears to have health benefits rather than being unhealthy.\par \par Plan:\par - increase semaglutide s.c. to 1.0 mg/week\par - return in 3 months. She will call if she stops losing weight before that.\par - NAFLD: weight loss, diet, exercise; will plan to repeat evaluation for liver fibrosis after 2-3 years\par - elevated ALP: f/u with PCP for further evaluation of possible bone disease.

## 2021-10-28 NOTE — CONSULT LETTER
[Dear  ___] : Dear  [unfilled], [Consult Letter:] : I had the pleasure of evaluating your patient, [unfilled]. [Please see my note below.] : Please see my note below. [Consult Closing:] : Thank you very much for allowing me to participate in the care of this patient.  If you have any questions, please do not hesitate to contact me. [Sincerely,] : Sincerely, [FreeTextEntry2] : ERIC VICKERS (MINDY)\par  [FreeTextEntry3] : Gurwinder Altamirano MD\par , [facility]\par General Hepatology and Gastroenterology\par [facility]\par [facility]\par [address] [place] [address]\[place] [address]\par [phone]\par

## 2021-10-29 ENCOUNTER — APPOINTMENT (OUTPATIENT)
Dept: MRI IMAGING | Facility: CLINIC | Age: 61
End: 2021-10-29
Payer: COMMERCIAL

## 2021-10-29 PROCEDURE — 70553 MRI BRAIN STEM W/O & W/DYE: CPT

## 2021-10-29 PROCEDURE — A9585: CPT

## 2021-10-30 ENCOUNTER — TRANSCRIPTION ENCOUNTER (OUTPATIENT)
Age: 61
End: 2021-10-30

## 2021-11-02 ENCOUNTER — NON-APPOINTMENT (OUTPATIENT)
Age: 61
End: 2021-11-02

## 2021-11-21 NOTE — ED ADULT NURSE NOTE - NSSUSCREENINGQ3_ED_ALL_ED
No
VSS in NAD non toxic appearing   NCAT EOMI PERRL OP clear no swelling   heart RRR no murmur   lungs CTA no wheezing no rales no rhonchi   abd soft NT ND no CVAT no guarding no rebound   skin with face (+) hives over cheeks and forehead, V shaped on chest

## 2021-11-22 ENCOUNTER — APPOINTMENT (OUTPATIENT)
Dept: INTERNAL MEDICINE | Facility: CLINIC | Age: 61
End: 2021-11-22
Payer: COMMERCIAL

## 2021-11-22 DIAGNOSIS — Z01.818 ENCOUNTER FOR OTHER PREPROCEDURAL EXAMINATION: ICD-10-CM

## 2021-11-22 DIAGNOSIS — Z87.898 PERSONAL HISTORY OF OTHER SPECIFIED CONDITIONS: ICD-10-CM

## 2021-11-22 PROCEDURE — 99214 OFFICE O/P EST MOD 30 MIN: CPT | Mod: 95

## 2021-11-22 NOTE — HISTORY OF PRESENT ILLNESS
[Home] : at home, [unfilled] , at the time of the visit. [Medical Office: ([facility])___] : at the medical office located in  [Verbal consent obtained from patient] : the patient, [unfilled] [de-identified] : 62 y/o female patient seen today via telehealth visit:\par - states she will be leaving tomorrow to [place] to visit brother for holiday weekend, was told will be at 5400 feet above see level, brother recommended taking medication for altitude sickness \par - hx of asthma/ELI on CPAP - stable cont current medications as prescribed, must take on vacation

## 2021-11-22 NOTE — PHYSICAL EXAM
[No JVD] : no jugular venous distention [No Respiratory Distress] : no respiratory distress  [No Accessory Muscle Use] : no accessory muscle use [Normal Rate] : normal rate  [Normal] : no rash

## 2021-12-06 ENCOUNTER — RESULT REVIEW (OUTPATIENT)
Age: 61
End: 2021-12-06

## 2021-12-08 ENCOUNTER — APPOINTMENT (OUTPATIENT)
Dept: CT IMAGING | Facility: IMAGING CENTER | Age: 61
End: 2021-12-08
Payer: COMMERCIAL

## 2021-12-08 ENCOUNTER — OUTPATIENT (OUTPATIENT)
Dept: OUTPATIENT SERVICES | Facility: HOSPITAL | Age: 61
LOS: 1 days | End: 2021-12-08
Payer: COMMERCIAL

## 2021-12-08 ENCOUNTER — RX RENEWAL (OUTPATIENT)
Age: 61
End: 2021-12-08

## 2021-12-08 DIAGNOSIS — Z98.890 OTHER SPECIFIED POSTPROCEDURAL STATES: Chronic | ICD-10-CM

## 2021-12-08 DIAGNOSIS — R97.8 OTHER ABNORMAL TUMOR MARKERS: ICD-10-CM

## 2021-12-08 DIAGNOSIS — Z41.9 ENCOUNTER FOR PROCEDURE FOR PURPOSES OTHER THAN REMEDYING HEALTH STATE, UNSPECIFIED: Chronic | ICD-10-CM

## 2021-12-08 DIAGNOSIS — M12.9 ARTHROPATHY, UNSPECIFIED: Chronic | ICD-10-CM

## 2021-12-08 DIAGNOSIS — Z00.8 ENCOUNTER FOR OTHER GENERAL EXAMINATION: ICD-10-CM

## 2021-12-08 DIAGNOSIS — Z90.13 ACQUIRED ABSENCE OF BILATERAL BREASTS AND NIPPLES: Chronic | ICD-10-CM

## 2021-12-08 DIAGNOSIS — R74.8 ABNORMAL LEVELS OF OTHER SERUM ENZYMES: ICD-10-CM

## 2021-12-08 DIAGNOSIS — Z90.49 ACQUIRED ABSENCE OF OTHER SPECIFIED PARTS OF DIGESTIVE TRACT: Chronic | ICD-10-CM

## 2021-12-08 PROCEDURE — 71260 CT THORAX DX C+: CPT | Mod: 26

## 2021-12-08 PROCEDURE — 82565 ASSAY OF CREATININE: CPT

## 2021-12-08 PROCEDURE — 74177 CT ABD & PELVIS W/CONTRAST: CPT | Mod: 26

## 2021-12-08 PROCEDURE — 71260 CT THORAX DX C+: CPT

## 2021-12-08 PROCEDURE — 74177 CT ABD & PELVIS W/CONTRAST: CPT

## 2021-12-14 ENCOUNTER — NON-APPOINTMENT (OUTPATIENT)
Age: 61
End: 2021-12-14

## 2021-12-21 ENCOUNTER — OUTPATIENT (OUTPATIENT)
Dept: OUTPATIENT SERVICES | Facility: HOSPITAL | Age: 61
LOS: 1 days | End: 2021-12-21
Payer: COMMERCIAL

## 2021-12-21 ENCOUNTER — APPOINTMENT (OUTPATIENT)
Dept: NUCLEAR MEDICINE | Facility: IMAGING CENTER | Age: 61
End: 2021-12-21
Payer: COMMERCIAL

## 2021-12-21 DIAGNOSIS — Z00.8 ENCOUNTER FOR OTHER GENERAL EXAMINATION: ICD-10-CM

## 2021-12-21 DIAGNOSIS — Z90.13 ACQUIRED ABSENCE OF BILATERAL BREASTS AND NIPPLES: Chronic | ICD-10-CM

## 2021-12-21 DIAGNOSIS — Z90.49 ACQUIRED ABSENCE OF OTHER SPECIFIED PARTS OF DIGESTIVE TRACT: Chronic | ICD-10-CM

## 2021-12-21 DIAGNOSIS — R74.8 ABNORMAL LEVELS OF OTHER SERUM ENZYMES: ICD-10-CM

## 2021-12-21 DIAGNOSIS — C50.512 MALIGNANT NEOPLASM OF LOWER-OUTER QUADRANT OF LEFT FEMALE BREAST: ICD-10-CM

## 2021-12-21 DIAGNOSIS — M12.9 ARTHROPATHY, UNSPECIFIED: Chronic | ICD-10-CM

## 2021-12-21 DIAGNOSIS — Z41.9 ENCOUNTER FOR PROCEDURE FOR PURPOSES OTHER THAN REMEDYING HEALTH STATE, UNSPECIFIED: Chronic | ICD-10-CM

## 2021-12-21 DIAGNOSIS — Z98.890 OTHER SPECIFIED POSTPROCEDURAL STATES: Chronic | ICD-10-CM

## 2021-12-21 DIAGNOSIS — R97.8 OTHER ABNORMAL TUMOR MARKERS: ICD-10-CM

## 2021-12-21 PROCEDURE — 78306 BONE IMAGING WHOLE BODY: CPT

## 2021-12-21 PROCEDURE — A9561: CPT

## 2021-12-21 PROCEDURE — 78306 BONE IMAGING WHOLE BODY: CPT | Mod: 26

## 2021-12-28 ENCOUNTER — NON-APPOINTMENT (OUTPATIENT)
Age: 61
End: 2021-12-28

## 2022-01-27 ENCOUNTER — OUTPATIENT (OUTPATIENT)
Dept: OUTPATIENT SERVICES | Facility: HOSPITAL | Age: 62
LOS: 1 days | Discharge: HOME | End: 2022-01-27

## 2022-01-27 DIAGNOSIS — C50.919 MALIGNANT NEOPLASM OF UNSPECIFIED SITE OF UNSPECIFIED FEMALE BREAST: ICD-10-CM

## 2022-01-27 DIAGNOSIS — Z90.49 ACQUIRED ABSENCE OF OTHER SPECIFIED PARTS OF DIGESTIVE TRACT: Chronic | ICD-10-CM

## 2022-01-27 DIAGNOSIS — Z41.9 ENCOUNTER FOR PROCEDURE FOR PURPOSES OTHER THAN REMEDYING HEALTH STATE, UNSPECIFIED: Chronic | ICD-10-CM

## 2022-01-27 DIAGNOSIS — Z98.890 OTHER SPECIFIED POSTPROCEDURAL STATES: Chronic | ICD-10-CM

## 2022-01-27 DIAGNOSIS — M12.9 ARTHROPATHY, UNSPECIFIED: Chronic | ICD-10-CM

## 2022-01-27 DIAGNOSIS — Z90.13 ACQUIRED ABSENCE OF BILATERAL BREASTS AND NIPPLES: Chronic | ICD-10-CM

## 2022-01-28 ENCOUNTER — APPOINTMENT (OUTPATIENT)
Dept: HEMATOLOGY ONCOLOGY | Facility: CLINIC | Age: 62
End: 2022-01-28
Payer: COMMERCIAL

## 2022-01-28 VITALS
HEIGHT: 62.01 IN | OXYGEN SATURATION: 97 % | TEMPERATURE: 97.2 F | SYSTOLIC BLOOD PRESSURE: 164 MMHG | BODY MASS INDEX: 40.06 KG/M2 | WEIGHT: 220.46 LBS | DIASTOLIC BLOOD PRESSURE: 87 MMHG | RESPIRATION RATE: 16 BRPM | HEART RATE: 81 BPM

## 2022-01-28 PROCEDURE — 99214 OFFICE O/P EST MOD 30 MIN: CPT

## 2022-01-28 RX ORDER — PREDNISONE 20 MG/1
20 TABLET ORAL DAILY
Qty: 5 | Refills: 0 | Status: DISCONTINUED | COMMUNITY
Start: 2021-11-22 | End: 2022-01-28

## 2022-01-28 RX ORDER — VIT C/HESPERIDIN/BIOFLAVONOIDS 500-100 MG
30 TABLET ORAL
Refills: 0 | Status: ACTIVE | COMMUNITY
Start: 2022-01-28

## 2022-01-28 RX ORDER — MULTIVITAMIN
TABLET ORAL
Refills: 0 | Status: ACTIVE | COMMUNITY
Start: 2022-01-28

## 2022-01-28 RX ORDER — ACETAZOLAMIDE 250 MG/1
250 TABLET ORAL
Qty: 20 | Refills: 0 | Status: DISCONTINUED | COMMUNITY
Start: 2021-11-22 | End: 2022-01-28

## 2022-01-28 RX ORDER — TRIAMCINOLONE ACETONIDE 1 MG/G
0.1 OINTMENT TOPICAL TWICE DAILY
Qty: 1 | Refills: 0 | Status: DISCONTINUED | COMMUNITY
Start: 2021-07-29 | End: 2022-01-28

## 2022-01-28 RX ORDER — OXYCODONE AND ACETAMINOPHEN 5; 325 MG/1; MG/1
5-325 TABLET ORAL
Qty: 15 | Refills: 0 | Status: DISCONTINUED | COMMUNITY
Start: 2021-07-20 | End: 2022-01-28

## 2022-01-28 NOTE — ASSESSMENT
[FreeTextEntry1] : She is a 61 y/o  F with Stage IA left breast cancer s/p bilateral mastectomy with sentinel lymph node biopsy. She had been on anastrozole and exemestane with intolerable side effects. She has been on letrozole and continues to tolerate therapy. We reviewed CT/ bone scan/ MRI brain work up which is negative. She has pulmonary nodule that is chronic and has been seen by pulmonologist. She will continue with letrozole along with surveillance. Copies of blood work and scans given to pt. We reviewed calcium and Vitamin D supplementation along with exercise to maintain bone health. Will have bone density this May. Next follow up in 4 months but earlier if any new symptoms.\par \par \par HTN: she will use BP machine and chart BP at home and bring to PCP the numbers and machine for evaluation.  No

## 2022-01-28 NOTE — PHYSICAL EXAM
[Fully active, able to carry on all pre-disease performance without restriction] : Status 0 - Fully active, able to carry on all pre-disease performance without restriction [Normal] : affect appropriate [de-identified] : no sinus tenderness [de-identified] : bilateral mastectomy with MEEK flap reconstruction; surgical tape with gauze over nipple tattoo  [de-identified] : strength BUE and BLE 5/5; tandem walk unsteady, negative Romberg, finger to nose intact

## 2022-01-28 NOTE — HISTORY OF PRESENT ILLNESS
[Disease: _____________________] : Disease: [unfilled] [T: ___] : T[unfilled] [N: ___] : N[unfilled] [AJCC Stage: ____] : AJCC Stage: [unfilled] [de-identified] : Age 59: left breast cancer \par Screening mammogram on 12/16/2018 showed focal asymmetry in the posterior central lateral left breast and additional imaging showing 7 mm spiculated mass at the left 5:00 area. She had left breast biopsy which showed invasive mammary carcinoma with lobular features ER >90%, WV 24.5%, [id number] negative and LCIS. MRI of the breast showed biopsy proven nonmass enhancement along with left breast 4:00 enhancement and 9:00 enhancement. She had MRI guided biopsy of the L 4:00 area that showed mild proliferative fibrocystic changes and 9:00 area that showed LCIS and fibrocystic changes. On 3/27/19, she underwent R prophylactic mastectomy with SLN biopsy and L mastectomy and sentinel lymph node biopsy with Dr Disla. The pathology showed R breast LCIS and L left mastectomy showed invasive lobular carcinoma and extensive LCIS classic type with extensive ductal extension and negative sentinel lymph nodes. OncotypeDx score was 10. She had MEEK flap reconstruction with Dr Mcclain. She had issues with diarrhea and hair loss with anastrozole and was switched to exemestane. She developed worsening arthralgias from the exemestane: unable to open and abduct her thumbs only improved by cortisone injection. She started trial of letrozole on 5/2020.   [de-identified] : invasive lobular carcinoma ER >90%, ID 24.5%, [id number] negative  [de-identified] : exemestane 7/26/19 to 1/2020 stopped due to arthralgias\par Invitae 11/13/19 \par letrozole 5/2020 to present  [de-identified] : She has lost 30 lbs with diet. She still has high BP: will be seeing PCP soon. She has bought BP machine for home but has not used yet. Has occasional headache over the front of her head and dry heaving. Denies any medications or relation to food. She overall is feeling better. Her visits to other physicians: recommendation to see PCP. She denies any new chest wall changes. Feels the letrozole is the best out of all three meds: feels hand joints are OK and the hair loss is not significant. Takes vitamins: iron, zinc, calcium, MVI, omega 3 along with her other meds.

## 2022-01-28 NOTE — REVIEW OF SYSTEMS
[Abdominal Pain] : no abdominal pain [Vomiting] : no vomiting [Constipation] : no constipation [Diarrhea] : no diarrhea [Confused] : no confusion [Dizziness] : no dizziness [Fainting] : no fainting [Difficulty Walking] : no difficulty walking [Negative] : Allergic/Immunologic [FreeTextEntry7] : dry heaves occasional  [de-identified] : headaches occasional

## 2022-01-31 ENCOUNTER — NON-APPOINTMENT (OUTPATIENT)
Age: 62
End: 2022-01-31

## 2022-02-02 ENCOUNTER — APPOINTMENT (OUTPATIENT)
Dept: INTERNAL MEDICINE | Facility: CLINIC | Age: 62
End: 2022-02-02
Payer: COMMERCIAL

## 2022-02-02 VITALS
HEART RATE: 80 BPM | BODY MASS INDEX: 40.48 KG/M2 | TEMPERATURE: 97 F | WEIGHT: 220 LBS | OXYGEN SATURATION: 96 % | DIASTOLIC BLOOD PRESSURE: 87 MMHG | HEIGHT: 62 IN | SYSTOLIC BLOOD PRESSURE: 176 MMHG

## 2022-02-02 DIAGNOSIS — Z23 ENCOUNTER FOR IMMUNIZATION: ICD-10-CM

## 2022-02-02 DIAGNOSIS — R51.9 HEADACHE, UNSPECIFIED: ICD-10-CM

## 2022-02-02 PROCEDURE — 90471 IMMUNIZATION ADMIN: CPT

## 2022-02-02 PROCEDURE — 99214 OFFICE O/P EST MOD 30 MIN: CPT | Mod: 25

## 2022-02-02 PROCEDURE — 90750 HZV VACC RECOMBINANT IM: CPT

## 2022-02-22 PROBLEM — R51.9 PERSISTENT HEADACHES: Status: ACTIVE | Noted: 2021-10-22

## 2022-02-22 PROBLEM — Z23 ENCOUNTER FOR IMMUNIZATION: Status: ACTIVE | Noted: 2021-08-31

## 2022-02-22 NOTE — REVIEW OF SYSTEMS
[Abdominal Pain] : no abdominal pain [Nausea] : nausea [Constipation] : no constipation [Diarrhea] : diarrhea [Vomiting] : no vomiting [Heartburn] : no heartburn [Headache] : headache [Dizziness] : no dizziness [Fainting] : no fainting [Memory Loss] : no memory loss [Confusion] : no confusion [Negative] : Psychiatric

## 2022-02-22 NOTE — PHYSICAL EXAM
[Normal] : affect was normal and insight and judgment were intact [de-identified] : +trace edema LE B/L

## 2022-02-22 NOTE — COUNSELING
[Potential consequences of obesity discussed] : Potential consequences of obesity discussed [Benefits of weight loss discussed] : Benefits of weight loss discussed [Encouraged to increase physical activity] : Encouraged to increase physical activity [Weigh Self Weekly] : weigh self weekly [Good understanding] : Patient has a good understanding of disease, goals and obesity follow-up plan

## 2022-03-11 ENCOUNTER — APPOINTMENT (OUTPATIENT)
Dept: INTERNAL MEDICINE | Facility: CLINIC | Age: 62
End: 2022-03-11
Payer: COMMERCIAL

## 2022-03-11 VITALS
HEIGHT: 62 IN | HEART RATE: 80 BPM | WEIGHT: 220.19 LBS | DIASTOLIC BLOOD PRESSURE: 81 MMHG | OXYGEN SATURATION: 96 % | TEMPERATURE: 98 F | BODY MASS INDEX: 40.52 KG/M2 | SYSTOLIC BLOOD PRESSURE: 131 MMHG

## 2022-03-11 PROCEDURE — 99396 PREV VISIT EST AGE 40-64: CPT | Mod: 25

## 2022-03-11 PROCEDURE — 36415 COLL VENOUS BLD VENIPUNCTURE: CPT

## 2022-03-11 NOTE — HISTORY OF PRESENT ILLNESS
[de-identified] : 63 y/o female patient presents today for annual CPE/fasting labs:\par - HCM: last PAP - 03/2021 WNL, colonoscopy - 06/2021 repeat in 7-10 years\par - f/u HTN - well controlled today in office, confirmed with BP monitor accurate discussed proper placement when taking at home, denies any CP/SOB/Palpitations/edema

## 2022-03-11 NOTE — HEALTH RISK ASSESSMENT
[Former] : Former [5-9] : 5-9 [Yes] : Yes [No] : In the past 12 months have you used drugs other than those required for medical reasons? No [0] : 2) Feeling down, depressed, or hopeless: Not at all (0) [PHQ-2 Negative - No further assessment needed] : PHQ-2 Negative - No further assessment needed [de-identified] : 1 pack per week x 15 years [YearQuit] : Quit age 36  [DSL4Yllek] : 0 [Patient reported PAP Smear was normal] : Patient reported PAP Smear was normal [Patient reported colonoscopy was normal] : Patient reported colonoscopy was normal [Change in mental status noted] : No change in mental status noted [None] : None [With Family] : lives with family [Employed] : employed [Sexually Active] : sexually active [High Risk Behavior] : no high risk behavior [Feels Safe at Home] : Feels safe at home [Reports changes in hearing] : Reports no changes in hearing [Reports changes in vision] : Reports no changes in vision [Reports changes in dental health] : Reports no changes in dental health [PapSmearDate] : 3/2021 [ColonoscopyDate] : 6/2021

## 2022-03-25 ENCOUNTER — TRANSCRIPTION ENCOUNTER (OUTPATIENT)
Age: 62
End: 2022-03-25

## 2022-03-25 LAB
25(OH)D3 SERPL-MCNC: 39.4 NG/ML
ALBUMIN SERPL ELPH-MCNC: 4.3 G/DL
ALP BLD-CCNC: 220 U/L
ALT SERPL-CCNC: 39 U/L
ANION GAP SERPL CALC-SCNC: 16 MMOL/L
APPEARANCE: CLEAR
AST SERPL-CCNC: 25 U/L
BACTERIA: NEGATIVE
BASOPHILS # BLD AUTO: 0.06 K/UL
BASOPHILS NFR BLD AUTO: 0.6 %
BILIRUB SERPL-MCNC: 0.4 MG/DL
BILIRUBIN URINE: NEGATIVE
BLOOD URINE: NEGATIVE
BUN SERPL-MCNC: 21 MG/DL
CALCIUM SERPL-MCNC: 9.5 MG/DL
CHLORIDE SERPL-SCNC: 105 MMOL/L
CHOLEST SERPL-MCNC: 180 MG/DL
CO2 SERPL-SCNC: 22 MMOL/L
COLOR: YELLOW
CREAT SERPL-MCNC: 1.15 MG/DL
EGFR: 54 ML/MIN/1.73M2
EOSINOPHIL # BLD AUTO: 0.29 K/UL
EOSINOPHIL NFR BLD AUTO: 3.1 %
ESTIMATED AVERAGE GLUCOSE: 103 MG/DL
GLUCOSE QUALITATIVE U: NEGATIVE
GLUCOSE SERPL-MCNC: 98 MG/DL
HBA1C MFR BLD HPLC: 5.2 %
HCT VFR BLD CALC: 44.3 %
HDLC SERPL-MCNC: 70 MG/DL
HGB BLD-MCNC: 14.2 G/DL
HYALINE CASTS: 0 /LPF
IMM GRANULOCYTES NFR BLD AUTO: 0.4 %
KETONES URINE: NEGATIVE
LDLC SERPL CALC-MCNC: 94 MG/DL
LEUKOCYTE ESTERASE URINE: ABNORMAL
LYMPHOCYTES # BLD AUTO: 2.26 K/UL
LYMPHOCYTES NFR BLD AUTO: 24.1 %
MAGNESIUM SERPL-MCNC: 2.1 MG/DL
MAN DIFF?: NORMAL
MCHC RBC-ENTMCNC: 29.3 PG
MCHC RBC-ENTMCNC: 32.1 GM/DL
MCV RBC AUTO: 91.3 FL
MICROSCOPIC-UA: NORMAL
MONOCYTES # BLD AUTO: 0.75 K/UL
MONOCYTES NFR BLD AUTO: 8 %
NEUTROPHILS # BLD AUTO: 5.99 K/UL
NEUTROPHILS NFR BLD AUTO: 63.8 %
NITRITE URINE: NEGATIVE
NONHDLC SERPL-MCNC: 111 MG/DL
PH URINE: 5.5
PLATELET # BLD AUTO: 299 K/UL
POTASSIUM SERPL-SCNC: 4.4 MMOL/L
PROT SERPL-MCNC: 7 G/DL
PROTEIN URINE: NEGATIVE
RBC # BLD: 4.85 M/UL
RBC # FLD: 13.7 %
RED BLOOD CELLS URINE: 9 /HPF
SODIUM SERPL-SCNC: 143 MMOL/L
SPECIFIC GRAVITY URINE: 1.02
SQUAMOUS EPITHELIAL CELLS: 1 /HPF
TRIGL SERPL-MCNC: 84 MG/DL
TSH SERPL-ACNC: 1.97 UIU/ML
UROBILINOGEN URINE: NORMAL
VIT B12 SERPL-MCNC: 339 PG/ML
WBC # FLD AUTO: 9.39 K/UL
WHITE BLOOD CELLS URINE: 3 /HPF

## 2022-05-02 ENCOUNTER — RX RENEWAL (OUTPATIENT)
Age: 62
End: 2022-05-02

## 2022-05-24 ENCOUNTER — OUTPATIENT (OUTPATIENT)
Dept: OUTPATIENT SERVICES | Facility: HOSPITAL | Age: 62
LOS: 1 days | Discharge: HOME | End: 2022-05-24

## 2022-05-24 DIAGNOSIS — M12.9 ARTHROPATHY, UNSPECIFIED: Chronic | ICD-10-CM

## 2022-05-24 DIAGNOSIS — Z90.13 ACQUIRED ABSENCE OF BILATERAL BREASTS AND NIPPLES: Chronic | ICD-10-CM

## 2022-05-24 DIAGNOSIS — Z41.9 ENCOUNTER FOR PROCEDURE FOR PURPOSES OTHER THAN REMEDYING HEALTH STATE, UNSPECIFIED: Chronic | ICD-10-CM

## 2022-05-24 DIAGNOSIS — Z90.49 ACQUIRED ABSENCE OF OTHER SPECIFIED PARTS OF DIGESTIVE TRACT: Chronic | ICD-10-CM

## 2022-05-24 DIAGNOSIS — Z98.890 OTHER SPECIFIED POSTPROCEDURAL STATES: Chronic | ICD-10-CM

## 2022-05-24 DIAGNOSIS — C50.919 MALIGNANT NEOPLASM OF UNSPECIFIED SITE OF UNSPECIFIED FEMALE BREAST: ICD-10-CM

## 2022-05-27 ENCOUNTER — APPOINTMENT (OUTPATIENT)
Dept: HEMATOLOGY ONCOLOGY | Facility: CLINIC | Age: 62
End: 2022-05-27
Payer: COMMERCIAL

## 2022-05-27 VITALS
BODY MASS INDEX: 40.89 KG/M2 | HEIGHT: 61.54 IN | RESPIRATION RATE: 16 BRPM | SYSTOLIC BLOOD PRESSURE: 116 MMHG | WEIGHT: 219.36 LBS | TEMPERATURE: 97.3 F | OXYGEN SATURATION: 97 % | HEART RATE: 77 BPM | DIASTOLIC BLOOD PRESSURE: 74 MMHG

## 2022-05-27 PROCEDURE — 99213 OFFICE O/P EST LOW 20 MIN: CPT

## 2022-05-27 NOTE — PHYSICAL EXAM
[Fully active, able to carry on all pre-disease performance without restriction] : Status 0 - Fully active, able to carry on all pre-disease performance without restriction [Normal] : affect appropriate [de-identified] : no sinus tenderness [de-identified] : bilateral mastectomy with MEEK flap reconstruction; surgical tape with gauze over nipple tattoo

## 2022-05-27 NOTE — ASSESSMENT
[FreeTextEntry1] : She is a 61 y/o  F with Stage IA left breast cancer s/p bilateral mastectomy with sentinel lymph node biopsy. She had been on anastrozole and exemestane with intolerable side effects. She has been on letrozole and continues to tolerate therapy since 5/2020. We reviewed blood work done with PCP with elevated alk phos which has been since last year and bone scan negative 12/2021. We reviewed calcium and Vitamin D supplementation along with exercise to maintain bone health. Will obtain copy of bone density to evaluate osteopenia. We reviewed signs and symptoms of breast cancer recurrence. No new signs or symptoms of recurrence. Next follow up in 6 months but earlier if any new symptoms.\par

## 2022-05-27 NOTE — HISTORY OF PRESENT ILLNESS
[Disease: _____________________] : Disease: [unfilled] [T: ___] : T[unfilled] [N: ___] : N[unfilled] [AJCC Stage: ____] : AJCC Stage: [unfilled] [de-identified] : Age 59: left breast cancer \par Screening mammogram on 12/16/2018 showed focal asymmetry in the posterior central lateral left breast and additional imaging showing 7 mm spiculated mass at the left 5:00 area. She had left breast biopsy which showed invasive mammary carcinoma with lobular features ER >90%, AL 24.5%, [id number] negative and LCIS. MRI of the breast showed biopsy proven nonmass enhancement along with left breast 4:00 enhancement and 9:00 enhancement. She had MRI guided biopsy of the L 4:00 area that showed mild proliferative fibrocystic changes and 9:00 area that showed LCIS and fibrocystic changes. On 3/27/19, she underwent R prophylactic mastectomy with SLN biopsy and L mastectomy and sentinel lymph node biopsy with Dr Disla. The pathology showed R breast LCIS and L left mastectomy showed invasive lobular carcinoma and extensive LCIS classic type with extensive ductal extension and negative sentinel lymph nodes. OncotypeDx score was 10. She had MEEK flap reconstruction with Dr Mcclain. She had issues with diarrhea and hair loss with anastrozole and was switched to exemestane. She developed worsening arthralgias from the exemestane: unable to open and abduct her thumbs only improved by cortisone injection. She started trial of letrozole on 5/2020.   [de-identified] : invasive lobular carcinoma ER >90%, CA 24.5%, [id number] negative  [de-identified] : exemestane 7/26/19 to 1/2020 stopped due to arthralgias\par Invitae 11/13/19 \par letrozole 5/2020 to present  [de-identified] : She continues to feel well since last visit. She saw her PCP and has new BP medication: losartan/ HCTz. She denies any new back pain or cough or HA. She had her bone density done few weeks ago. No new chest wall pain or changes.

## 2022-06-01 ENCOUNTER — NON-APPOINTMENT (OUTPATIENT)
Age: 62
End: 2022-06-01

## 2022-06-06 ENCOUNTER — NON-APPOINTMENT (OUTPATIENT)
Age: 62
End: 2022-06-06

## 2022-06-14 ENCOUNTER — APPOINTMENT (OUTPATIENT)
Dept: HEPATOLOGY | Facility: CLINIC | Age: 62
End: 2022-06-14

## 2022-06-14 VITALS
TEMPERATURE: 98.1 F | OXYGEN SATURATION: 98 % | BODY MASS INDEX: 41.35 KG/M2 | SYSTOLIC BLOOD PRESSURE: 140 MMHG | WEIGHT: 219 LBS | DIASTOLIC BLOOD PRESSURE: 65 MMHG | HEIGHT: 61 IN | HEART RATE: 76 BPM

## 2022-06-14 NOTE — HISTORY OF PRESENT ILLNESS
[Needlestick Exposure] : no needlestick exposure [Infected Sexual Partner] : no infected sexual partner [IV Drug Use] : no IV drug use [Tattoo] : no tattoos [Body Piercing] : no body piercing [Hemodialysis] : no hemodialysis [Transfusion before 1992] : no transfusion before 1992 [Transplant before 1992] : no transplant before 1992 [Incarceration] : no incarceration [Alcohol Abuse] : no alcohol abuse [Autoimmune Disorder] : no autoimmune disorder [Household Contact to HBV] : no household contact to HBV [Travel to Endemic Area] : no travel to an endemic area [Occupational Exposure] : no occupational exposure [Cocaine Use] : no cocaine use [Fatigue] : denies fatigue [Malaise] : denies malaise [Fever] : denies fever [Diffuse Joint Pain (Arthralgias)] : arthralgias stable [Muscle Aches, Generalized (Myalgias)] : denies myalgias [Yellow Skin Or Eyes (Jaundice)] : denies jaundice [Abdominal Pain] : denies abdominal pain [Urine Tests Nonspecific Abnormal Findings Biliuria] : denies dark urine [Light Colored Bowel Movement (Acholic Stools)] : denies pale stools [Insomnia] : denies insomnia [Skin: Rash] : denies rash [Itching (Pruritus)] : denies pruritus [Shortness Of Breath] : denies shortness of breath [Wt Gain ___ Lbs] : no recent weight gain [Wt Loss ___ Lbs] : no recent weight loss [de-identified] : - 10/28/21: feels well, has not lost further weight. Takes semaglutide 0.5 mg/week, but the satiety it caused initially is gone. ALT now mildly elevated.\par \par - 8/31/21: returns after starting semaglutide 0.25 mg/week x 2 weeks, and 0.5 mg/week since s.c. Lost 15 lbs since last visit. Has less appetite, rarely snacks now, craving resolved. Still makes herself eat two or three meals per day out of concern she might get craving at night. Walks or bikes 3 times per week. Denies side effects.\par \par - 7/8/21: returns after bloodwork and fibroscan.\par \par - Ms. ARTHUR is a 61 year old woman with obesity s/p gastric bypass in 2004, ELI on CPAP, breast cancer s/p bilat. mastectomy 2019, asthma, referred because of elevated ALP ~200 since about 2015.\par She denies pruritus, jaundice. She has occas. joint pain in both thumbs and also trigger finger L ring finger.\par \par Weight hx: BMI 44, 242 lbs in 5/2021 - stable. Only lost 20 lbs after the bariatric surgery, from max. 268 lbs.\par Alcohol hx: occas. - 0-1 drinks per month. FHx: no liver disease \par \par Workup:\par - 7/01/21 echo: EF 68%, mild AR, endocardium not well visualized, grossly normal LV function, minimal TR. Normal pulm pressure.\par - 5/27/21 fibroscan: 5.6 kPa, 400 dB/m - no fibrosis, severe steatosis\par - US abdomen 2/16/21 ([facility]): fatty liver, no gallstones, limited exam b/o gas.

## 2022-06-14 NOTE — ASSESSMENT
[FreeTextEntry1] : Ms. ARTHUR is a 62 year old woman with obesity s/p gastric bypass in 2004, ELI on CPAP, breast cancer s/p bilateral mastectomy 2019, asthma, referred in 5/2021 because of elevated ALP ~200 since about 2015.\par She denies pruritus and jaundice. She has occas. joint pain in both thumbs and also trigger finger L ring finger.\par \par \par - mildly elevated ALP ~ 200 U/L - not due to liver disease, possibly due to bone disease, as GGT 28 U/L, i.e. normal. Liver isoenzyme was only 35%. Bone isoenzyme was 49% - normal proportion, but the largest proportion of the enzyme. Intestinal isoenzyme was normal, 16%.\par - severe obesity, on Ozempic since 7/2021, 0.5 mg/week - lost 15 lbs initially, only. \par - NAFLD, recently mildly elev. ALT: possibly HOWARD; without fibrosis: 5/27/21 fibroscan: 5.6 kPa, 400 dB/m - no fibrosis, severe steatosis\par - US abdomen 2/16/21 ([facility]): fatty liver, no gallstones, limited exam b/o gas. No h/o signif. alcohol use.\par \par - naive to hepatitis A, B, and C.\par \par - leg edema: echo 7/01/21 essentially normal, no diastolic dysfunction or R heart disease. Cause may be venous insufficiency.\par - colonoscopy: 2014, sees Dr. Camilo Humphries\par \par I discussed that she does not need to eat additional meals to reach 2 or 3 meals per day if she does not feel hungry, and that intermittent fasting, e.g. with one meal per day, appears to have health benefits rather than being unhealthy.\par \par Plan:\par - increase semaglutide s.c. to 1.0 mg/week\par - return in 3 months. She will call if she stops losing weight before that.\par - NAFLD: weight loss, diet, exercise; will plan to repeat evaluation for liver fibrosis after 2-3 years\par - elevated ALP: f/u with PCP for further evaluation of possible bone disease.

## 2022-06-14 NOTE — CONSULT LETTER
[Dear  ___] : Dear  [unfilled], [Consult Letter:] : I had the pleasure of evaluating your patient, [unfilled]. [Please see my note below.] : Please see my note below. [Consult Closing:] : Thank you very much for allowing me to participate in the care of this patient.  If you have any questions, please do not hesitate to contact me. [Sincerely,] : Sincerely, [FreeTextEntry2] : ERIC VICKERS (MINDY)\par  [FreeTextEntry3] : Gurwinder Altamirano MD\par , [facility]\par General Hepatology and Gastroenterology\par [facility]\par [facility]\par [address] [place] [address]\[place] [address]\par [phone]\par

## 2022-06-17 ENCOUNTER — NON-APPOINTMENT (OUTPATIENT)
Age: 62
End: 2022-06-17

## 2022-06-20 ENCOUNTER — RX RENEWAL (OUTPATIENT)
Age: 62
End: 2022-06-20

## 2022-07-06 NOTE — BRIEF OPERATIVE NOTE - ASSISTANT(S)
Nutrition Services    Patient Name:  Jovita Camara  YOB: 1967  MRN: [id number]  Admit Date:  7/6/2022    This medical referred consult was provided via telehealth per patient request. Consent for treatment was given verbally. MARCI spent a total of 30 minutes with patient today.     Patient reports via telephone for her follow up visit today. She states that she has continued to make good progress over the past 6 weeks. She has hit a barrier with her physical activity as she injured her knee, currently on crutches with brace. She states that she is putting weight on foot though so continuing to walk. She is sticking with her healthier alternatives for snacks such as a handful of nuts, cheese stick or pepperoni stick, as well as pure protein bars. She is drinking a good amount of water. Taking lean cuisines for lunch. Focusing on protein, she notes that she notices such a difference in her satiety when she is consuming more protein. Trying new recipes at home, plans to revisit the [employer] for recipes. She states that she weighed in at 341 lb this morning. She has lost 7 lb since her previous follow up on 6/2/2022 and a total of 24 lb weight loss since initial visit on 3/7/2022. She is motivated to continue making these changes to see the results that she wishes to achieve. We have scheduled a continued follow up for 8/17/2022 @ 12:00 PM. Thank you for this referral.     Intervention Goal(s):  1. Attempt to incorporate more balance into meal times with plate method as guide -75% met   2. Healthful weight loss-100% met (7 lb down; 341 lb current weight)    Electronically signed by:  Candida Gomez RD  07/06/22 12:31 EDT    Carl Salcido

## 2022-07-27 ENCOUNTER — RX RENEWAL (OUTPATIENT)
Age: 62
End: 2022-07-27

## 2022-08-29 ENCOUNTER — APPOINTMENT (OUTPATIENT)
Dept: HEPATOLOGY | Facility: CLINIC | Age: 62
End: 2022-08-29

## 2022-08-29 DIAGNOSIS — R74.8 ABNORMAL LEVELS OF OTHER SERUM ENZYMES: ICD-10-CM

## 2022-08-29 DIAGNOSIS — K75.81 NONALCOHOLIC STEATOHEPATITIS (NASH): ICD-10-CM

## 2022-08-29 PROCEDURE — 99213 OFFICE O/P EST LOW 20 MIN: CPT | Mod: 95

## 2022-08-29 NOTE — ASSESSMENT
[FreeTextEntry1] : Ms. ARTHUR is a 62 year old woman with obesity s/p gastric bypass in 2004, ELI on CPAP, breast cancer s/p bilat. mastectomy 2019, asthma, referred in 5/2021 because of elevated ALP ~200 since about 2015.\par She denies pruritus and jaundice. She has occas. joint pain in both thumbs and also trigger finger L ring finger.\par \par \par - elevated ALP - not due to liver disease, likely due to bone disease. GGT 28 U/L, i.e. normal, liver isoenzyme was only 35%. Bone isoenzyme was 49% - normal proportion, but the largest proportion of the enzyme. Intestinal isoenzyme was normal, 16%.\par - severe obesity, on Ozempic since 7/2021, 0.5 mg/week - lost 15 lbs initially \par - NAFLD, recently mildly elev. ALT: possibly HOWARD; without fibrosis: 5/27/21 fibroscan: 5.6 kPa, 400 dB/m - no fibrosis, severe steatosis\par - US abdomen 2/16/21 ([facility]): fatty liver, no gallstones, limited exam b/o gas. No h/o signif. alcohol use.\par \par - naive to hepatitis A, B, and C.\par \par - leg edema: echo 7/01/21 essentially normal, no diastolic dysfunction or R heart disease. Cause may be venous insufficiency.\par - colonoscopy: 2014, sees Dr. Camilo Humphries\par \par \par Plan:\par - increase semaglutide s.c. to 1.0 mg/week. I will ask our pharmacist, Abdulaziz Howard, to try to get the higher dose\par - return in 6 months after repeat bloodwork\par - NAFLD: weight loss, diet, exercise; will plan to repeat evaluation for liver fibrosis after 2-3 years\par - elevated ALP: f/u with PCP for further evaluation of possible bone disease.

## 2022-08-29 NOTE — CONSULT LETTER
[Dear  ___] : Dear  [unfilled], [Consult Letter:] : I had the pleasure of evaluating your patient, [unfilled]. [Please see my note below.] : Please see my note below. [Consult Closing:] : Thank you very much for allowing me to participate in the care of this patient.  If you have any questions, please do not hesitate to contact me. [Sincerely,] : Sincerely, [FreeTextEntry2] : ERIC VICKERS (MINDY)\par  [FreeTextEntry3] : Gurwinder Altamirano MD\par , [facility]\par General Hepatology and Gastroenterology\par [facility]\par [facility]\par [address] [place] [address]\[place] [address]\par [phone]\par

## 2022-08-29 NOTE — HISTORY OF PRESENT ILLNESS
[Fatigue] : denies fatigue [Malaise] : denies malaise [Fever] : denies fever [Diffuse Joint Pain (Arthralgias)] : arthralgias stable [Muscle Aches, Generalized (Myalgias)] : denies myalgias [Yellow Skin Or Eyes (Jaundice)] : denies jaundice [Abdominal Pain] : denies abdominal pain [Urine Tests Nonspecific Abnormal Findings Biliuria] : denies dark urine [Light Colored Bowel Movement (Acholic Stools)] : denies pale stools [Insomnia] : denies insomnia [Skin: Rash] : denies rash [Itching (Pruritus)] : denies pruritus [Shortness Of Breath] : denies shortness of breath [Needlestick Exposure] : no needlestick exposure [Infected Sexual Partner] : no infected sexual partner [IV Drug Use] : no IV drug use [Tattoo] : no tattoos [Body Piercing] : no body piercing [Hemodialysis] : no hemodialysis [Transfusion before 1992] : no transfusion before 1992 [Transplant before 1992] : no transplant before 1992 [Incarceration] : no incarceration [Alcohol Abuse] : no alcohol abuse [Autoimmune Disorder] : no autoimmune disorder [Household Contact to HBV] : no household contact to HBV [Travel to Endemic Area] : no travel to an endemic area [Occupational Exposure] : no occupational exposure [Cocaine Use] : no cocaine use [Wt Gain ___ Lbs] : no recent weight gain [Wt Loss ___ Lbs] : no recent weight loss [de-identified] : - 8/29/22: Pt. consented to telehealth visit. The patient was at home, I was in the office at [address]. She has COVID and did not take Ozempic this week, but 0.5 mg/week before that. Weight stable 220 lbs. Last LFTs done in March. \par \par \par - 10/28/21: feels well, has not lost further weight. Takes semaglutide 0.5 mg/week, but the satiety it caused initially is gone. ALT now mildly elevated.\par \par - 8/31/21: returns after starting semaglutide 0.25 mg/week x 2 weeks, and 0.5 mg/week since s.c. Lost 15 lbs since last visit. Has less appetite, rarely snacks now, craving resolved. Still makes herself eat two or three meals per day out of concern she might get craving at night. Walks or bikes 3 times per week. Denies side effects.\par \par - 7/8/21: returns after bloodwork and fibroscan.\par \par - Ms. ARTHUR is a 61 year old woman with obesity s/p gastric bypass in 2004, ELI on CPAP, breast cancer s/p bilat. mastectomy 2019, asthma, referred because of elevated ALP ~200 since about 2015.\par She denies pruritus, jaundice. She has occas. joint pain in both thumbs and also trigger finger L ring finger.\par \par Weight hx: BMI 44, 242 lbs in 5/2021 - stable. Only lost 20 lbs after the bariatric surgery, from max. 268 lbs.\par Alcohol hx: occas. - 0-1 drinks per month. FHx: no liver disease \par \par Workup:\par - 7/01/21 echo: EF 68%, mild AR, endocardium not well visualized, grossly normal LV function, minimal TR. Normal pulm pressure.\par - 5/27/21 fibroscan: 5.6 kPa, 400 dB/m - no fibrosis, severe steatosis\par - US abdomen 2/16/21 ([facility]): fatty liver, no gallstones, limited exam b/o gas.

## 2022-09-09 ENCOUNTER — NON-APPOINTMENT (OUTPATIENT)
Age: 62
End: 2022-09-09

## 2022-11-01 NOTE — PROGRESS NOTE ADULT - ASSESSMENT
A/P 59F s/p bilateral mastectomy and breast reconstruction with MEEK flaps presenting with infection of left breast s/p bedside I&D      - Wound aspirate sent to micro  - Broad spectrum abx until culture speciates; Vancomycin and Zosyn  - Regular diet  - BID packing to left breast with kerlix soaked in Dakins  - IVF hydration  - Analgesia PRN with percocet  - VTE ppx with lovenox and SCDs  - trend wbc A/P 59F s/p bilateral mastectomy and breast reconstruction with MEEK flaps presenting with infection of left breast s/p bedside I&D      - Wound aspirate sent to micro  -- Vanc discontinued, Zosyn until sensitivities return for PO abx  - Regular diet  - BID packing to left breast with kerlix soaked in Dakins  - Analgesia PRN with percocet  - VTE ppx with lovenox and SCDs  - trend wbc H

## 2022-11-08 ENCOUNTER — APPOINTMENT (OUTPATIENT)
Dept: SURGICAL ONCOLOGY | Facility: CLINIC | Age: 62
End: 2022-11-08

## 2022-11-08 VITALS
OXYGEN SATURATION: 96 % | SYSTOLIC BLOOD PRESSURE: 137 MMHG | WEIGHT: 218 LBS | RESPIRATION RATE: 16 BRPM | HEART RATE: 75 BPM | TEMPERATURE: 97.8 F | BODY MASS INDEX: 41.16 KG/M2 | DIASTOLIC BLOOD PRESSURE: 84 MMHG | HEIGHT: 61 IN

## 2022-11-08 PROCEDURE — 99213 OFFICE O/P EST LOW 20 MIN: CPT

## 2022-11-08 NOTE — PHYSICAL EXAM
[Normal] : supple, no neck mass and thyroid not enlarged [Normal Supraclavicular Lymph Nodes] : normal supraclavicular lymph nodes [Normal Axillary Lymph Nodes] : normal axillary lymph nodes [Normal] : oriented to person, place and time, with appropriate affect [FreeTextEntry1] : SC present for exam  [de-identified] : Normal S1, S2. Regular rate and rhythm. [de-identified] : Complete breast exam performed in supine and upright positions. s/p b/l mastectomy with MEEK reconstruction.  Fat necrosis right reconstructed breast 12:00.  No palpable nodules or  signs of local recurrence. [de-identified] : Clear breath sounds bilaterally, normal respiratory effort.

## 2022-11-08 NOTE — HISTORY OF PRESENT ILLNESS
[de-identified] : 62 year-old female presents for a yearly follow up visit.  She is status post bilateral mastectomy with bilateral MEEK reconstruction by Dr. Anmol Mcclain on 3/27/19.  Final path: 0.8cm invasive lobular carcinoma in the left breast with 7 negative LNs.  ER/NJ(+) HER2(-). Tumor stage: pT1bN0; Right breast with LCIS and 5 negative LNs.  Recurrence score = 10. \par  She is currently taking Letrozole daily under the guidance of Dr. Mello Parr (med/onc). \par  Took Exemestane from 7/2019-1/2020 however stopped due to arthralgias.  Seeing hepatology for mildly elevated LFTs. \par \par \par  Denies any chest wall masses or pain. Denies constitutional symptoms\par

## 2022-11-08 NOTE — ASSESSMENT
[FreeTextEntry1] : Impression:\par No evidence of new or recurrent lesions - s/p b/l mastectomy for invasive lobular carcinoma Left breast 3/2019. No suspicious lesions on exam.\par On Letrozole daily \par \par \par Plan:\par RTO annually for surveillance\par Continued follow up with Dr. Mello Parr\par  \par All medical entries were at my, Dr. Davy Disla, direction. I have reviewed the chart and agree that the record accurately reflects my personal performance of the history, physical exam, assessment and plan. Our office Nurse Practitioner was present of the duration of the office visit.

## 2022-11-16 ENCOUNTER — NON-APPOINTMENT (OUTPATIENT)
Age: 62
End: 2022-11-16

## 2022-12-09 ENCOUNTER — RX RENEWAL (OUTPATIENT)
Age: 62
End: 2022-12-09

## 2022-12-09 ENCOUNTER — OUTPATIENT (OUTPATIENT)
Dept: OUTPATIENT SERVICES | Facility: HOSPITAL | Age: 62
LOS: 1 days | Discharge: HOME | End: 2022-12-09

## 2022-12-09 DIAGNOSIS — C50.919 MALIGNANT NEOPLASM OF UNSPECIFIED SITE OF UNSPECIFIED FEMALE BREAST: ICD-10-CM

## 2022-12-09 DIAGNOSIS — Z98.890 OTHER SPECIFIED POSTPROCEDURAL STATES: Chronic | ICD-10-CM

## 2022-12-09 DIAGNOSIS — Z90.13 ACQUIRED ABSENCE OF BILATERAL BREASTS AND NIPPLES: Chronic | ICD-10-CM

## 2022-12-09 DIAGNOSIS — M12.9 ARTHROPATHY, UNSPECIFIED: Chronic | ICD-10-CM

## 2022-12-09 DIAGNOSIS — Z41.9 ENCOUNTER FOR PROCEDURE FOR PURPOSES OTHER THAN REMEDYING HEALTH STATE, UNSPECIFIED: Chronic | ICD-10-CM

## 2022-12-09 DIAGNOSIS — Z90.49 ACQUIRED ABSENCE OF OTHER SPECIFIED PARTS OF DIGESTIVE TRACT: Chronic | ICD-10-CM

## 2022-12-16 ENCOUNTER — APPOINTMENT (OUTPATIENT)
Dept: HEMATOLOGY ONCOLOGY | Facility: CLINIC | Age: 62
End: 2022-12-16

## 2022-12-16 VITALS
TEMPERATURE: 97.4 F | DIASTOLIC BLOOD PRESSURE: 89 MMHG | BODY MASS INDEX: 41.54 KG/M2 | HEART RATE: 74 BPM | WEIGHT: 220 LBS | OXYGEN SATURATION: 97 % | SYSTOLIC BLOOD PRESSURE: 140 MMHG | HEIGHT: 60.98 IN | RESPIRATION RATE: 16 BRPM

## 2022-12-16 PROCEDURE — 99214 OFFICE O/P EST MOD 30 MIN: CPT

## 2022-12-16 NOTE — ASSESSMENT
[FreeTextEntry1] : She is a 61 y/o  F with Stage IA left breast cancer s/p bilateral mastectomy with sentinel lymph node biopsy. She had been on anastrozole and exemestane with intolerable side effects. She has been on letrozole and continues to tolerate therapy since 5/2020. Will await MRI results of the L hip. If no new findins, will consider bone scan. She will proceed with bone density this May to see if osteopenia is worsening. We reviewed calcium and Vitamin D supplementation along with exercise to maintain bone health. Next follow up in 6 months but earlier if any new symptoms.\par

## 2022-12-16 NOTE — HISTORY OF PRESENT ILLNESS
[Disease: _____________________] : Disease: [unfilled] [T: ___] : T[unfilled] [N: ___] : N[unfilled] [AJCC Stage: ____] : AJCC Stage: [unfilled] [de-identified] : Age 59: left breast cancer \par Screening mammogram on 12/16/2018 showed focal asymmetry in the posterior central lateral left breast and additional imaging showing 7 mm spiculated mass at the left 5:00 area. She had left breast biopsy which showed invasive mammary carcinoma with lobular features ER >90%, GA 24.5%, Lws5xml negative and LCIS. MRI of the breast showed biopsy proven nonmass enhancement along with left breast 4:00 enhancement and 9:00 enhancement. She had MRI guided biopsy of the L 4:00 area that showed mild proliferative fibrocystic changes and 9:00 area that showed LCIS and fibrocystic changes. On 3/27/19, she underwent R prophylactic mastectomy with SLN biopsy and L mastectomy and sentinel lymph node biopsy with Dr Disla. The pathology showed R breast LCIS and L left mastectomy showed invasive lobular carcinoma and extensive LCIS classic type with extensive ductal extension and negative sentinel lymph nodes. OncotypeDx score was 10. She had MEEK flap reconstruction with Dr Mcclain. She had issues with diarrhea and hair loss with anastrozole and was switched to exemestane. She developed worsening arthralgias from the exemestane: unable to open and abduct her thumbs only improved by cortisone injection. She started trial of letrozole on 5/2020.   [de-identified] : invasive lobular carcinoma ER >90%, NC 24.5%, [id number] negative  [de-identified] : exemestane 7/26/19 to 1/2020 stopped due to arthralgias\par Invitae 11/13/19 \par letrozole 5/2020 to present  [de-identified] : Since last visit, she had been started on losartan for BP: does not check BP at home. She feels tolerating letrozole better: hair is not falling anymore; joints not are not as stiff. She has noticed L hip pain: saw Dr Iggy Flowers at [facility] for orthopedics. She had Xrays and will be getting MRI of the hip. Not worse at night. Has been having pain with weight bearing and trouble tying her shoes. Denies any new breast pain or chest wall changes. No back pain, cough or HA.\par

## 2022-12-16 NOTE — PHYSICAL EXAM
[Fully active, able to carry on all pre-disease performance without restriction] : Status 0 - Fully active, able to carry on all pre-disease performance without restriction [Normal] : affect appropriate [de-identified] : no sinus tenderness [de-identified] : bilateral mastectomy with MEEK flap reconstruction; surgical tape with gauze over nipple tattoo

## 2022-12-16 NOTE — REVIEW OF SYSTEMS
[Joint Pain] : joint pain [Joint Stiffness] : no joint stiffness [Muscle Pain] : no muscle pain [Muscle Weakness] : no muscle weakness [Negative] : Allergic/Immunologic [FreeTextEntry9] : L hip pain

## 2022-12-28 ENCOUNTER — APPOINTMENT (OUTPATIENT)
Dept: NUCLEAR MEDICINE | Facility: IMAGING CENTER | Age: 62
End: 2022-12-28
Payer: COMMERCIAL

## 2022-12-28 ENCOUNTER — OUTPATIENT (OUTPATIENT)
Dept: OUTPATIENT SERVICES | Facility: HOSPITAL | Age: 62
LOS: 1 days | End: 2022-12-28
Payer: COMMERCIAL

## 2022-12-28 DIAGNOSIS — C50.512 MALIGNANT NEOPLASM OF LOWER-OUTER QUADRANT OF LEFT FEMALE BREAST: ICD-10-CM

## 2022-12-28 DIAGNOSIS — Z41.9 ENCOUNTER FOR PROCEDURE FOR PURPOSES OTHER THAN REMEDYING HEALTH STATE, UNSPECIFIED: Chronic | ICD-10-CM

## 2022-12-28 DIAGNOSIS — Z00.8 ENCOUNTER FOR OTHER GENERAL EXAMINATION: ICD-10-CM

## 2022-12-28 DIAGNOSIS — Z98.890 OTHER SPECIFIED POSTPROCEDURAL STATES: Chronic | ICD-10-CM

## 2022-12-28 DIAGNOSIS — Z90.13 ACQUIRED ABSENCE OF BILATERAL BREASTS AND NIPPLES: Chronic | ICD-10-CM

## 2022-12-28 DIAGNOSIS — M25.552 PAIN IN LEFT HIP: ICD-10-CM

## 2022-12-28 DIAGNOSIS — M12.9 ARTHROPATHY, UNSPECIFIED: Chronic | ICD-10-CM

## 2022-12-28 DIAGNOSIS — Z90.49 ACQUIRED ABSENCE OF OTHER SPECIFIED PARTS OF DIGESTIVE TRACT: Chronic | ICD-10-CM

## 2022-12-28 PROCEDURE — 78306 BONE IMAGING WHOLE BODY: CPT | Mod: 26

## 2022-12-28 PROCEDURE — A9561: CPT

## 2022-12-28 PROCEDURE — 78306 BONE IMAGING WHOLE BODY: CPT

## 2022-12-29 ENCOUNTER — NON-APPOINTMENT (OUTPATIENT)
Age: 62
End: 2022-12-29

## 2022-12-29 ENCOUNTER — APPOINTMENT (OUTPATIENT)
Dept: INTERNAL MEDICINE | Facility: CLINIC | Age: 62
End: 2022-12-29
Payer: COMMERCIAL

## 2022-12-29 PROCEDURE — 99213 OFFICE O/P EST LOW 20 MIN: CPT | Mod: 95

## 2022-12-29 PROCEDURE — 99203 OFFICE O/P NEW LOW 30 MIN: CPT | Mod: 95

## 2022-12-29 NOTE — HISTORY OF PRESENT ILLNESS
[Home] : at home, [unfilled] , at the time of the visit. [Medical Office: ([facility])___] : at the medical office located in  [Verbal consent obtained from patient] : the patient, [unfilled] [FreeTextEntry8] : C/o chest heaviness\par comes and goes \par last about an hour\par seems to happen everyday\par no associated symptoms\par no exacerbating factors\par no remitting factors\par

## 2023-01-03 ENCOUNTER — NON-APPOINTMENT (OUTPATIENT)
Age: 63
End: 2023-01-03

## 2023-01-19 ENCOUNTER — NON-APPOINTMENT (OUTPATIENT)
Age: 63
End: 2023-01-19

## 2023-01-26 ENCOUNTER — NON-APPOINTMENT (OUTPATIENT)
Age: 63
End: 2023-01-26

## 2023-01-28 NOTE — ASU PATIENT PROFILE, ADULT - FALL HARM RISK CONCLUSION
CT angio with moderate-marked bilateral lower extremity peripheral vascular disease, calcifications of AAA   Per vascular, no concern for acute limb ischemia, no surgical intervention   unremarkable lipid panel   repeat lactate 1.9 from 2.9    - c/w atorvastatin   - c/w ASA Universal Safety Interventions

## 2023-02-17 ENCOUNTER — NON-APPOINTMENT (OUTPATIENT)
Age: 63
End: 2023-02-17

## 2023-02-21 ENCOUNTER — TRANSCRIPTION ENCOUNTER (OUTPATIENT)
Age: 63
End: 2023-02-21

## 2023-03-10 NOTE — ASU PREOP CHECKLIST - 1.
Orientation: A&Ox4    O2 Modality: RA, CPAP @ night     Ambulation: Upx1 with walker and GB     Skin: C/D/I with exception to surgical site R ankle     Pain: pt denies, nerve block present     Gtts: NS @ 125     All needs met at this time. Bed in lowest position, bed alarm on, call light and overhead table within reach. Will continue to monitor. Covid vaccine Pfizer 4/12/21

## 2023-03-15 ENCOUNTER — APPOINTMENT (OUTPATIENT)
Dept: INTERNAL MEDICINE | Facility: CLINIC | Age: 63
End: 2023-03-15
Payer: COMMERCIAL

## 2023-03-15 VITALS
WEIGHT: 225 LBS | HEART RATE: 77 BPM | HEIGHT: 60 IN | OXYGEN SATURATION: 98 % | TEMPERATURE: 98 F | SYSTOLIC BLOOD PRESSURE: 156 MMHG | DIASTOLIC BLOOD PRESSURE: 77 MMHG | BODY MASS INDEX: 44.17 KG/M2

## 2023-03-15 VITALS — DIASTOLIC BLOOD PRESSURE: 84 MMHG | SYSTOLIC BLOOD PRESSURE: 132 MMHG

## 2023-03-15 DIAGNOSIS — M25.552 PAIN IN LEFT HIP: ICD-10-CM

## 2023-03-15 DIAGNOSIS — Z87.898 PERSONAL HISTORY OF OTHER SPECIFIED CONDITIONS: ICD-10-CM

## 2023-03-15 DIAGNOSIS — Z29.8 ENCOUNTER FOR OTHER SPECIFIED PROPHYLACTIC MEASURES: ICD-10-CM

## 2023-03-15 DIAGNOSIS — Z42.1 ENCOUNTER FOR BREAST RECONSTRUCTION FOLLOWING MASTECTOMY: ICD-10-CM

## 2023-03-15 DIAGNOSIS — Z87.09 PERSONAL HISTORY OF OTHER DISEASES OF THE RESPIRATORY SYSTEM: ICD-10-CM

## 2023-03-15 DIAGNOSIS — J45.909 UNSPECIFIED ASTHMA, UNCOMPLICATED: ICD-10-CM

## 2023-03-15 DIAGNOSIS — R07.89 OTHER CHEST PAIN: ICD-10-CM

## 2023-03-15 PROCEDURE — 99214 OFFICE O/P EST MOD 30 MIN: CPT

## 2023-03-15 RX ORDER — FLUTICASONE FUROATE 200 UG/1
200 POWDER RESPIRATORY (INHALATION)
Refills: 0 | Status: ACTIVE | COMMUNITY

## 2023-03-15 RX ORDER — SEMAGLUTIDE 1.34 MG/ML
4 INJECTION, SOLUTION SUBCUTANEOUS
Qty: 1 | Refills: 5 | Status: DISCONTINUED | COMMUNITY
Start: 2021-07-08 | End: 2023-03-15

## 2023-03-15 RX ORDER — ONDANSETRON 4 MG/1
4 TABLET, ORALLY DISINTEGRATING ORAL
Qty: 60 | Refills: 0 | Status: DISCONTINUED | COMMUNITY
Start: 2022-02-02 | End: 2023-03-15

## 2023-03-17 PROBLEM — Z29.8 ENCOUNTER FOR ALTITUDE SICKNESS PROPHYLAXIS: Status: RESOLVED | Noted: 2021-11-22 | Resolved: 2022-02-02

## 2023-03-17 PROBLEM — Z87.09 HISTORY OF HAY FEVER: Status: RESOLVED | Noted: 2019-01-23 | Resolved: 2023-03-17

## 2023-03-17 PROBLEM — J45.909 ASTHMA: Status: ACTIVE | Noted: 2021-05-24

## 2023-03-17 PROBLEM — Z87.898 HISTORY OF LUMP OF LEFT BREAST: Status: RESOLVED | Noted: 2018-12-16 | Resolved: 2021-07-20

## 2023-03-17 PROBLEM — M25.552 HIP PAIN, LEFT: Status: ACTIVE | Noted: 2022-12-16

## 2023-03-17 PROBLEM — R07.89 CHEST HEAVINESS: Status: RESOLVED | Noted: 2022-12-29 | Resolved: 2023-03-15

## 2023-03-17 PROBLEM — Z42.1 ENCOUNTER FOR BREAST RECONSTRUCTION FOLLOWING MASTECTOMY: Status: RESOLVED | Noted: 2019-02-25 | Resolved: 2021-07-20

## 2023-03-17 NOTE — PHYSICAL EXAM
[No Carotid Bruits] : no carotid bruits [No Edema] : there was no peripheral edema [No Joint Swelling] : no joint swelling [Normal] : no rash [de-identified] : +decreased ROM, tenderness left hip

## 2023-03-17 NOTE — HISTORY OF PRESENT ILLNESS
[No Pertinent Cardiac History] : no history of aortic stenosis, atrial fibrillation, coronary artery disease, recent myocardial infarction, or implantable device/pacemaker [Asthma] : asthma [COPD] : no COPD [Sleep Apnea] : sleep apnea [Smoker] : not a smoker [No Adverse Anesthesia Reaction] : no adverse anesthesia reaction in self or family member [Chronic Anticoagulation] : no chronic anticoagulation [Chronic Kidney Disease] : no chronic kidney disease [Diabetes] : no diabetes [(Patient denies any chest pain, claudication, dyspnea on exertion, orthopnea, palpitations or syncope)] : Patient denies any chest pain, claudication, dyspnea on exertion, orthopnea, palpitations or syncope [Moderate (4-6 METs)] : Moderate (4-6 METs) [FreeTextEntry1] : Left Total hip Replacement  [FreeTextEntry2] : 3/21/23 [FreeTextEntry3] : Dr. Romero  [FreeTextEntry4] : 64 y/o female patient with PMH: HTN, asthma, hx of breast malignancy s/p mastectomy, HOWARD, ELI, peripheral edema presents today:\par - Pre-operative clearance - patient with left hip pain/OA undergo total left hip replacement at [facility] performed by Dr. Romero of [facility] \par - reviewed labs and EKG

## 2023-03-21 NOTE — ASU DISCHARGE PLAN (ADULT/PEDIATRIC) - FREQUENT HAND WASHING PREVENTS THE SPREAD OF INFECTION.
Patient is calling regarding lab results. Patient stated his job is calling and he don't know what to tell them. He is still having symptoms. Patient was told today he would hear something back from office. Can you please view results and if work excuse needs to be extended?   Statement Selected

## 2023-04-14 ENCOUNTER — TRANSCRIPTION ENCOUNTER (OUTPATIENT)
Age: 63
End: 2023-04-14

## 2023-05-13 ENCOUNTER — RX RENEWAL (OUTPATIENT)
Age: 63
End: 2023-05-13

## 2023-06-05 ENCOUNTER — OUTPATIENT (OUTPATIENT)
Dept: OUTPATIENT SERVICES | Facility: HOSPITAL | Age: 63
LOS: 1 days | Discharge: HOME | End: 2023-06-05

## 2023-06-05 DIAGNOSIS — Z90.13 ACQUIRED ABSENCE OF BILATERAL BREASTS AND NIPPLES: Chronic | ICD-10-CM

## 2023-06-05 DIAGNOSIS — Z98.890 OTHER SPECIFIED POSTPROCEDURAL STATES: Chronic | ICD-10-CM

## 2023-06-05 DIAGNOSIS — Z41.9 ENCOUNTER FOR PROCEDURE FOR PURPOSES OTHER THAN REMEDYING HEALTH STATE, UNSPECIFIED: Chronic | ICD-10-CM

## 2023-06-05 DIAGNOSIS — Z90.49 ACQUIRED ABSENCE OF OTHER SPECIFIED PARTS OF DIGESTIVE TRACT: Chronic | ICD-10-CM

## 2023-06-05 DIAGNOSIS — C50.919 MALIGNANT NEOPLASM OF UNSPECIFIED SITE OF UNSPECIFIED FEMALE BREAST: ICD-10-CM

## 2023-06-05 DIAGNOSIS — M12.9 ARTHROPATHY, UNSPECIFIED: Chronic | ICD-10-CM

## 2023-06-11 ENCOUNTER — NON-APPOINTMENT (OUTPATIENT)
Age: 63
End: 2023-06-11

## 2023-06-16 ENCOUNTER — APPOINTMENT (OUTPATIENT)
Dept: HEMATOLOGY ONCOLOGY | Facility: CLINIC | Age: 63
End: 2023-06-16
Payer: COMMERCIAL

## 2023-06-16 VITALS
HEIGHT: 60 IN | DIASTOLIC BLOOD PRESSURE: 63 MMHG | BODY MASS INDEX: 42.2 KG/M2 | OXYGEN SATURATION: 97 % | WEIGHT: 214.95 LBS | SYSTOLIC BLOOD PRESSURE: 118 MMHG | HEART RATE: 80 BPM | RESPIRATION RATE: 17 BRPM

## 2023-06-16 PROCEDURE — 99213 OFFICE O/P EST LOW 20 MIN: CPT

## 2023-06-16 NOTE — PHYSICAL EXAM
[Restricted in physically strenuous activity but ambulatory and able to carry out work of a light or sedentary nature] : Status 1- Restricted in physically strenuous activity but ambulatory and able to carry out work of a light or sedentary nature, e.g., light house work, office work [Normal] : affect appropriate [de-identified] : no sinus tenderness [de-identified] : bilateral mastectomy with MEEK flap reconstruction; surgical tape with gauze over nipple tattoo

## 2023-06-16 NOTE — REVIEW OF SYSTEMS
[Diarrhea: Grade 0] : Diarrhea: Grade 0 [Joint Pain] : joint pain [Joint Stiffness] : no joint stiffness [Muscle Pain] : no muscle pain [Muscle Weakness] : no muscle weakness [Negative] : Allergic/Immunologic [FreeTextEntry9] : R hip pain

## 2023-06-16 NOTE — ASSESSMENT
[FreeTextEntry1] : She is a 61 y/o  F with Stage IA left breast cancer s/p bilateral mastectomy with sentinel lymph node biopsy. She had been on anastrozole and exemestane with intolerable side effects. She has been on letrozole and continues to tolerate therapy since 5/2020. Has been on endocrine therapy since 7/2019. We reviewed duration of therapy: 5 versus 7 years. Reviewed BCI to evaluate benefit of extending therapy. Currently feeling she is tolerating letrozole. We reviewed calcium and Vitamin D supplementation along with exercise to maintain bone health. Rx for bone density given to her. She will be getting blood work with PCP on physicial today. Questions answered to her satisfaction. She is agreeable with plan. Next follow up in 6 months but earlier if any new symptoms.\par

## 2023-06-16 NOTE — HISTORY OF PRESENT ILLNESS
[Disease: _____________________] : Disease: [unfilled] [T: ___] : T[unfilled] [N: ___] : N[unfilled] [AJCC Stage: ____] : AJCC Stage: [unfilled] [de-identified] : Age 59: left breast cancer \par Screening mammogram on 12/16/2018 showed focal asymmetry in the posterior central lateral left breast and additional imaging showing 7 mm spiculated mass at the left 5:00 area. She had left breast biopsy which showed invasive mammary carcinoma with lobular features ER >90%, IN 24.5%, Oiy6oeu negative and LCIS. MRI of the breast showed biopsy proven nonmass enhancement along with left breast 4:00 enhancement and 9:00 enhancement. She had MRI guided biopsy of the L 4:00 area that showed mild proliferative fibrocystic changes and 9:00 area that showed LCIS and fibrocystic changes. On 3/27/19, she underwent R prophylactic mastectomy with SLN biopsy and L mastectomy and sentinel lymph node biopsy with Dr Disla. The pathology showed R breast LCIS and L left mastectomy showed invasive lobular carcinoma and extensive LCIS classic type with extensive ductal extension and negative sentinel lymph nodes. OncotypeDx score was 10. She had MEEK flap reconstruction with Dr Mcclain. She had issues with diarrhea and hair loss with anastrozole and was switched to exemestane. She developed worsening arthralgias from the exemestane: unable to open and abduct her thumbs only improved by cortisone injection. She started trial of letrozole on 5/2020.   [de-identified] : invasive lobular carcinoma ER >90%, NE 24.5%, Ozm0xxu negative  [de-identified] : exemestane 7/26/19 to 1/2020 stopped due to arthralgias\par Invitae 11/13/19 \par letrozole 5/2020 to present  [de-identified] : Since last evaluation, she had hip L replacement and will need R replacement. She denies any new arthralgias over the hands and feels she is tolerating letrozole well. She will be seeing PCP later this month. Taking Vitamin C, E, B complex. No other new medications. She was able to work from home. Denies any new breast pain or chest wall changes. No back pain, cough or HA.\par

## 2023-06-19 ENCOUNTER — TRANSCRIPTION ENCOUNTER (OUTPATIENT)
Age: 63
End: 2023-06-19

## 2023-06-21 ENCOUNTER — TRANSCRIPTION ENCOUNTER (OUTPATIENT)
Age: 63
End: 2023-06-21

## 2023-07-18 ENCOUNTER — TRANSCRIPTION ENCOUNTER (OUTPATIENT)
Age: 63
End: 2023-07-18

## 2023-07-28 ENCOUNTER — TRANSCRIPTION ENCOUNTER (OUTPATIENT)
Age: 63
End: 2023-07-28

## 2023-08-07 NOTE — DISCHARGE NOTE PROVIDER - NSDCCPCAREPLAN_GEN_ALL_CORE_FT
Continue Regimen: Tacrolimus
Render In Strict Bullet Format?: No
Discontinue Regimen: hydrocortisone 2.5 % topical ointment  BID for 7-10 days
Detail Level: Zone
PRINCIPAL DISCHARGE DIAGNOSIS  Diagnosis: Left breast abscess  Assessment and Plan of Treatment:

## 2023-08-31 ENCOUNTER — NON-APPOINTMENT (OUTPATIENT)
Age: 63
End: 2023-08-31

## 2023-09-01 ENCOUNTER — APPOINTMENT (OUTPATIENT)
Dept: INTERNAL MEDICINE | Facility: CLINIC | Age: 63
End: 2023-09-01
Payer: COMMERCIAL

## 2023-09-01 VITALS
HEIGHT: 61 IN | SYSTOLIC BLOOD PRESSURE: 142 MMHG | WEIGHT: 203 LBS | OXYGEN SATURATION: 94 % | BODY MASS INDEX: 38.33 KG/M2 | TEMPERATURE: 98 F | HEART RATE: 72 BPM | DIASTOLIC BLOOD PRESSURE: 82 MMHG

## 2023-09-01 DIAGNOSIS — Z98.84 BARIATRIC SURGERY STATUS: ICD-10-CM

## 2023-09-01 DIAGNOSIS — Z01.818 ENCOUNTER FOR OTHER PREPROCEDURAL EXAMINATION: ICD-10-CM

## 2023-09-01 DIAGNOSIS — Z86.000 PERSONAL HISTORY OF IN-SITU NEOPLASM OF BREAST: ICD-10-CM

## 2023-09-01 DIAGNOSIS — F41.8 OTHER SPECIFIED ANXIETY DISORDERS: ICD-10-CM

## 2023-09-01 DIAGNOSIS — Z90.13 ACQUIRED ABSENCE OF BILATERAL BREASTS AND NIPPLES: ICD-10-CM

## 2023-09-01 DIAGNOSIS — E66.01 MORBID (SEVERE) OBESITY DUE TO EXCESS CALORIES: ICD-10-CM

## 2023-09-01 DIAGNOSIS — R60.9 EDEMA, UNSPECIFIED: ICD-10-CM

## 2023-09-01 DIAGNOSIS — G47.00 INSOMNIA, UNSPECIFIED: ICD-10-CM

## 2023-09-01 DIAGNOSIS — Z00.00 ENCOUNTER FOR GENERAL ADULT MEDICAL EXAMINATION W/OUT ABNORMAL FINDINGS: ICD-10-CM

## 2023-09-01 DIAGNOSIS — Z98.890 OTHER SPECIFIED POSTPROCEDURAL STATES: ICD-10-CM

## 2023-09-01 PROCEDURE — 36415 COLL VENOUS BLD VENIPUNCTURE: CPT

## 2023-09-01 PROCEDURE — 99396 PREV VISIT EST AGE 40-64: CPT | Mod: 25

## 2023-09-01 NOTE — HISTORY OF PRESENT ILLNESS
[de-identified] : 62 y/o female patient presents today for annual CPE/fasting labs - reviewed age appropriate preventive screening exams - reviewed and updated PMH/Medications/Allergies/Surgical hx

## 2023-09-11 ENCOUNTER — APPOINTMENT (OUTPATIENT)
Dept: INTERNAL MEDICINE | Facility: CLINIC | Age: 63
End: 2023-09-11
Payer: COMMERCIAL

## 2023-09-11 VITALS
SYSTOLIC BLOOD PRESSURE: 138 MMHG | BODY MASS INDEX: 38.33 KG/M2 | HEART RATE: 83 BPM | OXYGEN SATURATION: 97 % | DIASTOLIC BLOOD PRESSURE: 78 MMHG | WEIGHT: 203 LBS | HEIGHT: 61 IN | TEMPERATURE: 98.1 F

## 2023-09-11 DIAGNOSIS — I10 ESSENTIAL (PRIMARY) HYPERTENSION: ICD-10-CM

## 2023-09-11 DIAGNOSIS — Z99.89 OBSTRUCTIVE SLEEP APNEA (ADULT) (PEDIATRIC): ICD-10-CM

## 2023-09-11 DIAGNOSIS — Z01.818 ENCOUNTER FOR OTHER PREPROCEDURAL EXAMINATION: ICD-10-CM

## 2023-09-11 DIAGNOSIS — G47.33 OBSTRUCTIVE SLEEP APNEA (ADULT) (PEDIATRIC): ICD-10-CM

## 2023-09-11 DIAGNOSIS — Z87.891 PERSONAL HISTORY OF NICOTINE DEPENDENCE: ICD-10-CM

## 2023-09-11 DIAGNOSIS — M16.11 UNILATERAL PRIMARY OSTEOARTHRITIS, RIGHT HIP: ICD-10-CM

## 2023-09-11 PROCEDURE — 99214 OFFICE O/P EST MOD 30 MIN: CPT

## 2023-09-12 PROBLEM — G47.33 OSA ON CPAP: Status: ACTIVE | Noted: 2020-08-04

## 2023-09-12 PROBLEM — M16.11 PRIMARY OSTEOARTHRITIS OF RIGHT HIP: Status: ACTIVE | Noted: 2023-09-12

## 2023-09-12 PROBLEM — R60.9 PERIPHERAL EDEMA: Status: ACTIVE | Noted: 2021-07-20

## 2023-09-12 PROBLEM — Z01.818 PRE-OPERATIVE EXAMINATION: Status: ACTIVE | Noted: 2023-09-11

## 2023-09-12 PROBLEM — I10 BENIGN ESSENTIAL HYPERTENSION: Status: ACTIVE | Noted: 2022-02-02

## 2023-09-12 PROBLEM — E66.01 MORBID OBESITY: Status: ACTIVE | Noted: 2020-08-04

## 2023-09-12 PROBLEM — Z90.13 ABSENCE OF BOTH BREASTS: Status: ACTIVE | Noted: 2019-04-01

## 2023-09-12 LAB
25(OH)D3 SERPL-MCNC: 50.1 NG/ML
ALBUMIN SERPL ELPH-MCNC: 4.5 G/DL
ALP BLD-CCNC: 190 U/L
ALT SERPL-CCNC: 24 U/L
AMORPHOUS PRECEPITATE CRYSTALS: PRESENT
ANION GAP SERPL CALC-SCNC: 13 MMOL/L
APPEARANCE: ABNORMAL
AST SERPL-CCNC: 18 U/L
BACTERIA: ABNORMAL /HPF
BASOPHILS # BLD AUTO: 0.04 K/UL
BASOPHILS NFR BLD AUTO: 0.5 %
BILIRUB SERPL-MCNC: 0.5 MG/DL
BILIRUBIN URINE: NEGATIVE
BLOOD URINE: NEGATIVE
BUN SERPL-MCNC: 22 MG/DL
CALCIUM SERPL-MCNC: 9.5 MG/DL
CAST: 1 /LPF
CHLORIDE SERPL-SCNC: 110 MMOL/L
CHOLEST SERPL-MCNC: 199 MG/DL
CO2 SERPL-SCNC: 22 MMOL/L
COLOR: YELLOW
CREAT SERPL-MCNC: 1.07 MG/DL
CREAT SPEC-SCNC: 172 MG/DL
EGFR: 59 ML/MIN/1.73M2
EOSINOPHIL # BLD AUTO: 0.12 K/UL
EOSINOPHIL NFR BLD AUTO: 1.6 %
EPITHELIAL CELLS: 2 /HPF
ESTIMATED AVERAGE GLUCOSE: 100 MG/DL
GLUCOSE QUALITATIVE U: NEGATIVE MG/DL
GLUCOSE SERPL-MCNC: 98 MG/DL
HBA1C MFR BLD HPLC: 5.1 %
HCT VFR BLD CALC: 42.7 %
HDLC SERPL-MCNC: 69 MG/DL
HGB BLD-MCNC: 13.8 G/DL
IMM GRANULOCYTES NFR BLD AUTO: 0.1 %
KETONES URINE: NEGATIVE MG/DL
LDLC SERPL CALC-MCNC: 116 MG/DL
LEUKOCYTE ESTERASE URINE: ABNORMAL
LYMPHOCYTES # BLD AUTO: 2.25 K/UL
LYMPHOCYTES NFR BLD AUTO: 30.2 %
MAGNESIUM SERPL-MCNC: 1.9 MG/DL
MAN DIFF?: NORMAL
MCHC RBC-ENTMCNC: 29.3 PG
MCHC RBC-ENTMCNC: 32.3 GM/DL
MCV RBC AUTO: 90.7 FL
MICROALBUMIN 24H UR DL<=1MG/L-MCNC: <1.2 MG/DL
MICROALBUMIN/CREAT 24H UR-RTO: NORMAL MG/G
MICROSCOPIC-UA: NORMAL
MONOCYTES # BLD AUTO: 0.57 K/UL
MONOCYTES NFR BLD AUTO: 7.6 %
NEUTROPHILS # BLD AUTO: 4.47 K/UL
NEUTROPHILS NFR BLD AUTO: 60 %
NITRITE URINE: NEGATIVE
NONHDLC SERPL-MCNC: 130 MG/DL
PH URINE: 5.5
PLATELET # BLD AUTO: 296 K/UL
POTASSIUM SERPL-SCNC: 3.6 MMOL/L
PROT SERPL-MCNC: 6.9 G/DL
PROTEIN URINE: NEGATIVE MG/DL
RBC # BLD: 4.71 M/UL
RBC # FLD: 14.5 %
RED BLOOD CELLS URINE: 13 /HPF
REVIEW: NORMAL
SODIUM SERPL-SCNC: 145 MMOL/L
SPECIFIC GRAVITY URINE: 1.03
TRIGL SERPL-MCNC: 75 MG/DL
TSH SERPL-ACNC: 1.5 UIU/ML
UROBILINOGEN URINE: 1 MG/DL
WBC # FLD AUTO: 7.46 K/UL
WHITE BLOOD CELLS URINE: 3 /HPF
YEAST-LIKE CELLS: PRESENT

## 2023-09-29 ENCOUNTER — RX RENEWAL (OUTPATIENT)
Age: 63
End: 2023-09-29

## 2023-09-29 RX ORDER — LOSARTAN POTASSIUM AND HYDROCHLOROTHIAZIDE 12.5; 5 MG/1; MG/1
50-12.5 TABLET ORAL
Qty: 90 | Refills: 3 | Status: ACTIVE | COMMUNITY
Start: 2022-02-02 | End: 1900-01-01

## 2023-10-31 NOTE — HISTORY OF PRESENT ILLNESS
Pt's last appt was 11/14/22 and pt is scheduled for annual 2/26/24. Refills sent to cover pt until upcoming appt.     Pt notified.    [de-identified] : 61 y/o male patient presents today:\par - f/u HTN/peripheral edema/headaches- BP remains elevated today not controlled, discuss starting medications, denies any CP/SOB/Palpitations/dizziness \par - needs Shringrex vaccine #2 today, no prev imm reaction \par

## 2023-11-06 PROBLEM — Z08 ENCOUNTER FOR FOLLOW-UP SURVEILLANCE OF BREAST CANCER: Status: ACTIVE | Noted: 2022-11-07

## 2023-11-07 ENCOUNTER — APPOINTMENT (OUTPATIENT)
Dept: SURGICAL ONCOLOGY | Facility: CLINIC | Age: 63
End: 2023-11-07
Payer: COMMERCIAL

## 2023-11-07 VITALS
BODY MASS INDEX: 36.44 KG/M2 | HEART RATE: 90 BPM | OXYGEN SATURATION: 99 % | SYSTOLIC BLOOD PRESSURE: 119 MMHG | WEIGHT: 198 LBS | DIASTOLIC BLOOD PRESSURE: 83 MMHG | HEIGHT: 62 IN | RESPIRATION RATE: 17 BRPM

## 2023-11-07 DIAGNOSIS — Z08 ENCOUNTER FOR FOLLOW-UP EXAMINATION AFTER COMPLETED TREATMENT FOR MALIGNANT NEOPLASM: ICD-10-CM

## 2023-11-07 DIAGNOSIS — Z85.3 ENCOUNTER FOR FOLLOW-UP EXAMINATION AFTER COMPLETED TREATMENT FOR MALIGNANT NEOPLASM: ICD-10-CM

## 2023-11-07 PROCEDURE — 99213 OFFICE O/P EST LOW 20 MIN: CPT

## 2023-12-13 ENCOUNTER — OUTPATIENT (OUTPATIENT)
Dept: OUTPATIENT SERVICES | Facility: HOSPITAL | Age: 63
LOS: 1 days | Discharge: HOME | End: 2023-12-13

## 2023-12-13 DIAGNOSIS — Z98.890 OTHER SPECIFIED POSTPROCEDURAL STATES: Chronic | ICD-10-CM

## 2023-12-13 DIAGNOSIS — Z90.49 ACQUIRED ABSENCE OF OTHER SPECIFIED PARTS OF DIGESTIVE TRACT: Chronic | ICD-10-CM

## 2023-12-13 DIAGNOSIS — C50.919 MALIGNANT NEOPLASM OF UNSPECIFIED SITE OF UNSPECIFIED FEMALE BREAST: ICD-10-CM

## 2023-12-13 DIAGNOSIS — M12.9 ARTHROPATHY, UNSPECIFIED: Chronic | ICD-10-CM

## 2023-12-13 DIAGNOSIS — Z41.9 ENCOUNTER FOR PROCEDURE FOR PURPOSES OTHER THAN REMEDYING HEALTH STATE, UNSPECIFIED: Chronic | ICD-10-CM

## 2023-12-13 DIAGNOSIS — Z90.13 ACQUIRED ABSENCE OF BILATERAL BREASTS AND NIPPLES: Chronic | ICD-10-CM

## 2023-12-18 NOTE — H&P PST ADULT - MUSCULOSKELETAL
Diltiazem      Last Written Prescription Date:  11/9/23  Last Fill Quantity: 30,   # refills: 0  Last Office Visit: 11/15/23  Future Office visit:       Routing refill request to provider for review/approval because:         details… No joint pain, swelling or deformity; no limitation of movement

## 2023-12-26 ENCOUNTER — APPOINTMENT (OUTPATIENT)
Dept: OBGYN | Facility: CLINIC | Age: 63
End: 2023-12-26
Payer: COMMERCIAL

## 2023-12-26 VITALS
DIASTOLIC BLOOD PRESSURE: 81 MMHG | SYSTOLIC BLOOD PRESSURE: 122 MMHG | WEIGHT: 198 LBS | BODY MASS INDEX: 36.9 KG/M2 | HEIGHT: 61.5 IN

## 2023-12-26 DIAGNOSIS — Z01.419 ENCOUNTER FOR GYNECOLOGICAL EXAMINATION (GENERAL) (ROUTINE) W/OUT ABNORMAL FINDINGS: ICD-10-CM

## 2023-12-26 PROCEDURE — 99386 PREV VISIT NEW AGE 40-64: CPT

## 2023-12-26 NOTE — PHYSICAL EXAM
[Appropriately responsive] : appropriately responsive [Alert] : alert [No Acute Distress] : no acute distress [No Lymphadenopathy] : no lymphadenopathy [Regular Rate Rhythm] : regular rate rhythm [No Murmurs] : no murmurs [Clear to Auscultation B/L] : clear to auscultation bilaterally [Soft] : soft [Non-tender] : non-tender [Non-distended] : non-distended [No HSM] : No HSM [No Lesions] : no lesions [No Mass] : no mass [Oriented x3] : oriented x3 [Vulvar Atrophy] : vulvar atrophy [Labia Majora] : normal [Labia Minora] : normal [Atrophy] : atrophy [Normal] : normal [Uterine Adnexae] : normal

## 2023-12-26 NOTE — HISTORY OF PRESENT ILLNESS
[FreeTextEntry1] : The patient is here for a routine gynecological examination with Pap smear.  Her LMP was  over ten years ago  No   PMB She has no recent gynecological or urinary problems

## 2023-12-26 NOTE — DISCUSSION/SUMMARY
[FreeTextEntry1] : A healthy lifestyle can contribute to good health.  This involves maintaining good health habits and avoiding unhealthy activity (e.g. smoking, drugs, etc.)  Patient is counselled on a balanced diet, with Vitamin D supplement as needed.  Any activity is exercise and very important. Walking is encourage and should be brisk. as evidence shows that brisk walking is healthier for both body and brain.    Climbing stairs instead of elevators is good weight bearing exercise.  Dental care both at home and at the dentist office is important.  Rest at night of at least 6 hours is indicated.  The department of healthy lifestyle is available to help in creating good habits as well as dealing with problems  Patient is counselled to be up to date on colonoscopy.  For normal exams, every five or ten years is effective in detecting early colon cancer.  If there are positive findings such as polyps more frequent testing is indicated.  She should discuss this with her primary care physician

## 2023-12-29 ENCOUNTER — RESULT REVIEW (OUTPATIENT)
Age: 63
End: 2023-12-29

## 2023-12-29 ENCOUNTER — APPOINTMENT (OUTPATIENT)
Dept: HEMATOLOGY ONCOLOGY | Facility: CLINIC | Age: 63
End: 2023-12-29
Payer: COMMERCIAL

## 2023-12-29 VITALS
RESPIRATION RATE: 16 BRPM | WEIGHT: 197.31 LBS | BODY MASS INDEX: 36.68 KG/M2 | DIASTOLIC BLOOD PRESSURE: 74 MMHG | SYSTOLIC BLOOD PRESSURE: 123 MMHG | TEMPERATURE: 97.9 F | HEART RATE: 71 BPM | OXYGEN SATURATION: 98 %

## 2023-12-29 DIAGNOSIS — Z79.811 LONG TERM (CURRENT) USE OF AROMATASE INHIBITORS: ICD-10-CM

## 2023-12-29 LAB
ALBUMIN SERPL ELPH-MCNC: 4.4 G/DL
ALP BLD-CCNC: 226 U/L
ALT SERPL-CCNC: 27 U/L
ANION GAP SERPL CALC-SCNC: 12 MMOL/L
AST SERPL-CCNC: 22 U/L
BASOPHILS # BLD AUTO: 0.05 K/UL — SIGNIFICANT CHANGE UP (ref 0–0.2)
BASOPHILS # BLD AUTO: 0.05 K/UL — SIGNIFICANT CHANGE UP (ref 0–0.2)
BASOPHILS NFR BLD AUTO: 0.7 % — SIGNIFICANT CHANGE UP (ref 0–2)
BASOPHILS NFR BLD AUTO: 0.7 % — SIGNIFICANT CHANGE UP (ref 0–2)
BILIRUB SERPL-MCNC: 0.3 MG/DL
BUN SERPL-MCNC: 15 MG/DL
CALCIUM SERPL-MCNC: 9.5 MG/DL
CHLORIDE SERPL-SCNC: 105 MMOL/L
CO2 SERPL-SCNC: 25 MMOL/L
CREAT SERPL-MCNC: 1.05 MG/DL
EGFR: 60 ML/MIN/1.73M2
EOSINOPHIL # BLD AUTO: 0.14 K/UL — SIGNIFICANT CHANGE UP (ref 0–0.5)
EOSINOPHIL # BLD AUTO: 0.14 K/UL — SIGNIFICANT CHANGE UP (ref 0–0.5)
EOSINOPHIL NFR BLD AUTO: 2.1 % — SIGNIFICANT CHANGE UP (ref 0–6)
EOSINOPHIL NFR BLD AUTO: 2.1 % — SIGNIFICANT CHANGE UP (ref 0–6)
GLUCOSE SERPL-MCNC: 90 MG/DL
HCT VFR BLD CALC: 39.2 % — SIGNIFICANT CHANGE UP (ref 34.5–45)
HCT VFR BLD CALC: 39.2 % — SIGNIFICANT CHANGE UP (ref 34.5–45)
HGB BLD-MCNC: 13 G/DL — SIGNIFICANT CHANGE UP (ref 11.5–15.5)
HGB BLD-MCNC: 13 G/DL — SIGNIFICANT CHANGE UP (ref 11.5–15.5)
IMM GRANULOCYTES NFR BLD AUTO: 0.3 % — SIGNIFICANT CHANGE UP (ref 0–0.9)
IMM GRANULOCYTES NFR BLD AUTO: 0.3 % — SIGNIFICANT CHANGE UP (ref 0–0.9)
LYMPHOCYTES # BLD AUTO: 2.28 K/UL — SIGNIFICANT CHANGE UP (ref 1–3.3)
LYMPHOCYTES # BLD AUTO: 2.28 K/UL — SIGNIFICANT CHANGE UP (ref 1–3.3)
LYMPHOCYTES # BLD AUTO: 33.9 % — SIGNIFICANT CHANGE UP (ref 13–44)
LYMPHOCYTES # BLD AUTO: 33.9 % — SIGNIFICANT CHANGE UP (ref 13–44)
MCHC RBC-ENTMCNC: 28.3 PG — SIGNIFICANT CHANGE UP (ref 27–34)
MCHC RBC-ENTMCNC: 28.3 PG — SIGNIFICANT CHANGE UP (ref 27–34)
MCHC RBC-ENTMCNC: 33.2 G/DL — SIGNIFICANT CHANGE UP (ref 32–36)
MCHC RBC-ENTMCNC: 33.2 G/DL — SIGNIFICANT CHANGE UP (ref 32–36)
MCV RBC AUTO: 85.4 FL — SIGNIFICANT CHANGE UP (ref 80–100)
MCV RBC AUTO: 85.4 FL — SIGNIFICANT CHANGE UP (ref 80–100)
MONOCYTES # BLD AUTO: 0.57 K/UL — SIGNIFICANT CHANGE UP (ref 0–0.9)
MONOCYTES # BLD AUTO: 0.57 K/UL — SIGNIFICANT CHANGE UP (ref 0–0.9)
MONOCYTES NFR BLD AUTO: 8.5 % — SIGNIFICANT CHANGE UP (ref 2–14)
MONOCYTES NFR BLD AUTO: 8.5 % — SIGNIFICANT CHANGE UP (ref 2–14)
NEUTROPHILS # BLD AUTO: 3.66 K/UL — SIGNIFICANT CHANGE UP (ref 1.8–7.4)
NEUTROPHILS # BLD AUTO: 3.66 K/UL — SIGNIFICANT CHANGE UP (ref 1.8–7.4)
NEUTROPHILS NFR BLD AUTO: 54.5 % — SIGNIFICANT CHANGE UP (ref 43–77)
NEUTROPHILS NFR BLD AUTO: 54.5 % — SIGNIFICANT CHANGE UP (ref 43–77)
NRBC # BLD: 0 /100 WBCS — SIGNIFICANT CHANGE UP (ref 0–0)
NRBC # BLD: 0 /100 WBCS — SIGNIFICANT CHANGE UP (ref 0–0)
PLATELET # BLD AUTO: 277 K/UL — SIGNIFICANT CHANGE UP (ref 150–400)
PLATELET # BLD AUTO: 277 K/UL — SIGNIFICANT CHANGE UP (ref 150–400)
POTASSIUM SERPL-SCNC: 4 MMOL/L
PROT SERPL-MCNC: 6.7 G/DL
RBC # BLD: 4.59 M/UL — SIGNIFICANT CHANGE UP (ref 3.8–5.2)
RBC # BLD: 4.59 M/UL — SIGNIFICANT CHANGE UP (ref 3.8–5.2)
RBC # FLD: 14.1 % — SIGNIFICANT CHANGE UP (ref 10.3–14.5)
RBC # FLD: 14.1 % — SIGNIFICANT CHANGE UP (ref 10.3–14.5)
SODIUM SERPL-SCNC: 142 MMOL/L
WBC # BLD: 6.72 K/UL — SIGNIFICANT CHANGE UP (ref 3.8–10.5)
WBC # BLD: 6.72 K/UL — SIGNIFICANT CHANGE UP (ref 3.8–10.5)
WBC # FLD AUTO: 6.72 K/UL — SIGNIFICANT CHANGE UP (ref 3.8–10.5)
WBC # FLD AUTO: 6.72 K/UL — SIGNIFICANT CHANGE UP (ref 3.8–10.5)

## 2023-12-29 PROCEDURE — 99214 OFFICE O/P EST MOD 30 MIN: CPT

## 2023-12-29 NOTE — REVIEW OF SYSTEMS
[Diarrhea: Grade 0] : Diarrhea: Grade 0 [Joint Pain] : no joint pain [Joint Stiffness] : joint stiffness [Muscle Pain] : no muscle pain [Muscle Weakness] : no muscle weakness [Negative] : Allergic/Immunologic [FreeTextEntry9] : stiffness over the hips

## 2023-12-29 NOTE — HISTORY OF PRESENT ILLNESS
[Disease: _____________________] : Disease: [unfilled] [T: ___] : T[unfilled] [N: ___] : N[unfilled] [AJCC Stage: ____] : AJCC Stage: [unfilled] [de-identified] : Age 59: left breast cancer \par  Screening mammogram on 12/16/2018 showed focal asymmetry in the posterior central lateral left breast and additional imaging showing 7 mm spiculated mass at the left 5:00 area. She had left breast biopsy which showed invasive mammary carcinoma with lobular features ER >90%, GA 24.5%, Rvx0avd negative and LCIS. MRI of the breast showed biopsy proven nonmass enhancement along with left breast 4:00 enhancement and 9:00 enhancement. She had MRI guided biopsy of the L 4:00 area that showed mild proliferative fibrocystic changes and 9:00 area that showed LCIS and fibrocystic changes. On 3/27/19, she underwent R prophylactic mastectomy with SLN biopsy and L mastectomy and sentinel lymph node biopsy with Dr Disla. The pathology showed R breast LCIS and L left mastectomy showed invasive lobular carcinoma and extensive LCIS classic type with extensive ductal extension and negative sentinel lymph nodes. OncotypeDx score was 10. She had MEEK flap reconstruction with Dr Mcclain. She had issues with diarrhea and hair loss with anastrozole and was switched to exemestane. She developed worsening arthralgias from the exemestane: unable to open and abduct her thumbs only improved by cortisone injection. She started trial of letrozole on 5/2020.   [de-identified] : invasive lobular carcinoma ER >90%, NM 24.5%, Bei6boj negative  [de-identified] : exemestane 7/26/19 to 1/2020 stopped due to arthralgias\par  Invitae 11/13/19 \par  letrozole 5/2020 to present  [de-identified] : She saw GYN and Dr Disla last few months. Had blood work done with PCP in September. Denies any new breast pain or chest wall changes. No back pain, cough or HA. Had B hip replacement and walking much better but has stiffness. Has not noted any additional problems with hand joints or hair on letrozole. She is willing to take to 5 years.

## 2023-12-29 NOTE — PHYSICAL EXAM
[Fully active, able to carry on all pre-disease performance without restriction] : Status 0 - Fully active, able to carry on all pre-disease performance without restriction [Normal] : affect appropriate [de-identified] : no sinus tenderness [de-identified] : bilateral mastectomy with MEEK flap reconstruction; surgical tape with gauze over nipple tattoo

## 2023-12-29 NOTE — ASSESSMENT
[FreeTextEntry1] : She is a 64 y/o  F with Stage IA left breast cancer s/p bilateral mastectomy with sentinel lymph node biopsy. She had been on anastrozole and exemestane with intolerable side effects. She has been on letrozole and continues to tolerate therapy since 5/2020. Has been on endocrine therapy since 7/2019. We reviewed duration of therapy: 5 versus 7 years: she will finish 5 years 5/2024 and will consider extending therapy. Will obtain interval LFTs on therapy today. We reviewed low fat diet and exercise daily to help improve breast cancer survival as per WINS study. Questions answered to her satisfaction. She is agreeable with plan. Next follow up in 6 months but earlier if any new symptoms.  Osteopenia: We reviewed calcium and Vitamin D supplementation along with exercise to maintain bone health. Will have interval follow up bone density next year.

## 2023-12-30 LAB — CYTOLOGY CVX/VAG DOC THIN PREP: ABNORMAL

## 2024-01-19 NOTE — H&P ADULT - NSICDXPASTSURGICALHX_GEN_ALL_CORE_FT
Left voice mail reminder of financial telephone appt. Time.    
PAST SURGICAL HISTORY:  Arthropathy right knee meniscus surgery    H/O bilateral mastectomy with MEEK flap 3/2019    H/O gastric bypass 2004    S/P appendectomy 1971    S/P bunionectomy Right x 1 - 1999. left x 2 with hardware- 2001, 2014

## 2024-01-24 NOTE — H&P PST ADULT - PRESSURE ULCER(S)
no
This patient has been assessed with a concern for Malnutrition and was treated during this hospitalization for the following Nutrition diagnosis/diagnoses:     -  01/26/2024: Severe protein-calorie malnutrition

## 2024-02-15 NOTE — ASU PATIENT PROFILE, ADULT - NS PREOP UNDERSTANDS INFO2
yes 35 yo F presents c/o abd pain. Pt states pain started after trying to have a BM, generalized in nature mostly upper abdomen but also in flank region. denies n/v/d, constipation, dysuria, hematuria. Pt notes she has not eaten anything all day but had a lot of coffee on an empty stomach, was about to eat dinner shortly when sx began. Afebrile. abd soft non-tender but uncomfortable appearing. labs/ua/ct w/o acute findings. sx improved with pepcid and maalox. suspect ?gastritis based on hx and sx improvement. will dc with rx meds outpt f/u and return precautions

## 2024-02-16 NOTE — BRIEF OPERATIVE NOTE - TYPE OF ANESTHESIA
When you do your pregnancy test before the stress test, please also have them draw your blood to look at your cholesterol  
General

## 2024-04-08 ENCOUNTER — TRANSCRIPTION ENCOUNTER (OUTPATIENT)
Age: 64
End: 2024-04-08

## 2024-04-08 RX ORDER — SEMAGLUTIDE 1.34 MG/ML
4 INJECTION, SOLUTION SUBCUTANEOUS
Qty: 1 | Refills: 2 | Status: DISCONTINUED | COMMUNITY
End: 2024-04-08

## 2024-04-09 RX ORDER — SEMAGLUTIDE 1 MG/.5ML
1 INJECTION, SOLUTION SUBCUTANEOUS
Qty: 3 | Refills: 0 | Status: ACTIVE | COMMUNITY
Start: 2024-04-08

## 2024-05-20 NOTE — H&P PST ADULT - CENTRAL VENOUS CATHETER
Karina, Dennis Rodney, Lilliana Matthews, Alejandra no Karina, Dennis Rodney, Lilliana Matthews, Alejandra Frankel, Juan MITCHELL

## 2024-06-27 ENCOUNTER — NON-APPOINTMENT (OUTPATIENT)
Age: 64
End: 2024-06-27

## 2024-06-28 ENCOUNTER — APPOINTMENT (OUTPATIENT)
Dept: HEMATOLOGY ONCOLOGY | Facility: CLINIC | Age: 64
End: 2024-06-28
Payer: COMMERCIAL

## 2024-06-28 VITALS
RESPIRATION RATE: 16 BRPM | DIASTOLIC BLOOD PRESSURE: 80 MMHG | TEMPERATURE: 97.7 F | OXYGEN SATURATION: 98 % | WEIGHT: 192.88 LBS | BODY MASS INDEX: 35.86 KG/M2 | HEART RATE: 79 BPM | SYSTOLIC BLOOD PRESSURE: 124 MMHG

## 2024-06-28 DIAGNOSIS — M85.88 OTHER SPECIFIED DISORDERS OF BONE DENSITY AND STRUCTURE, OTHER SITE: ICD-10-CM

## 2024-06-28 DIAGNOSIS — C50.512 MALIGNANT NEOPLASM OF LOWER-OUTER QUADRANT OF LEFT FEMALE BREAST: ICD-10-CM

## 2024-06-28 PROCEDURE — 99214 OFFICE O/P EST MOD 30 MIN: CPT

## 2024-06-28 PROCEDURE — G2211 COMPLEX E/M VISIT ADD ON: CPT

## 2024-07-15 NOTE — ASU PATIENT PROFILE, ADULT - PRO INTERPRETER NEED 2
Received Homehealth Orders for [facility] on patient. Pt has been scheduled for HH on 5/20/2024 and visit was cancelled, another visit made on 6/4/2024 and visit was also cancelled. Per Dr. Flores will not sign off on HH Orders until pt is seen in the office. Ty        Called pt to get him scheduled for another visit for HH Orders to be signed. Pt declined the visit and stated he didn't want to see Dr. Flores. Informed pt that in order for the orders to be signed and paid he would need to be seen. Pt still declined the visit. Verbal order was given on 5/15/2024 @ 4:13 pm. Called Neema @ [facility] @ [phone] Fax # [phone] and informed her that w/o the visit we wouldn't be able to sign off on orders. Neema understands. Ty  
English

## 2024-09-03 ENCOUNTER — TRANSCRIPTION ENCOUNTER (OUTPATIENT)
Age: 64
End: 2024-09-03

## 2024-09-04 ENCOUNTER — APPOINTMENT (OUTPATIENT)
Dept: INTERNAL MEDICINE | Facility: CLINIC | Age: 64
End: 2024-09-04
Payer: COMMERCIAL

## 2024-09-04 VITALS
HEART RATE: 82 BPM | OXYGEN SATURATION: 98 % | DIASTOLIC BLOOD PRESSURE: 80 MMHG | BODY MASS INDEX: 35.79 KG/M2 | SYSTOLIC BLOOD PRESSURE: 122 MMHG | TEMPERATURE: 98 F | WEIGHT: 192 LBS | HEIGHT: 61.5 IN

## 2024-09-04 DIAGNOSIS — Z87.891 PERSONAL HISTORY OF NICOTINE DEPENDENCE: ICD-10-CM

## 2024-09-04 DIAGNOSIS — C50.512 MALIGNANT NEOPLASM OF LOWER-OUTER QUADRANT OF LEFT FEMALE BREAST: ICD-10-CM

## 2024-09-04 DIAGNOSIS — Z01.419 ENCOUNTER FOR GYNECOLOGICAL EXAMINATION (GENERAL) (ROUTINE) W/OUT ABNORMAL FINDINGS: ICD-10-CM

## 2024-09-04 DIAGNOSIS — F41.8 OTHER SPECIFIED ANXIETY DISORDERS: ICD-10-CM

## 2024-09-04 DIAGNOSIS — E66.9 OBESITY, UNSPECIFIED: ICD-10-CM

## 2024-09-04 DIAGNOSIS — Z85.3 ENCOUNTER FOR FOLLOW-UP EXAMINATION AFTER COMPLETED TREATMENT FOR MALIGNANT NEOPLASM: ICD-10-CM

## 2024-09-04 DIAGNOSIS — Z00.00 ENCOUNTER FOR GENERAL ADULT MEDICAL EXAMINATION W/OUT ABNORMAL FINDINGS: ICD-10-CM

## 2024-09-04 DIAGNOSIS — I10 ESSENTIAL (PRIMARY) HYPERTENSION: ICD-10-CM

## 2024-09-04 DIAGNOSIS — M85.88 OTHER SPECIFIED DISORDERS OF BONE DENSITY AND STRUCTURE, OTHER SITE: ICD-10-CM

## 2024-09-04 DIAGNOSIS — J45.909 UNSPECIFIED ASTHMA, UNCOMPLICATED: ICD-10-CM

## 2024-09-04 DIAGNOSIS — M25.552 PAIN IN LEFT HIP: ICD-10-CM

## 2024-09-04 DIAGNOSIS — Z90.13 ACQUIRED ABSENCE OF BILATERAL BREASTS AND NIPPLES: ICD-10-CM

## 2024-09-04 DIAGNOSIS — Z01.818 ENCOUNTER FOR OTHER PREPROCEDURAL EXAMINATION: ICD-10-CM

## 2024-09-04 DIAGNOSIS — Z08 ENCOUNTER FOR FOLLOW-UP EXAMINATION AFTER COMPLETED TREATMENT FOR MALIGNANT NEOPLASM: ICD-10-CM

## 2024-09-04 DIAGNOSIS — G47.00 INSOMNIA, UNSPECIFIED: ICD-10-CM

## 2024-09-04 DIAGNOSIS — Z23 ENCOUNTER FOR IMMUNIZATION: ICD-10-CM

## 2024-09-04 DIAGNOSIS — E66.01 MORBID (SEVERE) OBESITY DUE TO EXCESS CALORIES: ICD-10-CM

## 2024-09-04 DIAGNOSIS — Z98.84 BARIATRIC SURGERY STATUS: ICD-10-CM

## 2024-09-04 DIAGNOSIS — R51.9 HEADACHE, UNSPECIFIED: ICD-10-CM

## 2024-09-04 PROCEDURE — G0447 BEHAVIOR COUNSEL OBESITY 15M: CPT | Mod: NC,59

## 2024-09-04 PROCEDURE — 36415 COLL VENOUS BLD VENIPUNCTURE: CPT

## 2024-09-04 PROCEDURE — 99396 PREV VISIT EST AGE 40-64: CPT | Mod: 25

## 2024-09-04 PROCEDURE — G0444 DEPRESSION SCREEN ANNUAL: CPT | Mod: 59

## 2024-09-04 NOTE — HISTORY OF PRESENT ILLNESS
[de-identified] : 63 y/o female patient presents today for annual CPE/fasting labs - reviewed age appropriate preventive screening exams - reviewed and updated PMH/Medications/Allergies/Surgical hx  - hx of left breast malignancy s/p B/L mastectomy, breast reconstruction  - following with oncology, stable  - f/i HTN/peripheral edema - BP well controlled today, denies any CP/SOB/Palpitations, compliant with medication  - f/u depression/anxiety/insomnia - following with psychiatry, stable on current medications  - f/u weight management/obesity - currently on Wegovy 1mg weekly, denies overt side effects, doing well, Total weight loss: 33 lbs

## 2024-09-04 NOTE — HEALTH RISK ASSESSMENT
[Yes] : Yes [No] : In the past 12 months have you used drugs other than those required for medical reasons? No [No falls in past year] : Patient reported no falls in the past year [0] : 2) Feeling down, depressed, or hopeless: Not at all (0) [PHQ-2 Negative - No further assessment needed] : PHQ-2 Negative - No further assessment needed [I have developed a follow-up plan documented below in the note.] : I have developed a follow-up plan documented below in the note. [Time Spent: ___ Minutes] : I spent [unfilled] minutes performing a depression screening for this patient. [PMM5Lyhoi] : 0 [Former] : Former [5-9] : 5-9 [> 15 Years] : > 15 Years [de-identified] : 1 pack per week x 15 years, QUIT age 36 [Patient reported PAP Smear was normal] : Patient reported PAP Smear was normal [Patient reported bone density results were abnormal] : Patient reported bone density results were abnormal [Patient reported colonoscopy was normal] : Patient reported colonoscopy was normal [Change in mental status noted] : No change in mental status noted [None] : None [Employed] : employed [Sexually Active] : sexually active [High Risk Behavior] : no high risk behavior [Feels Safe at Home] : Feels safe at home [Fully functional (bathing, dressing, toileting, transferring, walking, feeding)] : Fully functional (bathing, dressing, toileting, transferring, walking, feeding) [Fully functional (using the telephone, shopping, preparing meals, housekeeping, doing laundry, using] : Fully functional and needs no help or supervision to perform IADLs (using the telephone, shopping, preparing meals, housekeeping, doing laundry, using transportation, managing medications and managing finances) [Reports changes in hearing] : Reports no changes in hearing [Reports changes in vision] : Reports no changes in vision [Reports changes in dental health] : Reports no changes in dental health [PapSmearDate] : 12/2023 [BoneDensityDate] : 6/2023 [ColonoscopyDate] : 2022

## 2024-09-09 ENCOUNTER — TRANSCRIPTION ENCOUNTER (OUTPATIENT)
Age: 64
End: 2024-09-09

## 2024-09-09 LAB
25(OH)D3 SERPL-MCNC: 39.1 NG/ML
ALBUMIN SERPL ELPH-MCNC: 4.2 G/DL
ALP BLD-CCNC: 217 U/L
ALT SERPL-CCNC: 27 U/L
ANION GAP SERPL CALC-SCNC: 13 MMOL/L
APPEARANCE: CLEAR
AST SERPL-CCNC: 25 U/L
BACTERIA: ABNORMAL /HPF
BASOPHILS # BLD AUTO: 0.05 K/UL
BASOPHILS NFR BLD AUTO: 0.6 %
BILIRUB SERPL-MCNC: 0.3 MG/DL
BILIRUBIN URINE: NEGATIVE
BLOOD URINE: NEGATIVE
BUN SERPL-MCNC: 25 MG/DL
CALCIUM SERPL-MCNC: 9.1 MG/DL
CAST: 0 /LPF
CHLORIDE SERPL-SCNC: 106 MMOL/L
CHOLEST SERPL-MCNC: 188 MG/DL
CO2 SERPL-SCNC: 24 MMOL/L
COLOR: YELLOW
CREAT SERPL-MCNC: 1.03 MG/DL
CREAT SPEC-SCNC: 114 MG/DL
EGFR: 61 ML/MIN/1.73M2
EOSINOPHIL # BLD AUTO: 0.15 K/UL
EOSINOPHIL NFR BLD AUTO: 1.8 %
EPITHELIAL CELLS: 2 /HPF
ESTIMATED AVERAGE GLUCOSE: 103 MG/DL
GLUCOSE QUALITATIVE U: NEGATIVE MG/DL
GLUCOSE SERPL-MCNC: 77 MG/DL
HBA1C MFR BLD HPLC: 5.2 %
HCT VFR BLD CALC: 40.4 %
HDLC SERPL-MCNC: 62 MG/DL
HGB BLD-MCNC: 13.3 G/DL
IMM GRANULOCYTES NFR BLD AUTO: 0.2 %
IRON SATN MFR SERPL: 15 %
IRON SERPL-MCNC: 72 UG/DL
KETONES URINE: NEGATIVE MG/DL
LDLC SERPL CALC-MCNC: 105 MG/DL
LEUKOCYTE ESTERASE URINE: ABNORMAL
LYMPHOCYTES # BLD AUTO: 2.61 K/UL
LYMPHOCYTES NFR BLD AUTO: 31.6 %
MAN DIFF?: NORMAL
MCHC RBC-ENTMCNC: 29.8 PG
MCHC RBC-ENTMCNC: 32.9 GM/DL
MCV RBC AUTO: 90.4 FL
MICROALBUMIN 24H UR DL<=1MG/L-MCNC: 1.3 MG/DL
MICROALBUMIN/CREAT 24H UR-RTO: 11 MG/G
MICROSCOPIC-UA: NORMAL
MONOCYTES # BLD AUTO: 0.73 K/UL
MONOCYTES NFR BLD AUTO: 8.8 %
NEUTROPHILS # BLD AUTO: 4.7 K/UL
NEUTROPHILS NFR BLD AUTO: 57 %
NITRITE URINE: NEGATIVE
NONHDLC SERPL-MCNC: 126 MG/DL
PH URINE: 5.5
PLATELET # BLD AUTO: 311 K/UL
POTASSIUM SERPL-SCNC: 4.2 MMOL/L
PROT SERPL-MCNC: 6.8 G/DL
PROTEIN URINE: NEGATIVE MG/DL
RBC # BLD: 4.47 M/UL
RBC # FLD: 14.1 %
RED BLOOD CELLS URINE: 2 /HPF
REVIEW: NORMAL
SODIUM SERPL-SCNC: 143 MMOL/L
SPECIFIC GRAVITY URINE: 1.02
TIBC SERPL-MCNC: 476 UG/DL
TRIGL SERPL-MCNC: 114 MG/DL
TSH SERPL-ACNC: 1.57 UIU/ML
UIBC SERPL-MCNC: 403 UG/DL
UROBILINOGEN URINE: 0.2 MG/DL
VIT B12 SERPL-MCNC: 468 PG/ML
WBC # FLD AUTO: 8.26 K/UL
WHITE BLOOD CELLS URINE: 0 /HPF

## 2024-10-29 ENCOUNTER — RX RENEWAL (OUTPATIENT)
Age: 64
End: 2024-10-29

## 2024-11-06 ENCOUNTER — NON-APPOINTMENT (OUTPATIENT)
Age: 64
End: 2024-11-06

## 2024-11-12 ENCOUNTER — APPOINTMENT (OUTPATIENT)
Dept: SURGICAL ONCOLOGY | Facility: CLINIC | Age: 64
End: 2024-11-12
Payer: COMMERCIAL

## 2024-11-12 VITALS
SYSTOLIC BLOOD PRESSURE: 129 MMHG | OXYGEN SATURATION: 98 % | HEART RATE: 72 BPM | DIASTOLIC BLOOD PRESSURE: 83 MMHG | BODY MASS INDEX: 33.55 KG/M2 | HEIGHT: 61.5 IN | RESPIRATION RATE: 17 BRPM | WEIGHT: 180 LBS

## 2024-11-12 DIAGNOSIS — C50.512 MALIGNANT NEOPLASM OF LOWER-OUTER QUADRANT OF LEFT FEMALE BREAST: ICD-10-CM

## 2024-11-12 PROCEDURE — 99213 OFFICE O/P EST LOW 20 MIN: CPT

## 2025-01-13 ENCOUNTER — OUTPATIENT (OUTPATIENT)
Dept: OUTPATIENT SERVICES | Facility: HOSPITAL | Age: 65
LOS: 1 days | Discharge: HOME | End: 2025-01-13

## 2025-01-13 DIAGNOSIS — C50.919 MALIGNANT NEOPLASM OF UNSPECIFIED SITE OF UNSPECIFIED FEMALE BREAST: ICD-10-CM

## 2025-01-13 DIAGNOSIS — Z98.890 OTHER SPECIFIED POSTPROCEDURAL STATES: Chronic | ICD-10-CM

## 2025-01-13 DIAGNOSIS — Z41.9 ENCOUNTER FOR PROCEDURE FOR PURPOSES OTHER THAN REMEDYING HEALTH STATE, UNSPECIFIED: Chronic | ICD-10-CM

## 2025-01-13 DIAGNOSIS — Z90.13 ACQUIRED ABSENCE OF BILATERAL BREASTS AND NIPPLES: Chronic | ICD-10-CM

## 2025-01-13 DIAGNOSIS — M12.9 ARTHROPATHY, UNSPECIFIED: Chronic | ICD-10-CM

## 2025-01-13 DIAGNOSIS — Z90.49 ACQUIRED ABSENCE OF OTHER SPECIFIED PARTS OF DIGESTIVE TRACT: Chronic | ICD-10-CM

## 2025-01-15 ENCOUNTER — APPOINTMENT (OUTPATIENT)
Dept: HEMATOLOGY ONCOLOGY | Facility: CLINIC | Age: 65
End: 2025-01-15
Payer: COMMERCIAL

## 2025-01-15 VITALS
WEIGHT: 185.19 LBS | BODY MASS INDEX: 34.42 KG/M2 | DIASTOLIC BLOOD PRESSURE: 67 MMHG | OXYGEN SATURATION: 97 % | HEART RATE: 70 BPM | TEMPERATURE: 97.3 F | RESPIRATION RATE: 17 BRPM | SYSTOLIC BLOOD PRESSURE: 107 MMHG

## 2025-01-15 DIAGNOSIS — Z79.811 LONG TERM (CURRENT) USE OF AROMATASE INHIBITORS: ICD-10-CM

## 2025-01-15 DIAGNOSIS — C50.512 MALIGNANT NEOPLASM OF LOWER-OUTER QUADRANT OF LEFT FEMALE BREAST: ICD-10-CM

## 2025-01-15 DIAGNOSIS — M85.88 OTHER SPECIFIED DISORDERS OF BONE DENSITY AND STRUCTURE, OTHER SITE: ICD-10-CM

## 2025-01-15 PROCEDURE — G2211 COMPLEX E/M VISIT ADD ON: CPT | Mod: NC

## 2025-01-15 PROCEDURE — 99213 OFFICE O/P EST LOW 20 MIN: CPT

## 2025-04-18 ENCOUNTER — NON-APPOINTMENT (OUTPATIENT)
Age: 65
End: 2025-04-18

## 2025-04-23 NOTE — ASU PATIENT PROFILE, ADULT - BLOOD AVOIDANCE/RESTRICTIONS, PROFILE
Pt here c/o cut to rt index finger with a knife approx 30 mins ago while opening a container.   none

## 2025-05-13 ENCOUNTER — APPOINTMENT (OUTPATIENT)
Dept: OBGYN | Facility: CLINIC | Age: 65
End: 2025-05-13

## 2025-05-13 VITALS
WEIGHT: 180 LBS | DIASTOLIC BLOOD PRESSURE: 77 MMHG | HEIGHT: 61.5 IN | SYSTOLIC BLOOD PRESSURE: 121 MMHG | BODY MASS INDEX: 33.55 KG/M2

## 2025-05-13 DIAGNOSIS — Z01.419 ENCOUNTER FOR GYNECOLOGICAL EXAMINATION (GENERAL) (ROUTINE) W/OUT ABNORMAL FINDINGS: ICD-10-CM

## 2025-05-13 PROCEDURE — 99459 PELVIC EXAMINATION: CPT | Mod: NC

## 2025-05-13 PROCEDURE — 99396 PREV VISIT EST AGE 40-64: CPT

## 2025-05-14 LAB — HPV HIGH+LOW RISK DNA PNL CVX: NOT DETECTED

## 2025-05-17 LAB — CYTOLOGY CVX/VAG DOC THIN PREP: ABNORMAL

## 2025-06-10 ENCOUNTER — NON-APPOINTMENT (OUTPATIENT)
Age: 65
End: 2025-06-10

## 2025-06-24 ENCOUNTER — APPOINTMENT (OUTPATIENT)
Dept: PLASTIC SURGERY | Facility: CLINIC | Age: 65
End: 2025-06-24
Payer: COMMERCIAL

## 2025-06-24 VITALS
BODY MASS INDEX: 33.55 KG/M2 | TEMPERATURE: 97.6 F | DIASTOLIC BLOOD PRESSURE: 76 MMHG | SYSTOLIC BLOOD PRESSURE: 114 MMHG | WEIGHT: 180 LBS | OXYGEN SATURATION: 96 % | HEART RATE: 95 BPM | HEIGHT: 61.5 IN

## 2025-06-24 PROCEDURE — 11922 CORRECT SKIN COLOR EA 20.0CM: CPT

## 2025-06-24 PROCEDURE — 11921 CORRECT SKN COLOR 6.1-20.0CM: CPT

## 2025-07-13 ENCOUNTER — OUTPATIENT (OUTPATIENT)
Dept: OUTPATIENT SERVICES | Facility: HOSPITAL | Age: 65
LOS: 1 days | Discharge: HOME | End: 2025-07-13

## 2025-07-13 DIAGNOSIS — Z90.13 ACQUIRED ABSENCE OF BILATERAL BREASTS AND NIPPLES: Chronic | ICD-10-CM

## 2025-07-13 DIAGNOSIS — Z98.890 OTHER SPECIFIED POSTPROCEDURAL STATES: Chronic | ICD-10-CM

## 2025-07-13 DIAGNOSIS — Z90.49 ACQUIRED ABSENCE OF OTHER SPECIFIED PARTS OF DIGESTIVE TRACT: Chronic | ICD-10-CM

## 2025-07-13 DIAGNOSIS — C50.919 MALIGNANT NEOPLASM OF UNSPECIFIED SITE OF UNSPECIFIED FEMALE BREAST: ICD-10-CM

## 2025-07-13 DIAGNOSIS — M12.9 ARTHROPATHY, UNSPECIFIED: Chronic | ICD-10-CM

## 2025-07-13 DIAGNOSIS — Z41.9 ENCOUNTER FOR PROCEDURE FOR PURPOSES OTHER THAN REMEDYING HEALTH STATE, UNSPECIFIED: Chronic | ICD-10-CM

## 2025-07-14 ENCOUNTER — APPOINTMENT (OUTPATIENT)
Dept: PLASTIC SURGERY | Facility: CLINIC | Age: 65
End: 2025-07-14
Payer: COMMERCIAL

## 2025-07-14 VITALS
BODY MASS INDEX: 34.36 KG/M2 | WEIGHT: 182 LBS | HEART RATE: 76 BPM | SYSTOLIC BLOOD PRESSURE: 128 MMHG | DIASTOLIC BLOOD PRESSURE: 84 MMHG | RESPIRATION RATE: 17 BRPM | TEMPERATURE: 98.1 F | HEIGHT: 61 IN | OXYGEN SATURATION: 99 %

## 2025-07-14 PROCEDURE — 99212 OFFICE O/P EST SF 10 MIN: CPT

## 2025-07-16 ENCOUNTER — RESULT REVIEW (OUTPATIENT)
Age: 65
End: 2025-07-16

## 2025-07-16 ENCOUNTER — APPOINTMENT (OUTPATIENT)
Dept: HEMATOLOGY ONCOLOGY | Facility: CLINIC | Age: 65
End: 2025-07-16
Payer: COMMERCIAL

## 2025-07-16 VITALS
RESPIRATION RATE: 17 BRPM | DIASTOLIC BLOOD PRESSURE: 76 MMHG | SYSTOLIC BLOOD PRESSURE: 116 MMHG | HEART RATE: 72 BPM | OXYGEN SATURATION: 98 % | WEIGHT: 185.73 LBS | BODY MASS INDEX: 35.09 KG/M2 | TEMPERATURE: 98 F

## 2025-07-16 LAB
BASOPHILS # BLD AUTO: 0.03 K/UL — SIGNIFICANT CHANGE UP (ref 0–0.2)
BASOPHILS NFR BLD AUTO: 0.5 % — SIGNIFICANT CHANGE UP (ref 0–2)
EOSINOPHIL # BLD AUTO: 0.07 K/UL — SIGNIFICANT CHANGE UP (ref 0–0.5)
EOSINOPHIL NFR BLD AUTO: 1.2 % — SIGNIFICANT CHANGE UP (ref 0–6)
HCT VFR BLD CALC: 39.6 % — SIGNIFICANT CHANGE UP (ref 34.5–45)
HGB BLD-MCNC: 12.7 G/DL — SIGNIFICANT CHANGE UP (ref 11.5–15.5)
IMM GRANULOCYTES NFR BLD AUTO: 0.3 % — SIGNIFICANT CHANGE UP (ref 0–0.9)
LYMPHOCYTES # BLD AUTO: 1.81 K/UL — SIGNIFICANT CHANGE UP (ref 1–3.3)
LYMPHOCYTES # BLD AUTO: 30.4 % — SIGNIFICANT CHANGE UP (ref 13–44)
MCHC RBC-ENTMCNC: 28.7 PG — SIGNIFICANT CHANGE UP (ref 27–34)
MCHC RBC-ENTMCNC: 32.1 G/DL — SIGNIFICANT CHANGE UP (ref 32–36)
MCV RBC AUTO: 89.6 FL — SIGNIFICANT CHANGE UP (ref 80–100)
MONOCYTES # BLD AUTO: 0.48 K/UL — SIGNIFICANT CHANGE UP (ref 0–0.9)
MONOCYTES NFR BLD AUTO: 8.1 % — SIGNIFICANT CHANGE UP (ref 2–14)
NEUTROPHILS # BLD AUTO: 3.54 K/UL — SIGNIFICANT CHANGE UP (ref 1.8–7.4)
NEUTROPHILS NFR BLD AUTO: 59.5 % — SIGNIFICANT CHANGE UP (ref 43–77)
NRBC BLD AUTO-RTO: 0 /100 WBCS — SIGNIFICANT CHANGE UP (ref 0–0)
PLATELET # BLD AUTO: 286 K/UL — SIGNIFICANT CHANGE UP (ref 150–400)
RBC # BLD: 4.42 M/UL — SIGNIFICANT CHANGE UP (ref 3.8–5.2)
RBC # FLD: 14.8 % — HIGH (ref 10.3–14.5)
WBC # BLD: 5.95 K/UL — SIGNIFICANT CHANGE UP (ref 3.8–10.5)
WBC # FLD AUTO: 5.95 K/UL — SIGNIFICANT CHANGE UP (ref 3.8–10.5)

## 2025-07-16 PROCEDURE — 99214 OFFICE O/P EST MOD 30 MIN: CPT

## 2025-07-16 PROCEDURE — G2211 COMPLEX E/M VISIT ADD ON: CPT | Mod: NC

## 2025-07-17 LAB
25(OH)D3 SERPL-MCNC: 42.3 NG/ML
ALBUMIN SERPL ELPH-MCNC: 3.9 G/DL
ALP BLD-CCNC: 179 U/L
ALT SERPL-CCNC: 35 U/L
ANION GAP SERPL CALC-SCNC: 15 MMOL/L
AST SERPL-CCNC: 25 U/L
BILIRUB SERPL-MCNC: 0.4 MG/DL
BUN SERPL-MCNC: 14 MG/DL
CALCIUM SERPL-MCNC: 9.3 MG/DL
CHLORIDE SERPL-SCNC: 109 MMOL/L
CHOLEST SERPL-MCNC: 185 MG/DL
CO2 SERPL-SCNC: 21 MMOL/L
CREAT SERPL-MCNC: 1.04 MG/DL
EGFRCR SERPLBLD CKD-EPI 2021: 60 ML/MIN/1.73M2
ESTIMATED AVERAGE GLUCOSE: 97 MG/DL
GLUCOSE SERPL-MCNC: 89 MG/DL
HBA1C MFR BLD HPLC: 5 %
HDLC SERPL-MCNC: 63 MG/DL
LDLC SERPL-MCNC: 103 MG/DL
NONHDLC SERPL-MCNC: 122 MG/DL
POTASSIUM SERPL-SCNC: 4.5 MMOL/L
PROT SERPL-MCNC: 6.2 G/DL
SODIUM SERPL-SCNC: 145 MMOL/L
TRIGL SERPL-MCNC: 107 MG/DL
TSH SERPL-ACNC: 1.53 UIU/ML

## 2025-07-21 NOTE — H&P PST ADULT - BMI (KG/M2)
Copied from CRM #[id number]. Topic: MW Messaging - MW Patient Request  >> Jul 21, 2025  3:25 PM Veronica MAY wrote:  --DO NOT REPLY - Sent from PACT - If sent to wrong pool, reroute to P ECO Reroute pool --    Reason for Appointment Message: Pre-Op appointment - Unable to schedule within 30- days of surgery date   PreOp Appointment Scheduled?  Yes  Type of surgery: dental procedure   Location of surgery: [facility]  Date of surgery: 07/25/2025  Surgeon Name: N/A  Other information: looking for an appointment ASAP  Callback #: [phone]  Can a detailed message be left? Yes - Voicemail   Caller has been advised this message will be addressed within:1 business day [high priority]  
I can see her tomorrow at 8:40.   
Writer called mom and informed her that Dr. Nolasco will see her tomorrow morning. Mom advised to bring pre op orders/medical clearance form to appt.    Writer will also call [facility] Dentist office. Writer called [phone] to please have them fax over orders. Left message with office fax number.           
Writer returned call to mom (guardianship paperwork reviewed). Mom reports that pt has 2 cavities and receeding gum line. Mom reports that pt will need a pre op appt because pt will be going under general anesthesia. Mom was told in January they are scheduling 1 year out for procedure. Mom reports she just received a call that there was a last minute cancellation for this Friday 7/25.    Mom informed that Dr. Otoole is out of the office this week and as of right now the covering provider doesn't have availability. Mom informed that a message will be sent to Dr. Nolasco to advise if there are earlier appts. Mom advised to contact dentist to inform them and see if there is a different date available.  Mom verbalized understanding.         
44.4